# Patient Record
Sex: MALE | URBAN - METROPOLITAN AREA
[De-identification: names, ages, dates, MRNs, and addresses within clinical notes are randomized per-mention and may not be internally consistent; named-entity substitution may affect disease eponyms.]

---

## 2018-01-09 ENCOUNTER — APPOINTMENT (RX ONLY)
Dept: URBAN - METROPOLITAN AREA CLINIC 4 | Facility: CLINIC | Age: 68
Setting detail: DERMATOLOGY
End: 2018-01-09

## 2018-01-09 DIAGNOSIS — I87.2 VENOUS INSUFFICIENCY (CHRONIC) (PERIPHERAL): ICD-10-CM

## 2018-01-09 DIAGNOSIS — L81.4 OTHER MELANIN HYPERPIGMENTATION: ICD-10-CM

## 2018-01-09 DIAGNOSIS — L82.1 OTHER SEBORRHEIC KERATOSIS: ICD-10-CM

## 2018-01-09 DIAGNOSIS — L85.3 XEROSIS CUTIS: ICD-10-CM

## 2018-01-09 DIAGNOSIS — D22 MELANOCYTIC NEVI: ICD-10-CM

## 2018-01-09 PROBLEM — D22.5 MELANOCYTIC NEVI OF TRUNK: Status: ACTIVE | Noted: 2018-01-09

## 2018-01-09 PROBLEM — I83.10 VARICOSE VEINS OF UNSPECIFIED LOWER EXTREMITY WITH INFLAMMATION: Status: ACTIVE | Noted: 2018-01-09

## 2018-01-09 PROCEDURE — ? COUNSELING

## 2018-01-09 PROCEDURE — 99202 OFFICE O/P NEW SF 15 MIN: CPT

## 2018-01-09 ASSESSMENT — LOCATION ZONE DERM
LOCATION ZONE: TRUNK
LOCATION ZONE: FACE

## 2018-01-09 ASSESSMENT — LOCATION SIMPLE DESCRIPTION DERM
LOCATION SIMPLE: UPPER BACK
LOCATION SIMPLE: RIGHT UPPER BACK
LOCATION SIMPLE: LEFT CHEEK
LOCATION SIMPLE: LEFT UPPER BACK

## 2018-01-09 ASSESSMENT — LOCATION DETAILED DESCRIPTION DERM
LOCATION DETAILED: LEFT INFERIOR CENTRAL MALAR CHEEK
LOCATION DETAILED: LEFT INFERIOR MEDIAL UPPER BACK
LOCATION DETAILED: LEFT CENTRAL MALAR CHEEK
LOCATION DETAILED: INFERIOR THORACIC SPINE
LOCATION DETAILED: RIGHT INFERIOR MEDIAL UPPER BACK

## 2018-02-07 ENCOUNTER — OFFICE VISIT (OUTPATIENT)
Dept: MEDICAL GROUP | Facility: MEDICAL CENTER | Age: 68
End: 2018-02-07
Payer: COMMERCIAL

## 2018-02-07 VITALS
TEMPERATURE: 97.4 F | WEIGHT: 202 LBS | OXYGEN SATURATION: 95 % | HEART RATE: 68 BPM | DIASTOLIC BLOOD PRESSURE: 78 MMHG | RESPIRATION RATE: 16 BRPM | SYSTOLIC BLOOD PRESSURE: 110 MMHG | HEIGHT: 70 IN | BODY MASS INDEX: 28.92 KG/M2

## 2018-02-07 DIAGNOSIS — Z13.1 SCREENING FOR DIABETES MELLITUS: ICD-10-CM

## 2018-02-07 DIAGNOSIS — Z00.00 HEALTHCARE MAINTENANCE: ICD-10-CM

## 2018-02-07 DIAGNOSIS — E66.3 OVERWEIGHT (BMI 25.0-29.9): ICD-10-CM

## 2018-02-07 DIAGNOSIS — K22.719 BARRETT'S ESOPHAGUS WITH DYSPLASIA: ICD-10-CM

## 2018-02-07 DIAGNOSIS — Z23 NEED FOR VACCINATION: ICD-10-CM

## 2018-02-07 DIAGNOSIS — E78.5 DYSLIPIDEMIA: ICD-10-CM

## 2018-02-07 DIAGNOSIS — R10.13 DYSPEPSIA: ICD-10-CM

## 2018-02-07 DIAGNOSIS — K64.8 OTHER HEMORRHOIDS: ICD-10-CM

## 2018-02-07 DIAGNOSIS — Z13.6 SCREENING FOR ISCHEMIC HEART DISEASE: ICD-10-CM

## 2018-02-07 DIAGNOSIS — R68.82 DECREASED LIBIDO: ICD-10-CM

## 2018-02-07 DIAGNOSIS — R35.1 NOCTURIA: ICD-10-CM

## 2018-02-07 PROBLEM — K22.70 BARRETT ESOPHAGUS: Status: ACTIVE | Noted: 2018-02-07

## 2018-02-07 PROCEDURE — 90471 IMMUNIZATION ADMIN: CPT | Performed by: FAMILY MEDICINE

## 2018-02-07 PROCEDURE — 90670 PCV13 VACCINE IM: CPT | Performed by: FAMILY MEDICINE

## 2018-02-07 PROCEDURE — 99397 PER PM REEVAL EST PAT 65+ YR: CPT | Mod: 25 | Performed by: FAMILY MEDICINE

## 2018-02-07 RX ORDER — OMEPRAZOLE 20 MG/1
20 CAPSULE, DELAYED RELEASE ORAL DAILY
Qty: 90 CAP | Refills: 3 | Status: SHIPPED | OUTPATIENT
Start: 2018-02-07 | End: 2019-03-19 | Stop reason: SDUPTHER

## 2018-02-07 RX ORDER — UBIDECARENONE 200 MG
CAPSULE ORAL
COMMUNITY
End: 2019-04-29

## 2018-02-07 ASSESSMENT — PATIENT HEALTH QUESTIONNAIRE - PHQ9: CLINICAL INTERPRETATION OF PHQ2 SCORE: 0

## 2018-02-07 NOTE — ASSESSMENT & PLAN NOTE
In 2015 the patient had an EGD showing short segment Downey's esophagus. He is not currently on a proton pump inhibitor.  
Lipids: Ordered.  Fasting Glucose: Ordered.  Hepatitis C Screen: Ordered.  Colonoscopy: Records requested from the [facility].    Flu vaccine: given 2017 per patient.   Pneumonia vaccine: Prevnar given 02/07/18.   Tdap: done 5 years ago per patient.   Zoster vaccine: given at 63 y/o per patient.    
Patient has dyspepsia. He did use a proton pump inhibitor in the past with good effect.  
Patient states that he has to get up about 3 times per night to urinate. He denies dysuria.  
The patient describes decreased libido with softer erections than usual. He is interested in testosterone testing.  
The patient has hemorrhoids. He would like these examined today.  
The patient is only taking red yeast rice and omega-3 fatty acids for his cholesterol. He used to take a statin but stopped. He tolerated the statin just fine.  
The patient is overweight with dyslipidemia. He is interested in medical weight loss options.  
Attending Only

## 2018-02-07 NOTE — PROGRESS NOTES
[facility]  Progress Note  Established Patient    Subjective:   Heber Jacobs is a 68 y.o. male here today for a wellness exam.     Decreased libido  The patient describes decreased libido with softer erections than usual. He is interested in testosterone testing.    Healthcare maintenance  Lipids: Ordered.  Fasting Glucose: Ordered.  Hepatitis C Screen: Ordered.  Colonoscopy: Records requested from the [facility].    Flu vaccine: given 2017 per patient.   Pneumonia vaccine: Prevnar given 02/07/18.   Tdap: done 5 years ago per patient.   Zoster vaccine: given at 61 y/o per patient.      Overweight (BMI 25.0-29.9)  The patient is overweight with dyslipidemia. He is interested in medical weight loss options.    Other hemorrhoids  The patient has hemorrhoids. He would like these examined today.    Nocturia  Patient states that he has to get up about 3 times per night to urinate. He denies dysuria.    Dyspepsia  Patient has dyspepsia. He did use a proton pump inhibitor in the past with good effect.    Dyslipidemia  The patient is only taking red yeast rice and omega-3 fatty acids for his cholesterol. He used to take a statin but stopped. He tolerated the statin just fine.    Downey esophagus  In 2015 the patient had an EGD showing short segment Downey's esophagus. He is not currently on a proton pump inhibitor.      Current Outpatient Prescriptions on File Prior to Visit   Medication Sig Dispense Refill   • Ascorbic Acid Buffered (BUFFERED VITAMIN C) 1000 MG Cap Take 3,000 mg by mouth 2 Times a Day. 180 Cap 6   • therapeutic multivitamin-minerals (THERAGRAN-M) Tab Take 1 Tab by mouth every day. 30 Tab 11   • Omega-3 Fatty Acids (FISH OIL) 1200 MG Cap Take  by mouth.     • hyoscyamine (ANASPAZ, LEVSIN) 0.125 MG tablet Take 1 Tab by mouth every four hours as needed. 20 Tab 1     No current facility-administered medications on file prior to visit.        Past Medical History:   Diagnosis Date   •  "Downey esophagus    • GERD (gastroesophageal reflux disease)    • Hemorrhoid    • Hyperlipidemia    • Nocturia        Allergies: Patient has no known allergies.    Surgical History:  has a past surgical history that includes knee arthroscopy (9/ 2013) and exploratory laparotomy.    Family History: family history includes Cancer in his brother and mother; Heart Disease in his father.    Social History:  reports that he has never smoked. He has never used smokeless tobacco. He reports that he drinks alcohol. He reports that he does not use drugs.    ROS: see HPI.        Objective:     Vitals:    02/07/18 0936   BP: 110/78   Pulse: 68   Resp: 16   Temp: 36.3 °C (97.4 °F)   SpO2: 95%   Weight: 91.6 kg (202 lb)   Height: 1.778 m (5' 10\")       Physical Exam:  General: alert in no apparent distress.   Cardio: regular rate and rhythm, no murmurs, rubs or gallops.   Resp: CTAB no w/r/r.   : residual hemorrhoid identified. Homogenously enlarged prostate without nodularity.         Assessment and Plan:     1. Healthcare maintenance  - see HPI.   - discussed diet and exercise.     2. Decreased libido  - TESTOSTERONE SERUM; Future    3. Screening for ischemic heart disease  - Lipid panel off Red Yeast Rice.    4. Screening for diabetes mellitus  - COMP METABOLIC PANEL; Future    5. Other hemorrhoids  - supportive care.     6. Dyspepsia  - PPI.     7. Overweight (BMI 25.0-29.9)  - REFERRAL TO [facility] [employer] IMPROVEMENT PROGRAMS (HIP) Services Requested: Physician Medical Weight Management Program; Reason for Referral? BMI>25 and 1 or more co-morbidities, including DM2, HTN, steatosis/steatohepatitis/fatty liver, obstructive ...    8. Nocturia  With enlarged prostate on exam.   - URINALYSIS,CULTURE IF INDICATED; Future  - PROSTATE SPECIFIC AG SCREENING; Future  - consider Flomax in f/u.     9. Need for vaccination  - PNEUMOCOCCAL CONJUGATE VACCINE 13-VALENT    10. Dyslipidemia  - LIPID PROFILE; Future    11. Downey's esophagus " with dysplasia  - omeprazole (PRILOSEC) 20 MG delayed-release capsule; Take 1 Cap by mouth every day.  Dispense: 90 Cap; Refill: 3  - REFERRAL TO GASTROENTEROLOGY        Followup: Return in about 4 weeks (around 3/7/2018), or if symptoms worsen or fail to improve.

## 2018-02-21 ENCOUNTER — HOSPITAL ENCOUNTER (OUTPATIENT)
Dept: LAB | Facility: MEDICAL CENTER | Age: 68
End: 2018-02-21
Attending: FAMILY MEDICINE
Payer: COMMERCIAL

## 2018-02-21 DIAGNOSIS — R35.1 NOCTURIA: ICD-10-CM

## 2018-02-21 DIAGNOSIS — R68.82 DECREASED LIBIDO: ICD-10-CM

## 2018-02-21 DIAGNOSIS — Z13.1 SCREENING FOR DIABETES MELLITUS: ICD-10-CM

## 2018-02-21 DIAGNOSIS — E78.5 DYSLIPIDEMIA: ICD-10-CM

## 2018-02-21 LAB
ALBUMIN SERPL BCP-MCNC: 4.6 G/DL (ref 3.2–4.9)
ALBUMIN/GLOB SERPL: 1.8 G/DL
ALP SERPL-CCNC: 73 U/L (ref 30–99)
ALT SERPL-CCNC: 17 U/L (ref 2–50)
ANION GAP SERPL CALC-SCNC: 7 MMOL/L (ref 0–11.9)
APPEARANCE UR: CLEAR
AST SERPL-CCNC: 19 U/L (ref 12–45)
BILIRUB SERPL-MCNC: 0.6 MG/DL (ref 0.1–1.5)
BILIRUB UR QL STRIP.AUTO: NEGATIVE
BUN SERPL-MCNC: 15 MG/DL (ref 8–22)
CALCIUM SERPL-MCNC: 9.4 MG/DL (ref 8.5–10.5)
CHLORIDE SERPL-SCNC: 106 MMOL/L (ref 96–112)
CHOLEST SERPL-MCNC: 241 MG/DL (ref 100–199)
CO2 SERPL-SCNC: 29 MMOL/L (ref 20–33)
COLOR UR: YELLOW
CREAT SERPL-MCNC: 0.78 MG/DL (ref 0.5–1.4)
CULTURE IF INDICATED INDCX: NO UA CULTURE
GLOBULIN SER CALC-MCNC: 2.5 G/DL (ref 1.9–3.5)
GLUCOSE SERPL-MCNC: 88 MG/DL (ref 65–99)
GLUCOSE UR STRIP.AUTO-MCNC: NEGATIVE MG/DL
HDLC SERPL-MCNC: 47 MG/DL
KETONES UR STRIP.AUTO-MCNC: NEGATIVE MG/DL
LDLC SERPL CALC-MCNC: 169 MG/DL
LEUKOCYTE ESTERASE UR QL STRIP.AUTO: NEGATIVE
MICRO URNS: NORMAL
NITRITE UR QL STRIP.AUTO: NEGATIVE
PH UR STRIP.AUTO: 6.5 [PH]
POTASSIUM SERPL-SCNC: 3.8 MMOL/L (ref 3.6–5.5)
PROT SERPL-MCNC: 7.1 G/DL (ref 6–8.2)
PROT UR QL STRIP: NEGATIVE MG/DL
PSA SERPL-MCNC: 2.53 NG/ML (ref 0–4)
RBC UR QL AUTO: NEGATIVE
SODIUM SERPL-SCNC: 142 MMOL/L (ref 135–145)
SP GR UR STRIP.AUTO: 1.02
TESTOST SERPL-MCNC: 472 NG/DL (ref 175–781)
TRIGL SERPL-MCNC: 124 MG/DL (ref 0–149)
UROBILINOGEN UR STRIP.AUTO-MCNC: 0.2 MG/DL

## 2018-02-21 PROCEDURE — 80061 LIPID PANEL: CPT

## 2018-02-21 PROCEDURE — 36415 COLL VENOUS BLD VENIPUNCTURE: CPT

## 2018-02-21 PROCEDURE — 80053 COMPREHEN METABOLIC PANEL: CPT

## 2018-02-21 PROCEDURE — 81003 URINALYSIS AUTO W/O SCOPE: CPT

## 2018-02-21 PROCEDURE — 84153 ASSAY OF PSA TOTAL: CPT

## 2018-02-21 PROCEDURE — 84403 ASSAY OF TOTAL TESTOSTERONE: CPT

## 2018-03-07 ENCOUNTER — OFFICE VISIT (OUTPATIENT)
Dept: MEDICAL GROUP | Facility: MEDICAL CENTER | Age: 68
End: 2018-03-07
Payer: COMMERCIAL

## 2018-03-07 VITALS
RESPIRATION RATE: 16 BRPM | WEIGHT: 204 LBS | SYSTOLIC BLOOD PRESSURE: 118 MMHG | HEIGHT: 70 IN | TEMPERATURE: 97.1 F | DIASTOLIC BLOOD PRESSURE: 64 MMHG | OXYGEN SATURATION: 94 % | BODY MASS INDEX: 29.2 KG/M2 | HEART RATE: 72 BPM

## 2018-03-07 DIAGNOSIS — R35.1 BENIGN PROSTATIC HYPERPLASIA WITH NOCTURIA: ICD-10-CM

## 2018-03-07 DIAGNOSIS — Z00.00 HEALTHCARE MAINTENANCE: ICD-10-CM

## 2018-03-07 DIAGNOSIS — N40.1 BENIGN PROSTATIC HYPERPLASIA WITH NOCTURIA: ICD-10-CM

## 2018-03-07 DIAGNOSIS — N52.9 ERECTILE DYSFUNCTION, UNSPECIFIED ERECTILE DYSFUNCTION TYPE: ICD-10-CM

## 2018-03-07 DIAGNOSIS — E78.5 DYSLIPIDEMIA: ICD-10-CM

## 2018-03-07 DIAGNOSIS — M25.552 PAIN OF LEFT HIP JOINT: ICD-10-CM

## 2018-03-07 PROCEDURE — 99214 OFFICE O/P EST MOD 30 MIN: CPT | Performed by: FAMILY MEDICINE

## 2018-03-07 RX ORDER — FINASTERIDE 5 MG/1
5 TABLET, FILM COATED ORAL DAILY
Qty: 90 TAB | Refills: 3 | Status: SHIPPED | OUTPATIENT
Start: 2018-03-07 | End: 2018-12-31

## 2018-03-07 RX ORDER — SILDENAFIL 50 MG/1
50 TABLET, FILM COATED ORAL PRN
Qty: 10 TAB | Refills: 1 | Status: SHIPPED | OUTPATIENT
Start: 2018-03-07 | End: 2018-07-16 | Stop reason: SDUPTHER

## 2018-03-07 RX ORDER — ASPIRIN 81 MG/1
81 TABLET, CHEWABLE ORAL DAILY
Qty: 100 TAB | Refills: 0
Start: 2018-03-07 | End: 2021-07-15

## 2018-03-07 RX ORDER — SIMVASTATIN 20 MG
20 TABLET ORAL EVERY EVENING
Qty: 90 TAB | Refills: 3 | Status: SHIPPED | OUTPATIENT
Start: 2018-03-07 | End: 2019-03-19 | Stop reason: SDUPTHER

## 2018-03-07 NOTE — ASSESSMENT & PLAN NOTE
At the last visit the patient described decreased libido with softer erections. Testosterone normal. He is interested in Viagra, however, does not want to take it daily.

## 2018-03-07 NOTE — ASSESSMENT & PLAN NOTE
The patient describes a many year history of intermittent left hip pain rated 2-3 out of 10 when it does occur. It has not worsened over these many years. There are no known alleviating or exacerbating factors.

## 2018-03-07 NOTE — ASSESSMENT & PLAN NOTE
She has dyslipidemia. His ten year ACC risk is 14%. He has used simvastatin effectively in the past.

## 2018-03-07 NOTE — ASSESSMENT & PLAN NOTE
Lipids: Done in 2018, 10 year ACC risk 14%. Starting statin and aspirin.  Fasting Glucose: 2018 was normal.  Hepatitis C Screen: 2018 was normal.  Colonoscopy: Records requested from the [facility].    Flu vaccine: given 2017 per patient.   Pneumonia vaccine: Prevnar given 02/07/18.   Tdap: done 5 years ago per patient.   Zoster vaccine: given at 61 y/o per patient.

## 2018-03-07 NOTE — ASSESSMENT & PLAN NOTE
The patient has an enlarged prostate with nocturia. UA and PSA normal. He cannot tolerate alpha blockers.

## 2018-03-07 NOTE — PROGRESS NOTES
[facility]  Progress Note  Established Patient    Subjective:   Heber Jacobs is a 68 y.o. male here today with a chief complaint of hip pain. The patient is alone.     Pain of left hip joint  The patient describes a many year history of intermittent left hip pain rated 2-3 out of 10 when it does occur. It has not worsened over these many years. There are no known alleviating or exacerbating factors.    Benign prostatic hyperplasia with nocturia  The patient has an enlarged prostate with nocturia. UA and PSA normal. He cannot tolerate alpha blockers.     Dyslipidemia  She has dyslipidemia. His ten year ACC risk is 14%. He has used simvastatin effectively in the past.     Erectile dysfunction  At the last visit the patient described decreased libido with softer erections. Testosterone normal. He is interested in Viagra, however, does not want to take it daily.    Healthcare maintenance  Lipids: Done in 2018, 10 year ACC risk 14%. Starting statin and aspirin.  Fasting Glucose: 2018 was normal.  Hepatitis C Screen: 2018 was normal.  Colonoscopy: Records requested from the [facility].    Flu vaccine: given 2017 per patient.   Pneumonia vaccine: Prevnar given 02/07/18.   Tdap: done 5 years ago per patient.   Zoster vaccine: given at 63 y/o per patient.      Current Outpatient Prescriptions on File Prior to Visit   Medication Sig Dispense Refill   • Coenzyme Q10 (COQ-10) 200 MG Cap Take  by mouth.     • omeprazole (PRILOSEC) 20 MG delayed-release capsule Take 1 Cap by mouth every day. 90 Cap 3   • hyoscyamine (ANASPAZ, LEVSIN) 0.125 MG tablet Take 1 Tab by mouth every four hours as needed. 20 Tab 1   • Ascorbic Acid Buffered (BUFFERED VITAMIN C) 1000 MG Cap Take 3,000 mg by mouth 2 Times a Day. 180 Cap 6   • therapeutic multivitamin-minerals (THERAGRAN-M) Tab Take 1 Tab by mouth every day. 30 Tab 11   • Omega-3 Fatty Acids (FISH OIL) 1200 MG Cap Take  by mouth.       No current facility-administered  "medications on file prior to visit.        Past Medical History:   Diagnosis Date   • Downey esophagus    • GERD (gastroesophageal reflux disease)    • Hemorrhoid    • Hyperlipidemia    • Nocturia        Allergies: Patient has no known allergies.    Surgical History:  has a past surgical history that includes knee arthroscopy (9/ 2013) and exploratory laparotomy.    Family History: family history includes Cancer in his brother and mother; Heart Disease in his father.    Social History:  reports that he has never smoked. He has never used smokeless tobacco. He reports that he drinks alcohol. He reports that he does not use drugs.    ROS: no fever, no bowel or bladder incontinence or retention.        Objective:     Vitals:    03/07/18 0753   BP: 118/64   Pulse: 72   Resp: 16   Temp: 36.2 °C (97.1 °F)   SpO2: 94%   Weight: 92.5 kg (204 lb)   Height: 1.778 m (5' 10\")       Physical Exam:  General: alert in no apparent distress.   MSK: No tenderness to palpation over spine or hip. No trochanteric bursal tenderness.RODERICK negative bilaterally. Negative logroll bilaterally. Patient points to the left SI joint when asked where his pain is.        Assessment and Plan:     1. Erectile dysfunction, unspecified erectile dysfunction type  - sildenafil citrate (VIAGRA) 50 MG tablet; Take 1 Tab by mouth as needed for Erectile Dysfunction.  Dispense: 10 Tab; Refill: 1    2. Dyslipidemia  - simvastatin (ZOCOR) 20 MG Tab; Take 1 Tab by mouth every evening.  Dispense: 90 Tab; Refill: 3    3. Benign prostatic hyperplasia with nocturia  Cannot tolerate alpha blockers, doesn't want to take daily viagra.   - finasteride (PROSCAR) 5 MG Tab; Take 1 Tab by mouth every day.  Dispense: 90 Tab; Refill: 3    4. Healthcare maintenance  - see HPI.   - aspirin (ASA) 81 MG Chew Tab chewable tablet; Take 1 Tab by mouth every day.  Dispense: 100 Tab; Refill: 0  - simvastatin (ZOCOR) 20 MG Tab; Take 1 Tab by mouth every evening.  Dispense: 90 Tab; " Refill: 3    5. Pain of left hip joint  Exam normal. Suspect L SI joint pain.   - DX-LUMBAR SPINE-2 OR 3 VIEWS; Future  - DX-HIP-BILATERAL-WITH PELVIS-2 VIEWS; Future  - discussed exercises and stretches, booklet given.   - advised judicious use of NSAID's.  - return if no resolution by 6 weeks.         Followup: Return in about 9 months (around 12/7/2018), or if symptoms worsen or fail to improve.

## 2018-04-03 ENCOUNTER — NON-PROVIDER VISIT (OUTPATIENT)
Dept: HEALTH INFORMATION MANAGEMENT | Facility: MEDICAL CENTER | Age: 68
End: 2018-04-03
Payer: COMMERCIAL

## 2018-04-03 ENCOUNTER — HOSPITAL ENCOUNTER (OUTPATIENT)
Dept: RADIOLOGY | Facility: MEDICAL CENTER | Age: 68
End: 2018-04-03
Attending: FAMILY MEDICINE
Payer: COMMERCIAL

## 2018-04-03 VITALS
HEART RATE: 75 BPM | SYSTOLIC BLOOD PRESSURE: 122 MMHG | WEIGHT: 203.6 LBS | BODY MASS INDEX: 29.15 KG/M2 | DIASTOLIC BLOOD PRESSURE: 76 MMHG | OXYGEN SATURATION: 96 % | HEIGHT: 70 IN

## 2018-04-03 DIAGNOSIS — M25.552 PAIN OF LEFT HIP JOINT: ICD-10-CM

## 2018-04-03 DIAGNOSIS — E66.3 OVERWEIGHT (BMI 25.0-29.9): ICD-10-CM

## 2018-04-03 DIAGNOSIS — E78.5 DYSLIPIDEMIA: ICD-10-CM

## 2018-04-03 PROCEDURE — 73521 X-RAY EXAM HIPS BI 2 VIEWS: CPT

## 2018-04-03 PROCEDURE — 97802 MEDICAL NUTRITION INDIV IN: CPT | Performed by: DIETITIAN, REGISTERED

## 2018-04-03 PROCEDURE — 72100 X-RAY EXAM L-S SPINE 2/3 VWS: CPT

## 2018-04-03 PROCEDURE — 93000 ELECTROCARDIOGRAM COMPLETE: CPT | Performed by: INTERNAL MEDICINE

## 2018-04-03 PROCEDURE — 99204 OFFICE O/P NEW MOD 45 MIN: CPT | Mod: 25 | Performed by: INTERNAL MEDICINE

## 2018-04-03 NOTE — PATIENT INSTRUCTIONS
Keep total daily kcal <1600 to start, total daily carbs < 100 g per day and >100 g protein per day  64+ oz water per day  One glass milk per day  Track intake with My Fitness Pal  One Robard shake mid-morning daily while working  Consider instead of nuts at night using Robard snack bar for some nights  If having nuts, measure portion sizes first

## 2018-04-03 NOTE — PROGRESS NOTES
"4/3/2018   Referring Provider: Levi Akers M.D.       Time in/out:  11:20-    Anthropometrics/Objective  Weight: 203.6lb   Height: 5'10\"  BMI:  29.26  Stated Goal Weight: 175-180lb  See comprehensive patient history form for further information     Subjective:  Pt is here today for the initial screening visit for the medical weight management program.   He is a  so often eats a light breakfast, then goes out to breakfast during his break for a large meal. Then skips lunch and doesn't eat until dinner around 5pm.   He finds that he grazes at night while on the computer - 1/2 cup nuts for examples   Has done [employer] in past but was too difficult to maintain   See Medical Questionnaire for more detailed diet history     Nutrition Diagnosis (PES Statement)  · Obesity related to excessive energy intake and inadequate energy expenditure as evidenced by BMI >30     Client history:  Condition(s) associated with a diagnosis or treatment (specify) : Dyslipidemia, Downey esophagus     Biochemical data, medical test and procedures  No results found for: HBA1C@  No results found for: POCGLUCOSE  Lab Results   Component Value Date/Time    CHOLSTRLTOT 241 (H) 02/21/2018 08:14 AM     (H) 02/21/2018 08:14 AM    HDL 47 02/21/2018 08:14 AM    TRIGLYCERIDE 124 02/21/2018 08:14 AM         Nutrition Intervention  Nutrition Prescription  Recommended Daily Kcals: ~1650 Kcal based on REE of 1671kcal    Recommended Daily Protein: 80-120g (20-30% kcal)     Meal Plan Recommendation   Low Calorie Diet with 1 snack replacement bar per day     Comprehensive Nutrition Education Instruction or training leading to in-depth nutrition related knowledge about:  Benefits to following meal plan, Combine carb, protein and fat at each meal, Meal timing and spacing, Portion control, Sweets and alcohol in moderation   Handouts provided regarding topics discussed     Monitoring & Evaluation " Plan    Behavioral-Environmental:  Behavior: Keep a food journal and bring to next appointment _ My Fitness pal aiming for ~1600kcal per day    Physical activity:  Exercise 2 days a week and increase as able     Food / Nutrient Intake:  Food intake: Follow meal plan as discussed (see above). Avoid concentrated sweets and processed carbs.  Use plate method for macronutrient balance; limit starch portion to 1/2 cup per meal or less. Briefly discussed a goal of <100g carbs a day but nothing concrete.     Do not skip lunch. Have a Robard snack bar for 10am snack instead of going out to breakfast.  Avoid grazing at night. Instead have a high protein (1oz) snack with veggies or 1 fruit. As long as you are eating a meal within 1 hr of working out , there is not need for a protein drink or chocolate milk.     Fluid intake: Consume at least 64 oz water per day. Avoid all unsweetened beverages (chocolate milk). Limit coffee to 1 cup a day (ideally no cream or sugar). Avoid alcohol.     Physical Signs / Symptoms:  Weight change : -1-2lb per week or 5% in 3 months ; Pt would like to lose 20lb and does not need to lose it rapidly.       Assessment Notes:  Pt will start by tracking in My fitness pal. He will work on a better schedule for meal timing. His biggest area for improvement is to avoid going out for a large breakfast and then avoiding excess calories from grazing at night, mindless eating. Keeping the food journal should help with mindful eating. Recommended pt also work on macronutrient balance, aiming for less processed carbs and higher protein meals. Did not go over specific number or carbs to avoid list. Wanted first for pt to see his current portions and discuss adjustments at next appt as needed.     Follow up 2 weeks

## 2018-04-04 DIAGNOSIS — T78.40XA ALLERGIC DISORDER, INITIAL ENCOUNTER: ICD-10-CM

## 2018-04-17 ENCOUNTER — OFFICE VISIT (OUTPATIENT)
Dept: MEDICAL GROUP | Facility: MEDICAL CENTER | Age: 68
End: 2018-04-17
Payer: COMMERCIAL

## 2018-04-17 VITALS
SYSTOLIC BLOOD PRESSURE: 128 MMHG | OXYGEN SATURATION: 94 % | TEMPERATURE: 97.4 F | WEIGHT: 198.2 LBS | HEART RATE: 68 BPM | DIASTOLIC BLOOD PRESSURE: 70 MMHG | BODY MASS INDEX: 28.37 KG/M2 | HEIGHT: 70 IN

## 2018-04-17 DIAGNOSIS — K64.5 THROMBOSED HEMORRHOIDS: ICD-10-CM

## 2018-04-17 PROCEDURE — 99214 OFFICE O/P EST MOD 30 MIN: CPT | Performed by: PHYSICIAN ASSISTANT

## 2018-04-17 RX ORDER — HYDROCORTISONE ACETATE 25 MG/1
25 SUPPOSITORY RECTAL EVERY 12 HOURS
Qty: 20 SUPPOSITORY | Refills: 0 | Status: SHIPPED | OUTPATIENT
Start: 2018-04-17 | End: 2019-04-29

## 2018-04-17 NOTE — PROGRESS NOTES
"Chief Complaint   Patient presents with   • Hemorrhoids     outside of rectum,very pain x 3 days       HPI  Heber Jacobs is a 68 y.o. male here today for new onset rectal pain X 3-4 days. Pt has history of internal and external hemorrhoids. He noticed new onset pain and swelling 3 days ago and pain has been increasing every day. Pain in is constant 3/10. No constipation or D,N,V, no blood in stool, no rectal bleeding.     Past medical, surgical, family, and social history is reviewed in Epic chart by me today.   Medications and allergies reviewed and updated in Epic chart by me today.     ROS:   As documented in history of present illness above    Exam:  Blood pressure 128/70, pulse 68, temperature 36.3 °C (97.4 °F), height 1.778 m (5' 10\"), weight 89.9 kg (198 lb 3.2 oz), SpO2 94 %.  Constitutional: Alert, no distress, plus 3 vital signs  Skin:  Warm, dry, no rashes invisible areas  Eye: Equal, round and reactive, conjunctiva clear  Abdomen: Pos thrombosed external hemorrhoid at 7:00 position. Mild tenderness to touch   Psych: Alert, pleasant, well-groomed, normal affect    A/P:  1. Thrombosed hemorrhoids  Advised increased fiber, fluid intake, stool softener, locking, sitz bath, topical hydrocortisone cream  - REFERRAL TO GENERAL SURGERY if symptoms doesn't improve in 10 days  - hydrocortisone (ANUSOL-HC) 25 MG Suppos; Insert 1 Suppository in rectum every 12 hours.  Dispense: 20 Suppository; Refill: 0    F/u prn  "

## 2018-04-18 ENCOUNTER — OFFICE VISIT (OUTPATIENT)
Dept: HEALTH INFORMATION MANAGEMENT | Facility: MEDICAL CENTER | Age: 68
End: 2018-04-18
Payer: COMMERCIAL

## 2018-04-18 ENCOUNTER — TELEPHONE (OUTPATIENT)
Dept: MEDICAL GROUP | Facility: MEDICAL CENTER | Age: 68
End: 2018-04-18

## 2018-04-18 VITALS
BODY MASS INDEX: 28.1 KG/M2 | WEIGHT: 196.3 LBS | SYSTOLIC BLOOD PRESSURE: 120 MMHG | HEIGHT: 70 IN | HEART RATE: 67 BPM | OXYGEN SATURATION: 98 % | DIASTOLIC BLOOD PRESSURE: 68 MMHG

## 2018-04-18 VITALS — BODY MASS INDEX: 28.1 KG/M2 | WEIGHT: 196.3 LBS | HEIGHT: 70 IN

## 2018-04-18 DIAGNOSIS — E78.5 DYSLIPIDEMIA: ICD-10-CM

## 2018-04-18 DIAGNOSIS — E66.3 OVERWEIGHT (BMI 25.0-29.9): ICD-10-CM

## 2018-04-18 DIAGNOSIS — E88.810 METABOLIC SYNDROME: ICD-10-CM

## 2018-04-18 DIAGNOSIS — K64.5 THROMBOSED HEMORRHOIDS: ICD-10-CM

## 2018-04-18 PROCEDURE — 97803 MED NUTRITION INDIV SUBSEQ: CPT | Performed by: DIETITIAN, REGISTERED

## 2018-04-18 PROCEDURE — 99213 OFFICE O/P EST LOW 20 MIN: CPT | Performed by: INTERNAL MEDICINE

## 2018-04-18 NOTE — TELEPHONE ENCOUNTER
Pharmacist left a mess indcating that the following rx could be prescribed.  Hydrocotizone 2.5% crm, Sig: Apply to skin around rectal area externally 2-4 times daily as needed.  Elizabeth, please rx if ok.

## 2018-04-18 NOTE — TELEPHONE ENCOUNTER
I spoke with Марина @ [facility] pharmacy and she is going to call our office back.  She needs to figure out what they have in stock.

## 2018-04-18 NOTE — TELEPHONE ENCOUNTER
Received a pharmacy fax stating Anucort is not covered and will cost over $400.  Pharmacy states hydrocortisone 2.5mg rectal cream is covered if you would like to prescribe alternative they would need new rx.

## 2018-04-18 NOTE — PROGRESS NOTES
"Nutrition Reassess: Medical Weight Management   Today's date: 4/18/2018  Referring Provider: Levi Akers M.D.      Time: in/out 10:20-10:38    Subjective:  - walking for a half hour during the time he usually goes out to eat in the morning  - not using the bars much, but making better food choices  - feels he gets full faster than he did in the past   - has been trying to slow down when he eats    Diet history:   Breakfast - sauteed vegetable mix, plain yogurt, 1/4 C cheese, 2 slices [place] escobedo  Snack - MR bar or other off brand bar  Lunch - lemon pepper fish (usually a small meal) or mini puentes salad with olive oil/vinegar  Dinner - protein + vegetables    Anthropometrics/Objective  Today's weight:    Vitals:    04/18/18 1007   Weight: 89 kg (196 lb 4.8 oz)   Height: 1.778 m (5' 10\")     BMI:  Body mass index is 28.17 kg/m².  Starting weight/date 203.6lb      Total weight change : - 7.3lbs            Meal Plan:   Low Calorie Diet with 1 snack replacement bar per day    ReAssesment/Notes:  Heber has been tracking his intake daily with My Fitness Pal and feels this has been a great learning tool for him. He is averaging 1300 calories a day and is going to work on increasing to 1600. He is learning mindfulness, setting his fork down between bites and realizing that it takes a lot less than he has thought in the past to fill him up. I gave him the [employer] mindfulness handout to read. He is eating more regularly and making better choices when eating out. He is filling his down time with walking and when he does eat at the casino has a small side salad with healthier dressing on top. He is interested in tracking less in the future, but will continue for now. On his next visit he would like to discuss adding steel cut oats back into his breakfast and what an appropriate portion would look like otherwise he is going to continue with his current plan for now.      Follow-up: 1 month     "

## 2018-04-18 NOTE — PROGRESS NOTES
Bariatric Medicine Follow Up  Chief Complaint   Patient presents with   • Weight Gain       History of Present Illness:   Heber Jacobs is a 68 y.o. male who presents for weight management follow-up and to help address co-morbidities related to overweight, including dyslipidemia.    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  Behavior change: Mindful eating, better planning, stimulus narrowing with meal replacement  Follow-up: 2 weeks    Heber is doing well. He has begun to track all of his intake. He is trying to eat a lot less sugar, watch his carbohydrate intake. He uses a Robard snack bar midmorning, has tried other bars but cannot find one lower in carbohydrates. Tried Fit Crunch with 27 g of carbs. Has not been using meal replacement shakes. Had several drinks at a social outing in the last 2 weeks, is not usually drinking alcohol. 1500 kcal per day intake.    Has a thrombosed hemorrhoid, needs to get surgery in the next week. Denies constipation. Has trouble sitting, thinks his hemorrhoid developed from sitting so much as a .    Continues on statin for hypercholesterolemia.    Exercise:   Half-hour walk between bus shifts  Goes to the gym 2 days per week     Review of Systems   Denies constipation, lightheadedness, worsening fatigue. Energy level good.  All other ROS were reviewed and are otherwise unchanged from my previous visit  "with patient.    Physical Exam:    /68   Pulse 67   Ht 1.778 m (5' 10\")   Wt 89 kg (196 lb 4.8 oz)   SpO2 98%   BMI 28.17 kg/m²   Waist: 40.5 in  Body fat % 28.8  REE 1630 kcal/day    Weight change since last visit: -7 lb  Waist Circum change since last visit: -2 in    Constitutional: Oriented to person, place, and time and well-developed, well-nourished, and in no distress.    Head: Normocephalic.   Musculoskeletal: Normal range of motion. No edema.   Neurological: Alert and oriented to person, place, and time. No muscle weakness.  Gait normal.   Skin: Warm and dry. Not diaphoretic.   Psychiatric: Mood, memory, affect and judgment normal.     Laboratory:   None new.      Dietitian Assessment: I have reviewed the Dietitian's assessment related to this encounter.       ASSESSMENT/PLAN:  Body mass index is 28.17 kg/m².    Obesity Stage (Grace):  1; Class overweight    1. Overweight (BMI 25.0-29.9)     2. Dyslipidemia     3. Metabolic syndrome       The patient has begun to make very positive changes. He is tracking his intake, watching carbohydrates and sugars much more. He has increased his exercise, eating less restaurant food. Will continue to monitor lipids as he progresses through the program, remains on statin.    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  Behavior change: Mindful eating, tracking  Follow-up: One month

## 2018-05-17 ENCOUNTER — OFFICE VISIT (OUTPATIENT)
Dept: HEALTH INFORMATION MANAGEMENT | Facility: MEDICAL CENTER | Age: 68
End: 2018-05-17
Payer: COMMERCIAL

## 2018-05-17 VITALS
BODY MASS INDEX: 27.5 KG/M2 | WEIGHT: 192.1 LBS | SYSTOLIC BLOOD PRESSURE: 112 MMHG | DIASTOLIC BLOOD PRESSURE: 70 MMHG | HEART RATE: 66 BPM | HEIGHT: 70 IN | OXYGEN SATURATION: 98 %

## 2018-05-17 DIAGNOSIS — E66.3 OVERWEIGHT (BMI 25.0-29.9): ICD-10-CM

## 2018-05-17 DIAGNOSIS — E78.5 DYSLIPIDEMIA: ICD-10-CM

## 2018-05-17 DIAGNOSIS — E88.810 METABOLIC SYNDROME: ICD-10-CM

## 2018-05-17 PROCEDURE — 99213 OFFICE O/P EST LOW 20 MIN: CPT | Performed by: INTERNAL MEDICINE

## 2018-05-17 PROCEDURE — 97803 MED NUTRITION INDIV SUBSEQ: CPT | Performed by: DIETITIAN, REGISTERED

## 2018-05-17 NOTE — PROGRESS NOTES
Nutrition Reassess: Medical Weight Management   Today's date: 5/17/2018  Referring Provider: Levi Akers M.D.      Time: in/out 9:41-9:58am    Subjective:  - going into a 3 week stressful work stretch with no days off  - has been having more carbohydrate foods for breakfast, less vegetables/protein  - will be less able to go to the gym coming up  - still watching his portion size     Diet history:   Breakfast - 1/2 C steel cut oats w/ butter (50kcal) + sprinkle brwn sugar, or grape nuts or apple yogurt    Lunch - 2nd breakfast, eats out but avoiding the additional carbohydrates  Snack - MR bar  Dinner - protein + veggies     Anthropometrics/Objective  Today's weight: 192.1 #    BMI: 27.56  Starting weight/date 203.6# (4/3/18)   Total weight change: - 11.5#            Meal Plan:   Low Calorie Diet with 1 snack replacement bar per day     ReAssesment/Notes:    With a busy couple of week coming up Heber will not be able to get to the gym for his normal routine. He has agreed to download an nico similar to Step Z on his Android phone to start using to help increase his mindfulness of his daily activity. I think this will give him something to stay accountable with and move more despite being busy.     He has recently stopped using My Fitness Pal to track as he feels he gained enough knowledge to be able to know what he is consuming, but is going to restart tracking 3 days a week as he did feel it was helpful. Recommend he decrease the frequency of his high carbohydrate breakfast, he has agreed and will start back with the vegetables and higher protein options. He also plans to pre portion out his nuts as this tends to be a food he cannot control and if this does not work, he will not buy anymore. Review My Fitness Pal on his next visit and see how he has been able to cope and adjust his plan with an increase in his work hours.     Follow-up: 5 weeks

## 2018-05-17 NOTE — PROGRESS NOTES
Bariatric Medicine Follow Up  Chief Complaint   Patient presents with   • Weight Gain       History of Present Illness:   Heber Jacobs is a 68 y.o. male who presents for weight management follow-up and to help address co-morbidities related to overweight, including metabolic syndrome.    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  Behavior change: Mindful eating, tracking  Follow-up: One month    The patient has struggled in the last 2 weeks with not wanting to eat the same breakfast, eating too many snacks after dinner at night.  He had been eating vegetables most mornings, got tired of it so started eating oatmeal or pancakes for breakfast.  He also notes later in the day after dinner, he eats nuts but has trouble stopping with one handful.  Gets hungry at night still after 5 PM after work.    Tracking intake with MyFitnessPal but less often.  He feels he has learned about portion sizes, how many calories foods have and how to watch his carbohydrate intake.  He is choosing far fewer carbohydrates when he eats in a restaurant.    Continues on simvastatin for hypercholesterolemia.  GERD controlled on omeprazole    Exercise:   Walking half hour between morning shift, gym 2 days per week     Review of Systems   Denies lightheadedness,  "fatigue.  All other ROS were reviewed and are otherwise unchanged from my previous visit with patient.    Physical Exam:    /70   Pulse 66   Ht 1.778 m (5' 10\")   Wt 87.1 kg (192 lb 1.6 oz)   SpO2 98%   BMI 27.56 kg/m²   Waist: 40.5 in  Body fat % 27.4  REE 1607 kcal/day    Weight change since last visit: -4 lb (-11 lb total)  Waist Circum change since last visit:  0 thank you in (-2 in total)    Constitutional: Oriented to person, place, and time and well-developed, well-nourished, and in no distress.    Head: Normocephalic.    Musculoskeletal: Normal range of motion. No edema.   Neurological: Alert and oriented to person, place, and time. No muscle weakness.  Gait normal.   Skin: Warm and dry. Not diaphoretic.   Psychiatric: Mood, memory, affect and judgment normal.     Laboratory:   None new.      Dietitian Assessment: I have reviewed the Dietitian's assessment related to this encounter.       ASSESSMENT/PLAN:  Body mass index is 27.56 kg/m².    Obesity Stage ([place]): 2; Class overweight    1. Overweight (BMI 25.0-29.9)     2. Dyslipidemia     3. Metabolic syndrome       Weight loss slowing with reintroduction of more carbohydrates and increased total daily calorie intake.  Suggestions for portion control, reducing carbohydrates again, protein throughout the day monitor lipids, metabolic syndrome as he progresses through the program.    The patient and I have discussed at length and agree to the following recommendations, which are all addressing the above diagnoses:    Weight Goal: 3-5% wt loss each month (pt goal weight is 170 lb)  Diet: Continue low-carb/high-protein as previously discussed  Measure out snack sizes to limit portion size  1 Robard peanut butter meal replacement each morning, instead of a restaurant  Physical Activity: Walking half hour between morning shift, resume gym 2 days per week  Risk level for moderate/vigorous exercise program: Low  New Rx: None  Side Effects: Will review " consent if applicable.  Behavior change: Continue tracking, planning  Follow-up: One month    Patient's body mass index is 27.56 kg/m². Exercise and nutrition counseling were performed at this visit.

## 2018-05-23 ENCOUNTER — OFFICE VISIT (OUTPATIENT)
Dept: MEDICAL GROUP | Facility: MEDICAL CENTER | Age: 68
End: 2018-05-23
Payer: COMMERCIAL

## 2018-05-23 VITALS
RESPIRATION RATE: 16 BRPM | DIASTOLIC BLOOD PRESSURE: 74 MMHG | HEART RATE: 65 BPM | HEIGHT: 70 IN | BODY MASS INDEX: 27.8 KG/M2 | TEMPERATURE: 98.3 F | WEIGHT: 194.2 LBS | OXYGEN SATURATION: 95 % | SYSTOLIC BLOOD PRESSURE: 116 MMHG

## 2018-05-23 DIAGNOSIS — J06.9 UPPER RESPIRATORY TRACT INFECTION, UNSPECIFIED TYPE: ICD-10-CM

## 2018-05-23 PROCEDURE — 99213 OFFICE O/P EST LOW 20 MIN: CPT | Performed by: PHYSICIAN ASSISTANT

## 2018-05-23 NOTE — LETTER
May 23, 2018        Re: Heber Jacobs          Patient seen in office today with acute upper respiratory tract infection. Please excuse Tuesday, Wednesday and Thursday this week as he recovers. He is allowed to return on Friday.        Thank you for your time,            Jolie Diggs PA-C

## 2018-05-23 NOTE — PROGRESS NOTES
Subjective:      Heber Jacobs is a 68 y.o. male who presents with URI (x since saturday, worse since tuesday, sleep issues, congestion, taking OTC medication) and Other (notes for work tuesday, wednesday)          Chief Complaint   Patient presents with   • URI     x since saturday, worse since tuesday, sleep issues, congestion, taking OTC medication   • Other     notes for work tuesday, wednesday       HPISymptoms started Saturday - sore throat. Monday night didn't sleep well. Tuesday night had a lot of congestion and some cough. Took nyquil and nose drops (decongestant) which didn't really seem to help. No fever/chills. Works as . Had to miss work Tuesday and Wednesday. Did take Dayquil on Monday and Tuesday which helped some. No recent travel. Was exposed to a sick little girl on his bus on Friday.    ROSno fever/chills. No headache/dizziness. No sinus pain or pressure. No neck pain or back pain. No chest pain, SOB or difficulty breathing. No n/v/d/c or abdominal pain. No rash or skin lesion. No joint swelling or redness.    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 100 Tab 0   • simvastatin (ZOCOR) 20 MG Tab Take 1 Tab by mouth every evening. 90 Tab 3   • finasteride (PROSCAR) 5 MG Tab Take 1 Tab by mouth every day. 90 Tab 3   • Coenzyme Q10 (COQ-10) 200 MG Cap Take  by mouth.     • omeprazole (PRILOSEC) 20 MG delayed-release capsule Take 1 Cap by mouth every day. 90 Cap 3   • Ascorbic Acid Buffered (BUFFERED VITAMIN C) 1000 MG Cap Take 3,000 mg by mouth 2 Times a Day. 180 Cap 6   • therapeutic multivitamin-minerals (THERAGRAN-M) Tab Take 1 Tab by mouth every day. 30 Tab 11   • Omega-3 Fatty Acids (FISH OIL) 1200 MG Cap Take  by mouth.     • hydrocortisone rectal (PROCTOZONE HC) 2.5 % Cream Apply to skin around rectal area externally 2-4 times daily as needed. 1 Tube 2   • hydrocortisone (ANUSOL-HC) 25 MG Suppos Insert 1 Suppository in rectum every 12 hours. 20 Suppository 0   • sildenafil citrate (VIAGRA) 50 MG tablet Take 1 Tab by mouth as needed for Erectile Dysfunction. 10 Tab 1   • hyoscyamine (ANASPAZ, LEVSIN) 0.125 MG tablet Take 1 Tab by mouth every four hours as needed. 20 Tab 1     No current facility-administered medications for this visit.    nkda  Non-smoker   Objective:     /74   Pulse 65   Temp 36.8 °C (98.3 °F)   Resp 16   Ht 1.778 m (5' 10\")   Wt 88.1 kg (194 lb 3.2 oz)   SpO2 95%   BMI 27.86 kg/m²      Physical Exam   Constitutional: He is oriented to person, place, and time. He appears well-developed and well-nourished. No distress.   HENT:   Head: Normocephalic and atraumatic.   Right Ear: Hearing, tympanic membrane, external ear and ear canal normal.   Left Ear: Hearing, tympanic membrane, external ear and ear canal normal.   Nose: Rhinorrhea present. Right sinus exhibits no maxillary sinus tenderness and no frontal sinus tenderness. Left sinus exhibits no maxillary sinus tenderness and no frontal sinus tenderness.   Mouth/Throat: Uvula is midline, oropharynx is clear " and moist and mucous membranes are normal.   Eyes: Conjunctivae are normal.   Neck: Normal range of motion. Neck supple.   Cardiovascular: Normal rate, regular rhythm and normal heart sounds.    Pulmonary/Chest: Effort normal and breath sounds normal.   Lymphadenopathy:     He has no cervical adenopathy.   Neurological: He is alert and oriented to person, place, and time.   Skin: Skin is warm and dry. No rash noted. He is not diaphoretic. No pallor.   Psychiatric: He has a normal mood and affect. His behavior is normal. Judgment and thought content normal.   Vitals reviewed.              Assessment/Plan:     1. Upper respiratory tract infection, unspecified type  Discussed likely viral, supportive and otc meds discussed. Work note given

## 2018-06-21 ENCOUNTER — APPOINTMENT (OUTPATIENT)
Dept: HEALTH INFORMATION MANAGEMENT | Facility: MEDICAL CENTER | Age: 68
End: 2018-06-21
Payer: COMMERCIAL

## 2018-06-22 ENCOUNTER — HOSPITAL ENCOUNTER (OUTPATIENT)
Dept: RADIOLOGY | Facility: MEDICAL CENTER | Age: 68
End: 2018-06-22
Attending: NURSE PRACTITIONER
Payer: COMMERCIAL

## 2018-06-22 DIAGNOSIS — R06.00 DYSPNEA, PAROXYSMAL NOCTURNAL: ICD-10-CM

## 2018-06-22 DIAGNOSIS — R06.2 WHEEZING: ICD-10-CM

## 2018-06-22 PROCEDURE — 71046 X-RAY EXAM CHEST 2 VIEWS: CPT

## 2018-07-12 DIAGNOSIS — K59.9 SPASTIC INTESTINE: ICD-10-CM

## 2018-07-12 RX ORDER — HYOSCYAMINE SULFATE 0.125 MG
125 TABLET ORAL EVERY 4 HOURS PRN
Qty: 30 TAB | Refills: 1 | Status: SHIPPED | OUTPATIENT
Start: 2018-07-12 | End: 2019-01-24 | Stop reason: SDUPTHER

## 2018-07-16 DIAGNOSIS — N52.9 ERECTILE DYSFUNCTION, UNSPECIFIED ERECTILE DYSFUNCTION TYPE: ICD-10-CM

## 2018-07-16 RX ORDER — SILDENAFIL 50 MG/1
50 TABLET, FILM COATED ORAL
Qty: 9 TAB | Refills: 4 | Status: SHIPPED | OUTPATIENT
Start: 2018-07-16 | End: 2018-09-24 | Stop reason: SDUPTHER

## 2018-08-06 NOTE — PROGRESS NOTES
Bariatric Medicine H&P  Chief Complaint   Patient presents with   • Weight Gain       Referred by:  Levi Akers M.D.    History of Present Illness:   Heber Jacobs is a 68 y.o.  male who presents for weight management and to help address co-morbidities related to overweight, including dyslipidemia.    The patient would like to lose 20 pounds. He realizes he has slowly gained weight and wants to improve his health. In the 1990s, he lost a lot of weight using the "[employer] diet. He repeated this twice again but did not lose as much weight. He acknowledges portion sizes and eating late or his downfall, wants to improve those.    Has 2 breakfast due to working as a . Has eggs with escobedo toast and fruit for breakfast, which he often has in a restaurant. Before work he has a small similar breakfast. Lunch soup or sandwich. Sometimes he doesn't have time to eat lunch. For dinner he eats a lot, large salad, pasta, meats, and sits at his computer eating a lot of nuts at night. He drinks chocolate milk and 3 cups of coffee per day. He does not drink much water.    He is willing to track his intake. He is willing to use meal replacements during the day at work when he does not have time otherwise to eat. He is not interested in weight loss medication at this time.    The patient is on a statin for hypercholesterolemia.    Behavior-Related History:  Binge eating screen: Negative       Exercise:   Goes to the gym 2-3 times per week    Review of Systems   Snoring, polyuria  Sleep apnea screen: Negative  All other ROS were reviewed with patient today and are negative.      PMH/PSH:  I have reviewed the patient's medical, social and family history, allergies, and medications today.  Prior records reviewed.  FHx Obesity: No  Personal Hx of Bariatric Surgery: No  Used to work in the [employer] for many years, now just on weekends, also works as a  for the [employer] during the week.  "Lives alone.      Physical Exam:   /76   Pulse 75   Ht 1.778 m (5' 10\")   Wt 92.4 kg (203 lb 9.6 oz)   SpO2 96%   BMI 29.21 kg/m²   Waist: 42 in  Body fat % 29  REE 1671 kcal/day    Constitutional: Oriented to person, place, and time and well-developed, well-nourished, and in no distress.    HENT: No facial plethora.  No Cushingoid features.  No scalloped tongue.  No dental erosions.  No swollen parotids.  Head: Normocephalic.   Eyes: EOM are normal. Pupils are equal, round, and reactive to light. No periorbital edema.  No lateral thinning of eyebrows.  No vertical nystagmus.  Neck: Normal range of motion. Neck supple. No thyromegaly present. No buffalo hump.  Cardiovascular: Normal rate and regular rhythm.  No murmur heard.  Pulmonary/Chest: Effort normal and breath sounds normal. No wheezes.   Abdominal: Soft. Bowel sounds are normal. No pannus but distended abdomen.  No ascites.  No hepatosplenomegaly.  No red striae.  Musculoskeletal: Normal range of motion. No edema.   Neurological: Alert and oriented to person, place, and time. Normal reflexes. No cranial nerve deficit. No muscle weakness.  Gait normal.   Skin: Warm and dry. Not diaphoretic. No hirsuitism.  No acanthosis nigricans.  Not excessively dry, scaly.  No acne.  No bruising/ecchymosis.  No hyperpigmentation.  No xanthomas or acrochordon.  No violaceous striae.  No keratosis pilaris.  Psychiatric: Mood, memory, affect and judgment normal.     Laboratory:   Prior labs reviewed.  EKG: Rate 65, sinus rhythm, no significant ST-T abnormalities, corrected QT 0.408  Ordered and reviewed by me today.    Dietitian Assessment: I have reviewed the Dietitian's assessment related to this encounter.       ASSESSMENT/PLAN:  Body mass index is 29.21 kg/m².   Obesity Stage ([place]) 1; Class overweight    1. Overweight (BMI 25.0-29.9)     2. Dyslipidemia       The patient appears ready for change. Likely his calorie intake is much higher than his 1600 " resting energy expenditure, needs to reduce portion sizes and watch his carbohydrate intake. Meal replacement instead of restaurant in the morning should help, portion control for evening snack with Robard snack bar for limiting snack size will help significantly. Will monitor lipids as he progresses through the program.    The patient and I have discussed at length and agree to the following recommendations, which are all addressing the above diagnoses:     Weight loss: 20-25 pound goal  Diet: Keep total daily kcal <1600 to start, total daily carbs < 100 g per day and >100 g protein per day  64+ oz water per day  One glass milk per day  Track intake with My Fitness Pal  One Robard shake mid-morning daily while working, instead of restaurant  Consider instead of nuts at night using Robard snack bar for some nights  If having nuts, measure portion sizes first  Exercise: Walking daily instead of going to a restaurant on work days, going to the gym 3 days per week  Medications: None.  Behavior change: Mindful eating, better planning, stimulus narrowing with meal replacement  Follow-up: 2 weeks        Thank you for your referral!   No oral lesions; no gross abnormalities

## 2018-09-24 DIAGNOSIS — N52.9 ERECTILE DYSFUNCTION, UNSPECIFIED ERECTILE DYSFUNCTION TYPE: ICD-10-CM

## 2018-09-24 RX ORDER — SILDENAFIL 50 MG/1
50 TABLET, FILM COATED ORAL
Qty: 9 TAB | Refills: 6 | Status: SHIPPED | OUTPATIENT
Start: 2018-09-24 | End: 2019-01-15 | Stop reason: SDUPTHER

## 2018-10-17 ENCOUNTER — OFFICE VISIT (OUTPATIENT)
Dept: MEDICAL GROUP | Facility: MEDICAL CENTER | Age: 68
End: 2018-10-17
Payer: COMMERCIAL

## 2018-10-17 VITALS
TEMPERATURE: 99.1 F | SYSTOLIC BLOOD PRESSURE: 140 MMHG | RESPIRATION RATE: 16 BRPM | DIASTOLIC BLOOD PRESSURE: 76 MMHG | OXYGEN SATURATION: 97 % | BODY MASS INDEX: 28.63 KG/M2 | HEIGHT: 70 IN | HEART RATE: 76 BPM | WEIGHT: 200 LBS

## 2018-10-17 DIAGNOSIS — J06.9 URI WITH COUGH AND CONGESTION: ICD-10-CM

## 2018-10-17 DIAGNOSIS — R06.2 EXPIRATORY WHEEZING: ICD-10-CM

## 2018-10-17 PROCEDURE — 99214 OFFICE O/P EST MOD 30 MIN: CPT | Performed by: FAMILY MEDICINE

## 2018-10-17 RX ORDER — FLUTICASONE PROPIONATE 50 MCG
2 SPRAY, SUSPENSION (ML) NASAL DAILY
Qty: 16 G | Refills: 1 | Status: SHIPPED | OUTPATIENT
Start: 2018-10-17 | End: 2019-04-29

## 2018-10-17 NOTE — LETTER
October 17, 2018       Patient: Heber Jacobs   YOB: 1950   Date of Visit: 10/17/2018         To Whom It May Concern:    It is my medical opinion that Heber Jacobs remain out of work until 10/22/18.    If you have any questions or concerns, please don't hesitate to call [phone]          Sincerely,          Fouzia Peres M.D.  Electronically Signed

## 2018-10-17 NOTE — PROGRESS NOTES
CC: Cough and congestion    HPI:    The patient is a 68-year-old white male who complains of head congestion and cough for 1 day.  His throat is sore on the left side.      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   • Left-sided low back pain without sciatica 06/21/2016   • Spastic intestine 07/23/2015   • Dyspepsia 07/23/2015   • Other male erectile dysfunction 07/23/2015   • Dyslipidemia 07/21/2015         Current Outpatient Prescriptions:   •  fluticasone (FLONASE) 50 MCG/ACT nasal spray, Spray 2 Sprays in nose every day., Disp: 16 g, Rfl: 1  •  sildenafil citrate (VIAGRA) 50 MG tablet, Take 1 Tab by mouth 1 time daily as needed for Erectile Dysfunction., Disp: 9 Tab, Rfl: 6  •  hyoscyamine (ANASPAZ, LEVSIN) 0.125 MG tablet, Take 1 Tab by mouth every four hours as needed for Cramping or Diarrhea., Disp: 30 Tab, Rfl: 1  •  hydrocortisone rectal (PROCTOZONE HC) 2.5 % Cream, Apply to skin around rectal area externally 2-4 times daily as needed., Disp: 1 Tube, Rfl: 2  •  hydrocortisone (ANUSOL-HC) 25 MG Suppos, Insert 1 Suppository in rectum every 12 hours., Disp: 20 Suppository, Rfl: 0  •  aspirin (ASA) 81 MG Chew Tab chewable tablet, Take 1 Tab by mouth every day., Disp: 100 Tab, Rfl: 0  •  simvastatin (ZOCOR) 20 MG Tab, Take 1 Tab by mouth every evening., Disp: 90 Tab, Rfl: 3  •  finasteride (PROSCAR) 5 MG Tab, Take 1 Tab by mouth every day., Disp: 90 Tab, Rfl: 3  •  Coenzyme Q10 (COQ-10) 200 MG Cap, Take  by mouth., Disp: , Rfl:   •  omeprazole (PRILOSEC) 20 MG delayed-release capsule, Take 1 Cap by mouth every day., Disp: 90 Cap, Rfl: 3  •  Ascorbic Acid Buffered (BUFFERED VITAMIN C) 1000 MG  "Cap, Take 3,000 mg by mouth 2 Times a Day., Disp: 180 Cap, Rfl: 6  •  therapeutic multivitamin-minerals (THERAGRAN-M) Tab, Take 1 Tab by mouth every day., Disp: 30 Tab, Rfl: 11  •  Omega-3 Fatty Acids (FISH OIL) 1200 MG Cap, Take  by mouth., Disp: , Rfl:     Allergies as of 10/17/2018   • (No Known Allergies)       Social History     Social History   • Marital status: Single     Spouse name: N/A   • Number of children: N/A   • Years of education: N/A     Occupational History   • Not on file.     Social History Main Topics   • Smoking status: Never Smoker   • Smokeless tobacco: Never Used   • Alcohol use 0.0 oz/week      Comment: Occasional   • Drug use: No   • Sexual activity: No     Other Topics Concern   • Not on file     Social History Narrative   • No narrative on file       Family History   Problem Relation Age of Onset   • Cancer Mother         breast cancer   • Heart Disease Father    • Cancer Brother         leukemia       Past Medical History:   Diagnosis Date   • Downey esophagus    • GERD (gastroesophageal reflux disease)    • Hemorrhoid    • Hyperlipidemia    • Nocturia        Past Surgical History:   Procedure Laterality Date   • KNEE ARTHROSCOPY  9/ 2013    right knee meniscus tear   • EXPLORATORY LAPAROTOMY         ROS:  As documented above in the history of present illness.    /76 (BP Location: Right arm, Patient Position: Sitting, BP Cuff Size: Adult)   Pulse 76   Temp 37.3 °C (99.1 °F) (Temporal)   Resp 16   Ht 1.778 m (5' 10\")   Wt 90.7 kg (200 lb)   SpO2 97%   BMI 28.70 kg/m²     Physical Exam:      GEN:  Alert, oriented, in no distress  HEENT;  PERRLA, EOMI, TM's clear bilaterally, oropharynx clear without erythema or exudates.  Nasal passages show clear mucus and erythematous mucosa bilaterally.  NECK;  Supple without adenopathy   LUNGS: Faint expiratory wheezes with forced expiration in the Right Lower Lung Fld. and Left Upper Lung Fld.  CV:  Heart RRR without murmurs or S3 or " S4; peripheral pulses intact.  EXT:  Without cyanosis, clubbing, or edema.  NEURO:  Cranial nerves II through XII intact.  Motor function and sensation intact.      Assessment and Plan:     1. URI with cough and congestion  -Supportive measures including extra sleep, hot fluids, rest  - fluticasone (FLONASE) 50 MCG/ACT nasal spray; Spray 2 Sprays in nose every day.  Dispense: 16 g; Refill: 1  -Recommended Mucinex for congestion  -He is a .  He is given a letter to stay off of work the rest of the week.    2. Expiratory wheezing  -He says he has albuterol inhaler at home that he got from his allergist about 3 months ago but has never used.  He will start using it.  I gave him instructions to use this every 4-6 hours for the next few days.      Follow-up with PCP if symptoms do not resolve with treatment.          Please note that this dictation was created using voice recognition software. I have worked with consultants from the vendor as well as technical experts from [employer] to optimize the interface. I have made every reasonable attempt to correct obvious errors, but I expect there are errors of kendra and possibly content that I did not discover before finalizing the note.

## 2018-10-23 ENCOUNTER — HOSPITAL ENCOUNTER (OUTPATIENT)
Facility: MEDICAL CENTER | Age: 68
End: 2018-10-23
Payer: COMMERCIAL

## 2018-10-23 LAB
BDY FAT % MEASURED: 29.2 %
BP DIAS: 82 MMHG
BP SYS: 128 MMHG
DIABETES HTDIA: NO
EVENT NAME HTEVT: NORMAL
HYPERTENSION HTHYP: NO
SCREENING LOC CITY HTCIT: NORMAL
SCREENING LOC STATE HTSTA: NORMAL
SCREENING LOCATION HTLOC: NORMAL
SUBSCRIBER ID HTSID: NORMAL

## 2018-10-24 LAB
CHOLEST SERPL-MCNC: 187 MG/DL (ref 100–199)
FASTING STATUS PATIENT QL REPORTED: NORMAL
GLUCOSE SERPL-MCNC: 89 MG/DL (ref 65–99)
HDLC SERPL-MCNC: 51 MG/DL
LDLC SERPL CALC-MCNC: 106 MG/DL
TRIGL SERPL-MCNC: 151 MG/DL (ref 0–149)

## 2018-12-18 ENCOUNTER — OFFICE VISIT (OUTPATIENT)
Dept: MEDICAL GROUP | Facility: MEDICAL CENTER | Age: 68
End: 2018-12-18
Payer: COMMERCIAL

## 2018-12-18 VITALS
HEIGHT: 70 IN | OXYGEN SATURATION: 95 % | HEART RATE: 76 BPM | BODY MASS INDEX: 28.5 KG/M2 | WEIGHT: 199.08 LBS | DIASTOLIC BLOOD PRESSURE: 74 MMHG | TEMPERATURE: 97.4 F | RESPIRATION RATE: 16 BRPM | SYSTOLIC BLOOD PRESSURE: 118 MMHG

## 2018-12-18 DIAGNOSIS — G89.29 CHRONIC LEFT-SIDED LOW BACK PAIN WITHOUT SCIATICA: ICD-10-CM

## 2018-12-18 DIAGNOSIS — M54.50 CHRONIC LEFT-SIDED LOW BACK PAIN WITHOUT SCIATICA: ICD-10-CM

## 2018-12-18 PROCEDURE — 99214 OFFICE O/P EST MOD 30 MIN: CPT | Performed by: FAMILY MEDICINE

## 2018-12-18 RX ORDER — CYCLOBENZAPRINE HCL 5 MG
5-10 TABLET ORAL 2 TIMES DAILY PRN
Qty: 10 TAB | Refills: 0 | Status: SHIPPED | OUTPATIENT
Start: 2018-12-18 | End: 2018-12-31

## 2018-12-18 NOTE — LETTER
December 18, 2018    To Whom It May Concern:         This is confirmation that Heber Jacobs attended his scheduled appointment with Josefina Barton M.D. on 12/18/18.         If you have any questions please do not hesitate to call me at the [phone]  Sincerely,          Josefina Barton M.D

## 2018-12-18 NOTE — PROGRESS NOTES
CC: Lower back pain    HPI:   Heber presents today for back pain.  Patient has history of of chronic back pain that has always been adequately controlled on over-the-counter pain medication as needed.  However lately his lower back pain has worsened after she carried a heavy object, few days ago.  The pain is localized on the lower left back, nonradiating, increased with side-to-side movement of the back, has been taking Aleve as needed and has been helping.  Denies any weakness or numbness of the leg.  Has been having normal urinary and bowel control.  Patient had a lumbar x-ray in April 2018 showed mild degenerative changes, and some facet arthropathy.      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 10 Tab 0   • fluticasone (FLONASE) 50 MCG/ACT nasal spray Spray 2 Sprays in nose every day. 16 g 1   • sildenafil citrate (VIAGRA) 50 MG tablet Take 1 Tab by mouth 1 time daily as needed for Erectile Dysfunction. 9 Tab 6   • hyoscyamine (ANASPAZ, LEVSIN) 0.125 MG tablet Take 1 Tab by mouth every four hours as needed for Cramping or Diarrhea. 30 Tab 1   • hydrocortisone rectal (PROCTOZONE HC) 2.5 % Cream Apply to skin around rectal area externally 2-4 times daily as needed. 1 Tube 2   • hydrocortisone (ANUSOL-HC) 25 MG Suppos Insert 1 Suppository in rectum  "every 12 hours. 20 Suppository 0   • aspirin (ASA) 81 MG Chew Tab chewable tablet Take 1 Tab by mouth every day. 100 Tab 0   • simvastatin (ZOCOR) 20 MG Tab Take 1 Tab by mouth every evening. 90 Tab 3   • finasteride (PROSCAR) 5 MG Tab Take 1 Tab by mouth every day. 90 Tab 3   • Coenzyme Q10 (COQ-10) 200 MG Cap Take  by mouth.     • omeprazole (PRILOSEC) 20 MG delayed-release capsule Take 1 Cap by mouth every day. 90 Cap 3   • Ascorbic Acid Buffered (BUFFERED VITAMIN C) 1000 MG Cap Take 3,000 mg by mouth 2 Times a Day. 180 Cap 6   • therapeutic multivitamin-minerals (THERAGRAN-M) Tab Take 1 Tab by mouth every day. 30 Tab 11   • Omega-3 Fatty Acids (FISH OIL) 1200 MG Cap Take  by mouth.       No current facility-administered medications for this visit.          Allergies as of 12/18/2018   • (No Known Allergies)        ROS: Denies any chest pain, Shortness of breath, Changes bowel or bladder, Lower extremity edema.    Physical Exam:  /74 (BP Location: Right arm, Patient Position: Sitting, BP Cuff Size: Adult)   Pulse 76   Temp 36.3 °C (97.4 °F) (Temporal)   Resp 16   Ht 1.778 m (5' 10\")   Wt 90.3 kg (199 lb 1.2 oz)   SpO2 95%   BMI 28.56 kg/m²   Gen.: Well-developed, well-nourished, no apparent distress,pleasant and cooperative with the examination  Cor: Regular rate and rhythm without murmur, gallop or rub.  Back: Decreased side to side movement, mild tenderness on the paraspinal muscles on the left side.  No bony tenderness.      Assessment and Plan.   68 y.o. male     1. Chronic left-sided low back pain without sciatica  Probably muscular pain.  Possibly facet arthropathy.  I recommend continue taking naproxen twice a day for 2 days then as needed,  Muscle relaxant prescribed.  Recommend back exercise.  Patient left before I provide him with a booklet showing recommended back exercises.  Return to the clinic if the condition gets worse.    - cyclobenzaprine (FLEXERIL) 5 MG tablet; Take 1-2 Tabs by " mouth 2 times a day as needed.  Dispense: 10 Tab; Refill: 0

## 2018-12-31 ENCOUNTER — OFFICE VISIT (OUTPATIENT)
Dept: MEDICAL GROUP | Facility: MEDICAL CENTER | Age: 68
End: 2018-12-31
Payer: COMMERCIAL

## 2018-12-31 VITALS
TEMPERATURE: 97.3 F | HEIGHT: 70 IN | BODY MASS INDEX: 27.92 KG/M2 | DIASTOLIC BLOOD PRESSURE: 68 MMHG | OXYGEN SATURATION: 99 % | WEIGHT: 195 LBS | SYSTOLIC BLOOD PRESSURE: 128 MMHG | HEART RATE: 71 BPM | RESPIRATION RATE: 18 BRPM

## 2018-12-31 DIAGNOSIS — K64.8 OTHER HEMORRHOIDS: ICD-10-CM

## 2018-12-31 DIAGNOSIS — M53.3 SI (SACROILIAC) PAIN: ICD-10-CM

## 2018-12-31 DIAGNOSIS — Z12.11 COLON CANCER SCREENING: ICD-10-CM

## 2018-12-31 DIAGNOSIS — N52.9 ERECTILE DYSFUNCTION, UNSPECIFIED ERECTILE DYSFUNCTION TYPE: ICD-10-CM

## 2018-12-31 DIAGNOSIS — Z00.00 HEALTHCARE MAINTENANCE: ICD-10-CM

## 2018-12-31 DIAGNOSIS — E66.3 OVERWEIGHT: ICD-10-CM

## 2018-12-31 PROCEDURE — 99214 OFFICE O/P EST MOD 30 MIN: CPT | Performed by: FAMILY MEDICINE

## 2018-12-31 NOTE — PROGRESS NOTES
[facility]  Progress Note  Established Patient    Subjective:   Heber Jacobs is a 68 y.o. male here today with a chief complaint of ED. The patient is alone.     Erectile dysfunction  Patient has erectile dysfunction.  He tried sildenafil 50 mg with only about a 10% improvement in symptoms.  Testosterone testing is normal.    Other hemorrhoids  Patient has hemorrhoids.  He recently had an evacuated thrombosed hemorrhoid.  He is interested in seeing gastroenterology for screening colonoscopy and hemorrhoid evaluation.    SI (sacroiliac) pain  Patient describes left lower back pain without sciatica.  We did obtain x-rays several months back which demonstrated spinal degenerative changes. Flexeril does help this pain.    Overweight  Patient is continuing to work on diet and exercise.      Current Outpatient Prescriptions on File Prior to Visit   Medication Sig Dispense Refill   • aspirin (ASA) 81 MG Chew Tab chewable tablet Take 1 Tab by mouth every day. 100 Tab 0   • simvastatin (ZOCOR) 20 MG Tab Take 1 Tab by mouth every evening. 90 Tab 3   • omeprazole (PRILOSEC) 20 MG delayed-release capsule Take 1 Cap by mouth every day. 90 Cap 3   • therapeutic multivitamin-minerals (THERAGRAN-M) Tab Take 1 Tab by mouth every day. 30 Tab 11   • Omega-3 Fatty Acids (FISH OIL) 1200 MG Cap Take  by mouth.     • fluticasone (FLONASE) 50 MCG/ACT nasal spray Spray 2 Sprays in nose every day. 16 g 1   • sildenafil citrate (VIAGRA) 50 MG tablet Take 1 Tab by mouth 1 time daily as needed for Erectile Dysfunction. 9 Tab 6   • hyoscyamine (ANASPAZ, LEVSIN) 0.125 MG tablet Take 1 Tab by mouth every four hours as needed for Cramping or Diarrhea. 30 Tab 1   • hydrocortisone rectal (PROCTOZONE HC) 2.5 % Cream Apply to skin around rectal area externally 2-4 times daily as needed. 1 Tube 2   • hydrocortisone (ANUSOL-HC) 25 MG Suppos Insert 1 Suppository in rectum every 12 hours. 20 Suppository 0   • Coenzyme Q10 (COQ-10) 200 MG  "Cap Take  by mouth.       No current facility-administered medications on file prior to visit.        Past Medical History:   Diagnosis Date   • Downey esophagus    • GERD (gastroesophageal reflux disease)    • Hemorrhoid    • Hyperlipidemia    • Nocturia        Allergies: Patient has no known allergies.    Surgical History:  has a past surgical history that includes knee arthroscopy (9/ 2013) and exploratory laparotomy.    Family History: family history includes Cancer in his brother and mother; Heart Disease in his father.    Social History:  reports that he has never smoked. He has never used smokeless tobacco. He reports that he drinks alcohol. He reports that he does not use drugs.    ROS: no fever or nausea.       Objective:     Vitals:    12/31/18 0937   BP: 128/68   BP Location: Left arm   Patient Position: Sitting   BP Cuff Size: Large adult   Pulse: 71   Resp: 18   Temp: 36.3 °C (97.3 °F)   TempSrc: Temporal   SpO2: 99%   Weight: 88.5 kg (195 lb)   Height: 1.778 m (5' 10\")       Physical Exam:  General: alert in no apparent distress.   Back: no TTP over L SI region.         Assessment and Plan:     1. Colon cancer screening  - REFERRAL TO GASTROENTEROLOGY    2. Erectile dysfunction, unspecified erectile dysfunction type  - try using viagra 100 mg daily PRN. If fails, will try cialis.     3. SI (sacroiliac) bethany  - REFERRAL TO MASSAGE THERAPY    4. Other hemorrhoids  - GI.     5. Overweight  - encouraged diet/exercise.         Followup: Return in about 6 months (around 6/30/2019), or if symptoms worsen or fail to improve.         "

## 2018-12-31 NOTE — ASSESSMENT & PLAN NOTE
Patient has hemorrhoids.  He recently had an evacuated thrombosed hemorrhoid.  He is interested in seeing gastroenterology for screening colonoscopy and hemorrhoid evaluation.

## 2018-12-31 NOTE — ASSESSMENT & PLAN NOTE
Lipids: Done in 2018, 10 year ACC risk 14%. Starting statin and aspirin.  Fasting Glucose: 2018 was normal.  Hepatitis C Screen: 2018 was normal.  Colonoscopy: Records requested from the [facility].    Flu vaccine: given 2017 per patient.   Pneumonia vaccine: Prevnar given 02/07/18.   Tdap: done 5 years ago per patient.   Zoster vaccine: given at 63 y/o per patient.

## 2018-12-31 NOTE — ASSESSMENT & PLAN NOTE
Patient describes left lower back pain without sciatica.  We did obtain x-rays several months back which demonstrated spinal degenerative changes. Flexeril does help this pain.

## 2018-12-31 NOTE — LETTER
[facility]  Levi Akers M.D.  [address]  [place] [address]  Fax: [phone]   Authorization for Release/Disclosure of   Protected Health Information   Name: JOHNNA JACOBS : 1950 SSN: [id number]   Address: [facility] [address] [address] Phone:    [phone] (home)    I authorize the entity listed below to release/disclose the PHI below to:   [facility]/Levi Akers M.D. and Levi Akers M.D.   Provider or Entity Name:  [facility]   City, State, [facility]   Phone:[phone]    Fax:[phone]   Reason for request: continuity of care   Information to be released:    [  ] LAST COLONOSCOPY,  including any PATH REPORT and follow-up  [  ] LAST FIT/COLOGUARD RESULT [  ] LAST DEXA  [  ] LAST MAMMOGRAM  [  ] LAST PAP  [  ] LAST LABS [  ] RETINA EXAM REPORT  [  ] IMMUNIZATION RECORDS  [  ] Release all info      [  ] Check here and initial the line next to each item to release ALL health information INCLUDING  _____ Care and treatment for drug and / or alcohol abuse  _____ HIV testing, infection status, or AIDS  _____ Genetic Testing    DATES OF SERVICE OR TIME PERIOD TO BE DISCLOSED: _____________  I understand and acknowledge that:  * This Authorization may be revoked at any time by you in writing, except if your health information has already been used or disclosed.  * Your health information that will be used or disclosed as a result of you signing this authorization could be re-disclosed by the recipient. If this occurs, your re-disclosed health information may no longer be protected by State or Federal laws.  * You may refuse to sign this Authorization. Your refusal will not affect your ability to obtain treatment.  * This Authorization becomes effective upon signing and will  on (date) __________.      If no date is indicated, this Authorization will  one (1) year from the signature date.    Name: Johnna Jacobs    Signature:   Date:          12/31/2018       PLEASE FAX REQUESTED RECORDS BACK TO: [phone]

## 2018-12-31 NOTE — ASSESSMENT & PLAN NOTE
Patient has erectile dysfunction.  He tried sildenafil 50 mg with only about a 10% improvement in symptoms.  Testosterone testing is normal.

## 2019-01-15 DIAGNOSIS — N52.9 ERECTILE DYSFUNCTION, UNSPECIFIED ERECTILE DYSFUNCTION TYPE: ICD-10-CM

## 2019-01-15 RX ORDER — SILDENAFIL 50 MG/1
50-100 TABLET, FILM COATED ORAL
Qty: 30 TAB | Refills: 6 | Status: SHIPPED | OUTPATIENT
Start: 2019-01-15 | End: 2019-04-26 | Stop reason: SDUPTHER

## 2019-01-15 NOTE — TELEPHONE ENCOUNTER
Was the patient seen in the last year in this department? Yes    Does patient have an active prescription for medications requested? No     Received Request Via: Pharmacy     Patients pharmacy never received the order for  9/24/2018

## 2019-01-23 ENCOUNTER — PATIENT MESSAGE (OUTPATIENT)
Dept: MEDICAL GROUP | Facility: MEDICAL CENTER | Age: 69
End: 2019-01-23

## 2019-01-23 DIAGNOSIS — K59.9 SPASTIC INTESTINE: ICD-10-CM

## 2019-01-24 RX ORDER — HYOSCYAMINE SULFATE 0.125 MG
125 TABLET ORAL EVERY 4 HOURS PRN
Qty: 30 TAB | Refills: 1 | Status: SHIPPED | OUTPATIENT
Start: 2019-01-24 | End: 2019-04-29

## 2019-01-25 DIAGNOSIS — K59.9 SPASTIC INTESTINE: ICD-10-CM

## 2019-01-25 NOTE — TELEPHONE ENCOUNTER
Was the patient seen in the last year in this department? Yes    Does patient have an active prescription for medications requested? Yes    Received Request Via: Pharmacy     Patient is requesting Sublingual tablets

## 2019-01-27 RX ORDER — HYOSCYAMINE SULFATE 0.125 MG
125 TABLET ORAL EVERY 4 HOURS PRN
Qty: 30 TAB | Refills: 1 | Status: CANCELLED | OUTPATIENT
Start: 2019-01-27

## 2019-03-19 DIAGNOSIS — E78.5 DYSLIPIDEMIA: ICD-10-CM

## 2019-03-19 DIAGNOSIS — Z00.00 HEALTHCARE MAINTENANCE: ICD-10-CM

## 2019-03-19 DIAGNOSIS — K22.719 BARRETT'S ESOPHAGUS WITH DYSPLASIA: ICD-10-CM

## 2019-03-19 RX ORDER — OMEPRAZOLE 20 MG/1
20 CAPSULE, DELAYED RELEASE ORAL DAILY
Qty: 90 CAP | Refills: 3 | Status: SHIPPED | OUTPATIENT
Start: 2019-03-19 | End: 2020-06-18 | Stop reason: SDUPTHER

## 2019-03-19 RX ORDER — SIMVASTATIN 20 MG
20 TABLET ORAL EVERY EVENING
Qty: 90 TAB | Refills: 3 | Status: SHIPPED | OUTPATIENT
Start: 2019-03-19 | End: 2020-03-02 | Stop reason: SDUPTHER

## 2019-04-26 DIAGNOSIS — N52.9 ERECTILE DYSFUNCTION, UNSPECIFIED ERECTILE DYSFUNCTION TYPE: ICD-10-CM

## 2019-04-26 RX ORDER — SILDENAFIL 50 MG/1
50-100 TABLET, FILM COATED ORAL
Qty: 30 TAB | Refills: 6 | Status: SHIPPED | OUTPATIENT
Start: 2019-04-26 | End: 2020-09-21 | Stop reason: SDUPTHER

## 2019-04-29 ENCOUNTER — OFFICE VISIT (OUTPATIENT)
Dept: MEDICAL GROUP | Facility: MEDICAL CENTER | Age: 69
End: 2019-04-29
Payer: COMMERCIAL

## 2019-04-29 VITALS
RESPIRATION RATE: 18 BRPM | SYSTOLIC BLOOD PRESSURE: 124 MMHG | TEMPERATURE: 98 F | DIASTOLIC BLOOD PRESSURE: 70 MMHG | BODY MASS INDEX: 28.55 KG/M2 | WEIGHT: 199.4 LBS | OXYGEN SATURATION: 95 % | HEIGHT: 70 IN | HEART RATE: 65 BPM

## 2019-04-29 DIAGNOSIS — L84 CORN: ICD-10-CM

## 2019-04-29 DIAGNOSIS — G57.61 MORTON'S NEUROMA OF RIGHT FOOT: ICD-10-CM

## 2019-04-29 PROBLEM — M79.672 BILATERAL FOOT PAIN: Status: RESOLVED | Noted: 2019-04-29 | Resolved: 2019-04-29

## 2019-04-29 PROBLEM — M79.671 BILATERAL FOOT PAIN: Status: RESOLVED | Noted: 2019-04-29 | Resolved: 2019-04-29

## 2019-04-29 PROBLEM — M79.672 BILATERAL FOOT PAIN: Status: ACTIVE | Noted: 2019-04-29

## 2019-04-29 PROBLEM — M79.671 BILATERAL FOOT PAIN: Status: ACTIVE | Noted: 2019-04-29

## 2019-04-29 PROCEDURE — 99213 OFFICE O/P EST LOW 20 MIN: CPT | Performed by: FAMILY MEDICINE

## 2019-04-29 ASSESSMENT — PATIENT HEALTH QUESTIONNAIRE - PHQ9: CLINICAL INTERPRETATION OF PHQ2 SCORE: 0

## 2019-04-29 NOTE — ASSESSMENT & PLAN NOTE
Patient describes 10 days of progressively worsening right third digit plantar pain at the base of the third metatarsal.  There is no known trauma or injury.  Pressure exacerbates the pain.  It is rated 3 out of 10 in severity.  Patient thinks that some old shoes may have contributed to development of this pain.

## 2019-04-29 NOTE — PATIENT INSTRUCTIONS
Corn Treatment:   Leave Salicylic acid in place for 24 hours. Remove every 24 hours and gently use a pumice stone to remove the top layer of skin.   Replace the the patch until gone.   If the condition worsens, please let me know.     ------------------------------    Parmar Neuralgia  Introduction  Parmar neuralgia is a type of foot pain in the area closest to your toes. This area is sometimes called the ball of your foot. Parmar neuralgia occurs when a branch of a nerve in your foot (digital nerve) becomes compressed.  When this happens over a long period of time, the nerve can thicken (neuroma) and cause pain. This usually occurs between the third and fourth toe. Parmar neuralgia can come and go but may get worse over time.  What are the causes?  Your digital nerve can become compressed and stretched at a point where it passes under a thick band of tissue that connects your toes (intermetatarsal ligament). Parmar neuralgia can be caused by mild repetitive damage in this area. This type of damage can result from:  · Activities such as running or jumping.  · Wearing shoes that are too tight.  What increases the risk?  You may be at risk for Parmar neuralgia if you:  · Are female.  · Wear high heels.  · Wear shoes that are narrow or tight.  · Participate in activities that stretch your toes. These include:  ¨ Running.  ¨ Ballet.  ¨ Long-distance walking.  What are the signs or symptoms?  The first symptom of Parmar neuralgia is pain that spreads from the ball of your foot to your toes. It may feel like you are walking on a marble. Pain usually gets worse with walking and goes away at night. Other symptoms may include numbness and cramping of your toes.  How is this diagnosed?  Your health care provider will do a physical exam. When doing the exam, your health care provider may:  · Squeeze your foot just behind your toe.  · Ask you to move your toes to check for pain.  You may also have tests on your foot to confirm  the diagnosis. These may include:  · An X-ray.  · An MRI.  How is this treated?  Treatment for Parmar neuralgia may be as simple as changing the kind of shoes you wear. Other treatments may include:  · Wearing a supportive pad (orthosis) under the front of your foot. This lifts your toe bones and takes pressure off the nerve.  · Getting injections of numbing medicine and anti-inflammatory medicine (steroid) in the nerve.  · Having surgery to remove part of the thickened nerve.  Follow these instructions at home:  · Take medicine only as directed by your health care provider.  · Wear soft-soled shoes with a wide toe area.  · Stop activities that may be causing pain.  · Elevate your foot when resting.  · Massage your foot.  · Apply ice to the injured area:  ¨ Put ice in a plastic bag.  ¨ Place a towel between your skin and the bag.  ¨ Leave the ice on for 20 minutes, 2-3 times a day.  · Keep all follow-up visits as directed by your health care provider. This is important.  Contact a health care provider if:  · Home care instructions are not helping you get better.  · Your symptoms change or get worse.  This information is not intended to replace advice given to you by your health care provider. Make sure you discuss any questions you have with your health care provider.  Document Released: 03/26/2002 Document Revised: 05/25/2017 Document Reviewed: 02/18/2015  © 2017 [employer]vier

## 2019-04-29 NOTE — ASSESSMENT & PLAN NOTE
10 days ago old shoes, not miproved after stopping shoes. Worsened. L most concerend, something in center of it.

## 2019-04-29 NOTE — PROGRESS NOTES
[facility]  Progress Note  Established Patient    Subjective:   Heber Jacobs is a 69 y.o. male here today with a chief complaint of foot pain. The patient is alone.     Edwards  Patient describes 10 days of progressively worsening left lateral foot discomfort.  He believes that some old shoes may have contributed to this problem and he tossed them, however, the symptoms persist.  There is no known trauma or injury.  There pain is rated 3 out of 10 in severity.  There are no known alleviating factors.  Pressure tends to aggravate it.    Parmar's neuroma of right foot  Patient describes 10 days of progressively worsening right third digit plantar pain at the base of the third metatarsal.  There is no known trauma or injury.  Pressure exacerbates the pain.  It is rated 3 out of 10 in severity.  Patient thinks that some old shoes may have contributed to development of this pain.      Current Outpatient Prescriptions on File Prior to Visit   Medication Sig Dispense Refill   • simvastatin (ZOCOR) 20 MG Tab Take 1 Tab by mouth every evening. 90 Tab 3   • Fiber Powder Take 1 Each by mouth 2 Times a Day.     • aspirin (ASA) 81 MG Chew Tab chewable tablet Take 1 Tab by mouth every day. 100 Tab 0   • therapeutic multivitamin-minerals (THERAGRAN-M) Tab Take 1 Tab by mouth every day. 30 Tab 11   • Omega-3 Fatty Acids (FISH OIL) 1200 MG Cap Take  by mouth.     • sildenafil citrate (VIAGRA) 50 MG tablet Take 1-2 Tabs by mouth 1 time daily as needed for Erectile Dysfunction. 30 Tab 6   • omeprazole (PRILOSEC) 20 MG delayed-release capsule Take 1 Cap by mouth every day. 90 Cap 3     No current facility-administered medications on file prior to visit.        Past Medical History:   Diagnosis Date   • Downey esophagus    • GERD (gastroesophageal reflux disease)    • Hemorrhoid    • Hyperlipidemia    • Nocturia        Allergies: Patient has no known allergies.    Surgical History:  has a past surgical history that  "includes knee arthroscopy (9/ 2013) and exploratory laparotomy.    Family History: family history includes Cancer in his brother and mother; Heart Disease in his father.    Social History:  reports that he has never smoked. He has never used smokeless tobacco. He reports that he drinks alcohol. He reports that he does not use drugs.    ROS: no fever.        Objective:     Vitals:    04/29/19 1110   BP: 124/70   BP Location: Left arm   Patient Position: Sitting   BP Cuff Size: Adult   Pulse: 65   Resp: 18   Temp: 36.7 °C (98 °F)   TempSrc: Temporal   SpO2: 95%   Weight: 90.4 kg (199 lb 6.4 oz)   Height: 1.778 m (5' 10\")       Physical Exam:  General: alert in no apparent distress.   Feet: On the plantar portion of the right foot at the base of the third metatarsal is an area of tenderness on palpation.  There is no redness, warmth, induration or crepitus.  On the left lateral foot there is a punctate skin lesion with some subcutaneous hypertrophy.  There is no redness, warmth, induration or crepitus.        Assessment and Plan:     1. Parmar's neuroma of right foot  R foot.   - wide soled shoes.   - return if worsens or doesn't resolve.  - handout given.     2. Edward  L foot. DDx includes plantar wart.   - recommended Salicylic acid and gentle pumice stone.   - return if worsens or doesn't resolve.        Followup: Return if symptoms worsen or fail to improve.         "

## 2019-04-29 NOTE — ASSESSMENT & PLAN NOTE
Patient describes 10 days of progressively worsening left lateral foot discomfort.  He believes that some old shoes may have contributed to this problem and he tossed them, however, the symptoms persist.  There is no known trauma or injury.  There pain is rated 3 out of 10 in severity.  There are no known alleviating factors.  Pressure tends to aggravate it.

## 2019-07-01 ENCOUNTER — OFFICE VISIT (OUTPATIENT)
Dept: MEDICAL GROUP | Facility: MEDICAL CENTER | Age: 69
End: 2019-07-01
Payer: COMMERCIAL

## 2019-07-01 VITALS
DIASTOLIC BLOOD PRESSURE: 74 MMHG | WEIGHT: 203 LBS | BODY MASS INDEX: 29.06 KG/M2 | OXYGEN SATURATION: 95 % | RESPIRATION RATE: 18 BRPM | TEMPERATURE: 97.6 F | HEART RATE: 65 BPM | SYSTOLIC BLOOD PRESSURE: 122 MMHG | HEIGHT: 70 IN

## 2019-07-01 DIAGNOSIS — E78.5 DYSLIPIDEMIA: ICD-10-CM

## 2019-07-01 DIAGNOSIS — Z00.00 HEALTHCARE MAINTENANCE: ICD-10-CM

## 2019-07-01 DIAGNOSIS — M25.561 ACUTE PAIN OF RIGHT KNEE: ICD-10-CM

## 2019-07-01 DIAGNOSIS — L84 CORN: ICD-10-CM

## 2019-07-01 DIAGNOSIS — K64.8 OTHER HEMORRHOIDS: ICD-10-CM

## 2019-07-01 PROCEDURE — 99214 OFFICE O/P EST MOD 30 MIN: CPT | Mod: 25 | Performed by: FAMILY MEDICINE

## 2019-07-01 PROCEDURE — 99397 PER PM REEVAL EST PAT 65+ YR: CPT | Performed by: FAMILY MEDICINE

## 2019-07-01 NOTE — PROGRESS NOTES
[facility]  Progress Note  Established Patient    Subjective:   Heber Jacobs is a 69 y.o. male here today for a wellness exam and to discuss knee pain. The patient is alone.     Healthcare maintenance  Lipids: ordered.  Fasting Glucose: ordered.  Hepatitis C Screen: 2018 was normal.  Colonoscopy: done 1/2019 with 3 year recall.   PSA: ordered.    Pneumonia vaccine: Prevnar given 2017, Pneumovax given 2018.   Tdap: given 2018.    Shingrix: recommended, patient will get at pharmacy.     Right knee pain  Patient states that he was doing leg curls at the gym 2 weeks ago when he subsequently developed a 1 out of 10 severity right knee pain located anterior laterally.  He states the pain has progressively gotten better.  He has not really tried anything for it beyond reducing some of the weights he uses at the gym in the short-term.    Other hemorrhoids  Patient has hemorrhoids that he is controlling conservatively.  If it gets bad, he will go back to GI.     Dyslipidemia  Patient's dyslipidemia is controlled with simvastatin.    Corn  Patient has a corn on his foot which is doing well with salicylic acid treatments.      Current Outpatient Prescriptions on File Prior to Visit   Medication Sig Dispense Refill   • simvastatin (ZOCOR) 20 MG Tab Take 1 Tab by mouth every evening. 90 Tab 3   • omeprazole (PRILOSEC) 20 MG delayed-release capsule Take 1 Cap by mouth every day. 90 Cap 3   • Fiber Powder Take 1 Each by mouth 2 Times a Day.     • aspirin (ASA) 81 MG Chew Tab chewable tablet Take 1 Tab by mouth every day. 100 Tab 0   • therapeutic multivitamin-minerals (THERAGRAN-M) Tab Take 1 Tab by mouth every day. 30 Tab 11   • Omega-3 Fatty Acids (FISH OIL) 1200 MG Cap Take  by mouth.     • sildenafil citrate (VIAGRA) 50 MG tablet Take 1-2 Tabs by mouth 1 time daily as needed for Erectile Dysfunction. 30 Tab 6     No current facility-administered medications on file prior to visit.        Past Medical History:  "  Diagnosis Date   • Downey esophagus    • GERD (gastroesophageal reflux disease)    • Hemorrhoid    • Hyperlipidemia    • Nocturia        Allergies: Patient has no known allergies.    Surgical History:  has a past surgical history that includes knee arthroscopy (9/ 2013) and exploratory laparotomy.    Family History: family history includes Cancer in his brother and mother; Heart Disease in his father.    Social History:  reports that he has never smoked. He has never used smokeless tobacco. He reports that he drinks alcohol. He reports that he does not use drugs.    ROS: no fever or nausea.        Objective:     Vitals:    07/01/19 0952   BP: 122/74   BP Location: Left arm   Patient Position: Sitting   BP Cuff Size: Adult   Pulse: 65   Resp: 18   Temp: 36.4 °C (97.6 °F)   TempSrc: Temporal   SpO2: 95%   Weight: 92.1 kg (203 lb)   Height: 1.778 m (5' 10\")       Physical Exam:  General: alert in no apparent distress.   Cardio: regular rate and rhythm, no murmurs, rubs or gallops.   Resp: CTAB no w/r/r.   ENMT: No pharyngeal erythema.  R knee: No tenderness to palpation over the knee.  No swelling or redness.  Negative anterior drawer, negative posterior drawer.  Negative Grisel testing.  No joint line tenderness.  Negative varus and valgus stress testing        Assessment and Plan:     1. Healthcare maintenance  - see HPI.   - discussed diet and exercise.  - Lipid Profile; Future  - Comp Metabolic Panel; Future  - PROSTATE SPECIFIC AG SCREENING; Future    2. Acute pain of right knee  Suspect patellofemoral pain.   - if no resolution within 4 weeks, patient will get XR.   - DX-KNEE COMPLETE 4+ RIGHT; Future    3. Other hemorrhoids  - f/u GI if needed.   - discussed diet.     4. Dyslipidemia  - continue statin.     5. Corn  - stop use of Salicyclic acid in 1 week then use corn pads.        Followup: Return in about 1 year (around 7/1/2020), or if symptoms worsen or fail to improve, for Wellness Visit, Long.     "

## 2019-07-01 NOTE — ASSESSMENT & PLAN NOTE
Patient states that he was doing leg curls at the gym 2 weeks ago when he subsequently developed a 1 out of 10 severity right knee pain located anterior laterally.  He states the pain has progressively gotten better.  He has not really tried anything for it beyond reducing some of the weights he uses at the gym in the short-term.

## 2019-07-01 NOTE — ASSESSMENT & PLAN NOTE
Patient has hemorrhoids that he is controlling conservatively.  If it gets bad, he will go back to GI.

## 2019-07-01 NOTE — ASSESSMENT & PLAN NOTE
Lipids: ordered.  Fasting Glucose: ordered.  Hepatitis C Screen: 2018 was normal.  Colonoscopy: done 1/2019 with 3 year recall.   PSA: ordered.    Pneumonia vaccine: Prevnar given 2017, Pneumovax given 2018.   Tdap: given 2018.    Shingrix: recommended, patient will get at pharmacy.

## 2019-07-03 ENCOUNTER — HOSPITAL ENCOUNTER (OUTPATIENT)
Dept: LAB | Facility: MEDICAL CENTER | Age: 69
End: 2019-07-03
Attending: FAMILY MEDICINE
Payer: COMMERCIAL

## 2019-07-03 DIAGNOSIS — Z00.00 HEALTHCARE MAINTENANCE: ICD-10-CM

## 2019-07-03 LAB
ALBUMIN SERPL BCP-MCNC: 4.5 G/DL (ref 3.2–4.9)
ALBUMIN/GLOB SERPL: 1.7 G/DL
ALP SERPL-CCNC: 53 U/L (ref 30–99)
ALT SERPL-CCNC: 19 U/L (ref 2–50)
ANION GAP SERPL CALC-SCNC: 10 MMOL/L (ref 0–11.9)
AST SERPL-CCNC: 22 U/L (ref 12–45)
BILIRUB SERPL-MCNC: 1 MG/DL (ref 0.1–1.5)
BUN SERPL-MCNC: 15 MG/DL (ref 8–22)
CALCIUM SERPL-MCNC: 9.4 MG/DL (ref 8.5–10.5)
CHLORIDE SERPL-SCNC: 106 MMOL/L (ref 96–112)
CHOLEST SERPL-MCNC: 167 MG/DL (ref 100–199)
CO2 SERPL-SCNC: 25 MMOL/L (ref 20–33)
CREAT SERPL-MCNC: 0.81 MG/DL (ref 0.5–1.4)
FASTING STATUS PATIENT QL REPORTED: NORMAL
GLOBULIN SER CALC-MCNC: 2.6 G/DL (ref 1.9–3.5)
GLUCOSE SERPL-MCNC: 88 MG/DL (ref 65–99)
HDLC SERPL-MCNC: 56 MG/DL
LDLC SERPL CALC-MCNC: 97 MG/DL
POTASSIUM SERPL-SCNC: 3.8 MMOL/L (ref 3.6–5.5)
PROT SERPL-MCNC: 7.1 G/DL (ref 6–8.2)
PSA SERPL-MCNC: 4.63 NG/ML (ref 0–4)
SODIUM SERPL-SCNC: 141 MMOL/L (ref 135–145)
TRIGL SERPL-MCNC: 69 MG/DL (ref 0–149)

## 2019-07-03 PROCEDURE — 36415 COLL VENOUS BLD VENIPUNCTURE: CPT

## 2019-07-03 PROCEDURE — 84153 ASSAY OF PSA TOTAL: CPT

## 2019-07-03 PROCEDURE — 80061 LIPID PANEL: CPT

## 2019-07-03 PROCEDURE — 80053 COMPREHEN METABOLIC PANEL: CPT

## 2019-07-04 ENCOUNTER — PATIENT MESSAGE (OUTPATIENT)
Dept: MEDICAL GROUP | Facility: MEDICAL CENTER | Age: 69
End: 2019-07-04

## 2019-07-05 DIAGNOSIS — R97.20 ELEVATED PSA: ICD-10-CM

## 2019-07-23 ENCOUNTER — HOSPITAL ENCOUNTER (OUTPATIENT)
Dept: LAB | Facility: MEDICAL CENTER | Age: 69
End: 2019-07-23
Attending: FAMILY MEDICINE
Payer: COMMERCIAL

## 2019-07-23 DIAGNOSIS — R97.20 ELEVATED PSA: ICD-10-CM

## 2019-07-23 LAB — PSA SERPL-MCNC: 3.55 NG/ML (ref 0–4)

## 2019-07-23 PROCEDURE — 84153 ASSAY OF PSA TOTAL: CPT

## 2019-07-23 PROCEDURE — 36415 COLL VENOUS BLD VENIPUNCTURE: CPT

## 2019-07-24 DIAGNOSIS — Z12.5 SCREENING FOR PROSTATE CANCER: ICD-10-CM

## 2020-01-28 ENCOUNTER — OFFICE VISIT (OUTPATIENT)
Dept: MEDICAL GROUP | Facility: MEDICAL CENTER | Age: 70
End: 2020-01-28
Payer: COMMERCIAL

## 2020-01-28 VITALS
RESPIRATION RATE: 20 BRPM | SYSTOLIC BLOOD PRESSURE: 138 MMHG | HEART RATE: 78 BPM | HEIGHT: 70 IN | TEMPERATURE: 97.4 F | BODY MASS INDEX: 29.35 KG/M2 | WEIGHT: 205.03 LBS | DIASTOLIC BLOOD PRESSURE: 80 MMHG | OXYGEN SATURATION: 97 %

## 2020-01-28 DIAGNOSIS — R52 BODY ACHES: ICD-10-CM

## 2020-01-28 PROCEDURE — 99213 OFFICE O/P EST LOW 20 MIN: CPT | Performed by: PHYSICIAN ASSISTANT

## 2020-01-28 SDOH — HEALTH STABILITY: MENTAL HEALTH: HOW MANY STANDARD DRINKS CONTAINING ALCOHOL DO YOU HAVE ON A TYPICAL DAY?: 1 OR 2

## 2020-01-28 SDOH — HEALTH STABILITY: MENTAL HEALTH: HOW OFTEN DO YOU HAVE 6 OR MORE DRINKS ON ONE OCCASION?: NEVER

## 2020-01-28 SDOH — HEALTH STABILITY: MENTAL HEALTH: HOW OFTEN DO YOU HAVE A DRINK CONTAINING ALCOHOL?: MONTHLY OR LESS

## 2020-01-28 ASSESSMENT — PATIENT HEALTH QUESTIONNAIRE - PHQ9: CLINICAL INTERPRETATION OF PHQ2 SCORE: 0

## 2020-01-28 NOTE — LETTER
January 28, 2020         Patient: Heber Jacobs   YOB: 1950   Date of Visit: 1/28/2020           To Whom it May Concern:    Heber Jacobs was seen in my clinic on 1/28/2020. He may return to work on 1/30/20.    If you have any questions or concerns, please don't hesitate to call.        Sincerely,           Elizabeth Archuleta P.A.-C.  Electronically Signed

## 2020-01-28 NOTE — PROGRESS NOTES
"Chief Complaint   Patient presents with   • Allergic Reaction     to shingrix patient recieved at the pharmacy       HPI  Heber Jacobs is a 70 y.o. male here today for new onset body aches and fatigue that started yesterday following getting shingles shot.  Patient had a shingles shot yesterday and 11 hrs later at night started to have bodyaches and pain, hands and feet and nose feeling cold. No no fevers, no nausea vomiting or abdominal pain.  No cough or difficulty breathing.  States he has had a similar reaction to swine flu years ago.  Has tried 1 ibuprofen this morning which has helped improve his symptoms.      Past medical, surgical, family, and social history is reviewed in Epic chart by me today.   Medications and allergies reviewed and updated in Epic chart by me today.     ROS:   As documented in history of present illness above    Exam:  /80 (BP Location: Right arm, Patient Position: Sitting, BP Cuff Size: Adult)   Pulse 78   Temp 36.3 °C (97.4 °F) (Temporal)   Resp 20   Ht 1.778 m (5' 10\")   Wt 93 kg (205 lb 0.4 oz)   SpO2 97%   Constitutional: Alert, no distress, plus 3 vital signs  Skin:  Warm, dry, no rashes invisible areas  Eye: Equal, round and reactive, conjunctiva clear  ENMT: Lips without lesions, good dentition, oropharynx clear    Neck: Trachea midline, no masses, no thyromegaly  Respiratory: Unlabored respiratory effort, lungs clear to auscultation, no wheezes, no rhonchi  Cardiovascular: RRR, no murmur,  Psych: Alert, pleasant, well-groomed, normal affect    A/P:    1.  Body aches  Patient is afebrile,  stable blood pressure,  Advised patient symptoms are most likely self-limiting,  Follow-up if symptoms persists after 2 to 3 days.    Patient is a  for school and wants a letter to be off today and tomorrow    F/u prn   "

## 2020-03-02 DIAGNOSIS — E78.5 DYSLIPIDEMIA: ICD-10-CM

## 2020-03-02 DIAGNOSIS — Z00.00 HEALTHCARE MAINTENANCE: ICD-10-CM

## 2020-03-02 RX ORDER — SIMVASTATIN 20 MG
20 TABLET ORAL EVERY EVENING
Qty: 90 TAB | Refills: 3 | Status: SHIPPED | OUTPATIENT
Start: 2020-03-02 | End: 2021-04-05 | Stop reason: SDUPTHER

## 2020-03-20 PROBLEM — F41.9 ANXIETY: Status: ACTIVE | Noted: 2020-03-20

## 2020-04-16 DIAGNOSIS — F41.9 ANXIETY: ICD-10-CM

## 2020-04-16 RX ORDER — SERTRALINE HYDROCHLORIDE 25 MG/1
25 TABLET, FILM COATED ORAL DAILY
Qty: 90 TAB | Refills: 0 | Status: SHIPPED | OUTPATIENT
Start: 2020-04-16 | End: 2021-07-07

## 2020-06-17 DIAGNOSIS — K22.719 BARRETT'S ESOPHAGUS WITH DYSPLASIA: ICD-10-CM

## 2020-06-18 RX ORDER — OMEPRAZOLE 20 MG/1
20 CAPSULE, DELAYED RELEASE ORAL DAILY
Qty: 90 CAP | Refills: 3 | Status: SHIPPED | OUTPATIENT
Start: 2020-06-18 | End: 2021-07-06 | Stop reason: SDUPTHER

## 2020-07-06 ENCOUNTER — OFFICE VISIT (OUTPATIENT)
Dept: MEDICAL GROUP | Facility: MEDICAL CENTER | Age: 70
End: 2020-07-06
Payer: COMMERCIAL

## 2020-07-06 VITALS
BODY MASS INDEX: 29.12 KG/M2 | WEIGHT: 203.4 LBS | HEIGHT: 70 IN | DIASTOLIC BLOOD PRESSURE: 74 MMHG | HEART RATE: 69 BPM | OXYGEN SATURATION: 96 % | TEMPERATURE: 96.9 F | RESPIRATION RATE: 20 BRPM | SYSTOLIC BLOOD PRESSURE: 132 MMHG

## 2020-07-06 DIAGNOSIS — M54.2 NECK PAIN: ICD-10-CM

## 2020-07-06 DIAGNOSIS — F41.9 ANXIETY: ICD-10-CM

## 2020-07-06 DIAGNOSIS — Z00.00 HEALTHCARE MAINTENANCE: ICD-10-CM

## 2020-07-06 DIAGNOSIS — Z87.898 HISTORY OF FEVER: ICD-10-CM

## 2020-07-06 PROCEDURE — 99397 PER PM REEVAL EST PAT 65+ YR: CPT | Performed by: FAMILY MEDICINE

## 2020-07-06 ASSESSMENT — PATIENT HEALTH QUESTIONNAIRE - PHQ9
SUM OF ALL RESPONSES TO PHQ QUESTIONS 1-9: 0
CLINICAL INTERPRETATION OF PHQ2 SCORE: 0

## 2020-07-06 ASSESSMENT — ACTIVITIES OF DAILY LIVING (ADL): BATHING_REQUIRES_ASSISTANCE: 0

## 2020-07-06 ASSESSMENT — ENCOUNTER SYMPTOMS: GENERAL WELL-BEING: EXCELLENT

## 2020-07-06 NOTE — PROGRESS NOTES
Chief Complaint   Patient presents with   • Annual Exam         HPI:  Bon is a 70 y.o. here for Medicare Annual Wellness Visit    Neck pain  Four weeks ago the patient developed some left sided neck pain that was atraumatic and tended to respond to massage. He is currently 70% better and denies sharp/shooting pain down either arm. He is considering going to the chiropractor.     Healthcare maintenance  Lipids: ordered.  Fasting Glucose: ordered.  Hepatitis C Screen: 2018 was normal.  Colonoscopy: done 1/2019 with 3 year recall.   PSA: ordered.    Pneumonia vaccine: Prevnar given 2017, Pneumovax given 2018.   Tdap: given 2018.      Anxiety  This has resolved.      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 90 Cap 3   • sertraline (ZOLOFT) 25 MG tablet Take 1 Tab by mouth every day. 90 Tab 0   • simvastatin (ZOCOR) 20 MG Tab Take 1 Tab by mouth every evening. 90 Tab 3   • POTASSIUM BICARBONATE PO Take  by mouth.     • sildenafil citrate (VIAGRA) 50 MG tablet Take 1-2 Tabs by mouth 1 time daily as needed for Erectile Dysfunction. 30 Tab 6   • Fiber Powder Take 1 Each by mouth 2 Times a Day.     • aspirin (ASA) 81 MG Chew Tab chewable tablet Take 1 Tab by mouth every day.  100 Tab 0   • therapeutic multivitamin-minerals (THERAGRAN-M) Tab Take 1 Tab by mouth every day. 30 Tab 11   • Omega-3 Fatty Acids (FISH OIL) 1200 MG Cap Take  by mouth.       No current facility-administered medications for this visit.         Patient is taking medications as noted in medication list.  Current supplements as per medication list.     Allergies: Patient has no known allergies.    Current social contact/activities: Pt. Enjoys gathering with family and friends. Participates in a group where they go hiking.     Is patient current with immunizations? No, due for SHINGRIX (Shingles). Patient is interested in receiving NONE today.    He  reports that he has never smoked. He has never used smokeless tobacco. He reports current alcohol use. He reports that he does not use drugs.  Counseling given: Not Answered        DPA/Advanced directive: Patient has Living Will, but it is not on file. Instructed to bring in a copy to scan into their chart.    ROS:    Gait: Uses no assistive device   Ostomy: No   Other tubes: No  Amputations: No   Chronic oxygen use No   Last eye exam Jan 2020   Wears hearing aids: No   : Denies any urinary leakage during the last 6 months        Depression Screening    Little interest or pleasure in doing things?  0 - not at all  Feeling down, depressed, or hopeless? 0 - not at all  Patient Health Questionnaire Score: 0    If depressive symptoms identified deferred to follow up visit unless specifically addressed in assessment and plan.    Interpretation of PHQ-9 Total Score   Score Severity   1-4 No Depression   5-9 Mild Depression   10-14 Moderate Depression   15-19 Moderately Severe Depression   20-27 Severe Depression    Screening for Cognitive Impairment    Three Minute Recall (river, nation, finger)  3/3    Khanh clock face with all 12 numbers and set the hands to show 10 past 11.  Yes 5/5  If cognitive concerns identified, deferred for follow up unless specifically addressed in  assessment and plan.    Fall Risk Assessment    Has the patient had two or more falls in the last year or any fall with injury in the last year?  No  If fall risk identified, deferred for follow up unless specifically addressed in assessment and plan.    Safety Assessment    Throw rugs on floor.  Yes  Handrails on all stairs.  Yes  Good lighting in all hallways.  Yes  Difficulty hearing.  No  Patient counseled about all safety risks that were identified.    Functional Assessment ADLs    Are there any barriers preventing you from cooking for yourself or meeting nutritional needs?  No.    Are there any barriers preventing you from driving safely or obtaining transportation?  No.    Are there any barriers preventing you from using a telephone or calling for help?  No.    Are there any barriers preventing you from shopping?  No.    Are there any barriers preventing you from taking care of your own finances?  No.    Are there any barriers preventing you from managing your medications?  No.    Are there any barriers preventing you from showering, bathing or dressing yourself?  No.    Are you currently engaging in any exercise or physical activity?  Yes.  Pt. Goes to the gym 3 times a week.  Plays tennis  walks  What is your perception of your health?  Excellent.    Health Maintenance Summary                IMM ZOSTER VACCINES Overdue 3/23/2020      Done 1/27/2020 Imm Admin: Recombinant Zoster Vaccine (RZV) (Shingrix)    IMM INFLUENZA Next Due 9/1/2020      Done 10/29/2019 Imm Admin: Influenza Vaccine Adult HD     Patient has more history with this topic...    COLONOSCOPY Next Due 1/25/2022      Done 1/25/2019 REFERRAL TO GI FOR COLONOSCOPY     Patient has more history with this topic...    IMM DTaP/Tdap/Td Vaccine Next Due 10/23/2028      Done 10/23/2018 Imm Admin: Tdap Vaccine          Patient Care Team:  Levi Akers M.D. as PCP - General (Family Medicine)    Social History     Tobacco Use   • Smoking status:  "Never Smoker   • Smokeless tobacco: Never Used   Substance Use Topics   • Alcohol use: Yes     Alcohol/week: 0.0 oz     Frequency: Monthly or less     Drinks per session: 1 or 2     Binge frequency: Never     Comment: Occasional   • Drug use: No     Family History   Problem Relation Age of Onset   • Cancer Mother         breast cancer   • Heart Disease Father    • Cancer Brother         leukemia     He  has a past medical history of Downey esophagus, GERD (gastroesophageal reflux disease), Hemorrhoid, Hyperlipidemia, and Nocturia.   Past Surgical History:   Procedure Laterality Date   • KNEE ARTHROSCOPY  9/ 2013    right knee meniscus tear   • EXPLORATORY LAPAROTOMY             Exam:     /74 (BP Location: Left arm, Patient Position: Sitting, BP Cuff Size: Large adult)   Pulse 69   Temp 36.1 °C (96.9 °F) (Temporal)   Resp 20   Ht 1.778 m (5' 10\")   Wt 92.3 kg (203 lb 6.4 oz)   SpO2 96%  Body mass index is 29.18 kg/m².    Hearing good.    Dentition good  Alert, oriented in no acute distress.  Eye contact is good, speech goal directed, affect calm  Cardio: RRR no m/r/g.   Resp: CTAB no w/r/r.   MSK: no TTP over cervical spine, negative Spurlings bilaterally.     Assessment and Plan. The following treatment and monitoring plan is recommended:      1. Healthcare maintenance  - see HPI.   -Age-appropriate anticipatory guidance discussed including diet and exercise.  - Comp Metabolic Panel; Future  - PROSTATE SPECIFIC AG SCREENING; Future    2. History of fever  I informed the patient that the COVID antibody test is not FDA approved and we are uncertain as to its accuracy. Additionally, we do not know how much it will cost and if it will be covered by insurance.   - SARS CoV-2 Ab, Total; Future    3. Neck pain  Recommended some yoga at home.  If this does not resolve, the patient can try chiropractic but I advised him against high velocity manipulation.    4. Anxiety  -Discussed tapering the Zoloft over 2 " weeks to discontinuation.  Patient will let me know if he has any issues.      Services suggested: No services needed at this time  Health Care Screening recommendations as per orders if indicated.  Referrals offered: PT/OT/Nutrition counseling/Behavioral Health/Smoking cessation as per orders if indicated.    Discussion today about general wellness and lifestyle habits:    · Prevent falls and reduce trip hazards; Cautioned about securing or removing rugs.  · Have a working fire alarm and carbon monoxide detector;   · Engage in regular physical activity and social activities       Follow-up: Return in about 1 year (around 7/6/2021), or if symptoms worsen or fail to improve, for Wellness Visit, Long.

## 2020-07-06 NOTE — ASSESSMENT & PLAN NOTE
Four weeks ago the patient developed some left sided neck pain that was atraumatic and tended to respond to massage. He is currently 70% better and denies sharp/shooting pain down either arm. He is considering going to the chiropractor.

## 2020-07-06 NOTE — ASSESSMENT & PLAN NOTE
Lipids: ordered.  Fasting Glucose: ordered.  Hepatitis C Screen: 2018 was normal.  Colonoscopy: done 1/2019 with 3 year recall.   PSA: ordered.    Pneumonia vaccine: Prevnar given 2017, Pneumovax given 2018.   Tdap: given 2018.

## 2020-07-07 ENCOUNTER — PATIENT MESSAGE (OUTPATIENT)
Dept: MEDICAL GROUP | Facility: MEDICAL CENTER | Age: 70
End: 2020-07-07

## 2020-07-07 DIAGNOSIS — Z87.898 HISTORY OF FEVER: ICD-10-CM

## 2020-07-14 ENCOUNTER — HOSPITAL ENCOUNTER (OUTPATIENT)
Dept: LAB | Facility: MEDICAL CENTER | Age: 70
End: 2020-07-14
Attending: FAMILY MEDICINE
Payer: COMMERCIAL

## 2020-07-14 DIAGNOSIS — Z87.898 HISTORY OF FEVER: ICD-10-CM

## 2020-07-14 DIAGNOSIS — Z00.00 HEALTHCARE MAINTENANCE: ICD-10-CM

## 2020-07-14 LAB
ALBUMIN SERPL BCP-MCNC: 4.5 G/DL (ref 3.2–4.9)
ALBUMIN/GLOB SERPL: 2.1 G/DL
ALP SERPL-CCNC: 62 U/L (ref 30–99)
ALT SERPL-CCNC: 24 U/L (ref 2–50)
ANION GAP SERPL CALC-SCNC: 13 MMOL/L (ref 7–16)
AST SERPL-CCNC: 21 U/L (ref 12–45)
BILIRUB SERPL-MCNC: 0.8 MG/DL (ref 0.1–1.5)
BUN SERPL-MCNC: 15 MG/DL (ref 8–22)
CALCIUM SERPL-MCNC: 9 MG/DL (ref 8.5–10.5)
CHLORIDE SERPL-SCNC: 106 MMOL/L (ref 96–112)
CO2 SERPL-SCNC: 20 MMOL/L (ref 20–33)
CREAT SERPL-MCNC: 0.78 MG/DL (ref 0.5–1.4)
FASTING STATUS PATIENT QL REPORTED: NORMAL
GLOBULIN SER CALC-MCNC: 2.1 G/DL (ref 1.9–3.5)
GLUCOSE SERPL-MCNC: 79 MG/DL (ref 65–99)
POTASSIUM SERPL-SCNC: 3.8 MMOL/L (ref 3.6–5.5)
PROT SERPL-MCNC: 6.6 G/DL (ref 6–8.2)
PSA SERPL-MCNC: 3.44 NG/ML (ref 0–4)
SARS-COV-2 AB SERPL QL IA: NORMAL
SODIUM SERPL-SCNC: 139 MMOL/L (ref 135–145)

## 2020-07-14 PROCEDURE — 84153 ASSAY OF PSA TOTAL: CPT

## 2020-07-14 PROCEDURE — 80053 COMPREHEN METABOLIC PANEL: CPT

## 2020-07-14 PROCEDURE — 86769 SARS-COV-2 COVID-19 ANTIBODY: CPT

## 2020-07-14 PROCEDURE — 36415 COLL VENOUS BLD VENIPUNCTURE: CPT

## 2020-09-21 DIAGNOSIS — N52.9 ERECTILE DYSFUNCTION, UNSPECIFIED ERECTILE DYSFUNCTION TYPE: ICD-10-CM

## 2020-09-21 RX ORDER — SILDENAFIL 50 MG/1
50-100 TABLET, FILM COATED ORAL
Qty: 30 TAB | Refills: 6 | Status: SHIPPED
Start: 2020-09-21 | End: 2021-07-07

## 2020-09-21 NOTE — TELEPHONE ENCOUNTER
Received request via: Pharmacy    Was the patient seen in the last year in this department? Yes    Does the patient have an active prescription (recently filled or refills available) for medication(s) requested? No     Future Appointments       Provider Department Center    7/7/2021 10:00 AM Levi Akers M.D. [facility]

## 2020-09-28 DIAGNOSIS — F41.9 ANXIETY: ICD-10-CM

## 2020-09-28 RX ORDER — HYDROXYZINE HYDROCHLORIDE 25 MG/1
25 TABLET, FILM COATED ORAL NIGHTLY PRN
Qty: 20 TAB | Refills: 0 | Status: SHIPPED | OUTPATIENT
Start: 2020-09-28 | End: 2021-07-07

## 2021-03-03 DIAGNOSIS — Z23 NEED FOR VACCINATION: ICD-10-CM

## 2021-04-05 DIAGNOSIS — Z00.00 HEALTHCARE MAINTENANCE: ICD-10-CM

## 2021-04-05 DIAGNOSIS — E78.5 DYSLIPIDEMIA: ICD-10-CM

## 2021-04-05 RX ORDER — SIMVASTATIN 20 MG
20 TABLET ORAL EVERY EVENING
Qty: 100 TABLET | Refills: 4 | Status: SHIPPED | OUTPATIENT
Start: 2021-04-05 | End: 2022-04-25

## 2021-07-06 DIAGNOSIS — K22.719 BARRETT'S ESOPHAGUS WITH DYSPLASIA: ICD-10-CM

## 2021-07-06 RX ORDER — OMEPRAZOLE 20 MG/1
20 CAPSULE, DELAYED RELEASE ORAL DAILY
Qty: 90 CAPSULE | Refills: 3 | Status: SHIPPED | OUTPATIENT
Start: 2021-07-06 | End: 2022-07-20

## 2021-07-06 SDOH — ECONOMIC STABILITY: TRANSPORTATION INSECURITY
IN THE PAST 12 MONTHS, HAS LACK OF RELIABLE TRANSPORTATION KEPT YOU FROM MEDICAL APPOINTMENTS, MEETINGS, WORK OR FROM GETTING THINGS NEEDED FOR DAILY LIVING?: NO

## 2021-07-06 SDOH — ECONOMIC STABILITY: HOUSING INSECURITY: IN THE LAST 12 MONTHS, HOW MANY PLACES HAVE YOU LIVED?: 1

## 2021-07-06 SDOH — ECONOMIC STABILITY: HOUSING INSECURITY
IN THE LAST 12 MONTHS, WAS THERE A TIME WHEN YOU DID NOT HAVE A STEADY PLACE TO SLEEP OR SLEPT IN A SHELTER (INCLUDING NOW)?: NO

## 2021-07-06 SDOH — ECONOMIC STABILITY: INCOME INSECURITY: IN THE LAST 12 MONTHS, WAS THERE A TIME WHEN YOU WERE NOT ABLE TO PAY THE MORTGAGE OR RENT ON TIME?: NO

## 2021-07-06 SDOH — ECONOMIC STABILITY: TRANSPORTATION INSECURITY
IN THE PAST 12 MONTHS, HAS THE LACK OF TRANSPORTATION KEPT YOU FROM MEDICAL APPOINTMENTS OR FROM GETTING MEDICATIONS?: NO

## 2021-07-06 SDOH — ECONOMIC STABILITY: TRANSPORTATION INSECURITY
IN THE PAST 12 MONTHS, HAS LACK OF TRANSPORTATION KEPT YOU FROM MEETINGS, WORK, OR FROM GETTING THINGS NEEDED FOR DAILY LIVING?: NO

## 2021-07-06 SDOH — ECONOMIC STABILITY: FOOD INSECURITY: WITHIN THE PAST 12 MONTHS, YOU WORRIED THAT YOUR FOOD WOULD RUN OUT BEFORE YOU GOT MONEY TO BUY MORE.: NEVER TRUE

## 2021-07-06 SDOH — HEALTH STABILITY: PHYSICAL HEALTH: ON AVERAGE, HOW MANY MINUTES DO YOU ENGAGE IN EXERCISE AT THIS LEVEL?: 90 MIN

## 2021-07-06 SDOH — HEALTH STABILITY: PHYSICAL HEALTH: ON AVERAGE, HOW MANY DAYS PER WEEK DO YOU ENGAGE IN MODERATE TO STRENUOUS EXERCISE (LIKE A BRISK WALK)?: 3 DAYS

## 2021-07-06 SDOH — ECONOMIC STABILITY: FOOD INSECURITY: WITHIN THE PAST 12 MONTHS, THE FOOD YOU BOUGHT JUST DIDN'T LAST AND YOU DIDN'T HAVE MONEY TO GET MORE.: NEVER TRUE

## 2021-07-06 SDOH — ECONOMIC STABILITY: INCOME INSECURITY: HOW HARD IS IT FOR YOU TO PAY FOR THE VERY BASICS LIKE FOOD, HOUSING, MEDICAL CARE, AND HEATING?: NOT HARD AT ALL

## 2021-07-06 SDOH — HEALTH STABILITY: MENTAL HEALTH
STRESS IS WHEN SOMEONE FEELS TENSE, NERVOUS, ANXIOUS, OR CAN'T SLEEP AT NIGHT BECAUSE THEIR MIND IS TROUBLED. HOW STRESSED ARE YOU?: NOT AT ALL

## 2021-07-06 ASSESSMENT — SOCIAL DETERMINANTS OF HEALTH (SDOH)
HOW MANY DRINKS CONTAINING ALCOHOL DO YOU HAVE ON A TYPICAL DAY WHEN YOU ARE DRINKING: 1 OR 2
HOW OFTEN DO YOU HAVE A DRINK CONTAINING ALCOHOL: MONTHLY OR LESS
HOW OFTEN DO YOU ATTEND CHURCH OR RELIGIOUS SERVICES?: NEVER
HOW OFTEN DO YOU GET TOGETHER WITH FRIENDS OR RELATIVES?: ONCE A WEEK
HOW OFTEN DO YOU ATTENT MEETINGS OF THE CLUB OR ORGANIZATION YOU BELONG TO?: 1 TO 4 TIMES PER YEAR
HOW OFTEN DO YOU HAVE SIX OR MORE DRINKS ON ONE OCCASION: NEVER
IN A TYPICAL WEEK, HOW MANY TIMES DO YOU TALK ON THE PHONE WITH FAMILY, FRIENDS, OR NEIGHBORS?: THREE TIMES A WEEK
ARE YOU MARRIED, WIDOWED, DIVORCED, SEPARATED, NEVER MARRIED, OR LIVING WITH A PARTNER?: NEVER MARRIED
IN A TYPICAL WEEK, HOW MANY TIMES DO YOU TALK ON THE PHONE WITH FAMILY, FRIENDS, OR NEIGHBORS?: THREE TIMES A WEEK
HOW OFTEN DO YOU GET TOGETHER WITH FRIENDS OR RELATIVES?: ONCE A WEEK
HOW HARD IS IT FOR YOU TO PAY FOR THE VERY BASICS LIKE FOOD, HOUSING, MEDICAL CARE, AND HEATING?: NOT HARD AT ALL
ARE YOU MARRIED, WIDOWED, DIVORCED, SEPARATED, NEVER MARRIED, OR LIVING WITH A PARTNER?: NEVER MARRIED
HOW OFTEN DO YOU ATTEND CHURCH OR RELIGIOUS SERVICES?: NEVER
WITHIN THE PAST 12 MONTHS, YOU WORRIED THAT YOUR FOOD WOULD RUN OUT BEFORE YOU GOT THE MONEY TO BUY MORE: NEVER TRUE
HOW OFTEN DO YOU ATTENT MEETINGS OF THE CLUB OR ORGANIZATION YOU BELONG TO?: 1 TO 4 TIMES PER YEAR

## 2021-07-06 ASSESSMENT — LIFESTYLE VARIABLES
HOW MANY STANDARD DRINKS CONTAINING ALCOHOL DO YOU HAVE ON A TYPICAL DAY: 1 OR 2
HOW OFTEN DO YOU HAVE A DRINK CONTAINING ALCOHOL: MONTHLY OR LESS
HOW OFTEN DO YOU HAVE SIX OR MORE DRINKS ON ONE OCCASION: NEVER

## 2021-07-07 ENCOUNTER — OFFICE VISIT (OUTPATIENT)
Dept: MEDICAL GROUP | Facility: MEDICAL CENTER | Age: 71
End: 2021-07-07
Payer: COMMERCIAL

## 2021-07-07 VITALS
OXYGEN SATURATION: 96 % | HEART RATE: 72 BPM | TEMPERATURE: 97.2 F | HEIGHT: 69 IN | DIASTOLIC BLOOD PRESSURE: 72 MMHG | WEIGHT: 203.2 LBS | BODY MASS INDEX: 30.1 KG/M2 | SYSTOLIC BLOOD PRESSURE: 130 MMHG | RESPIRATION RATE: 18 BRPM

## 2021-07-07 DIAGNOSIS — M54.2 NECK PAIN: ICD-10-CM

## 2021-07-07 DIAGNOSIS — Z00.00 HEALTHCARE MAINTENANCE: ICD-10-CM

## 2021-07-07 PROCEDURE — 99397 PER PM REEVAL EST PAT 65+ YR: CPT | Performed by: FAMILY MEDICINE

## 2021-07-07 RX ORDER — LORATADINE 10 MG/1
10 TABLET ORAL DAILY
COMMUNITY
Start: 2020-03-15 | End: 2021-11-04

## 2021-07-07 ASSESSMENT — ENCOUNTER SYMPTOMS: GENERAL WELL-BEING: EXCELLENT

## 2021-07-07 ASSESSMENT — PATIENT HEALTH QUESTIONNAIRE - PHQ9: CLINICAL INTERPRETATION OF PHQ2 SCORE: 0

## 2021-07-07 ASSESSMENT — ACTIVITIES OF DAILY LIVING (ADL): BATHING_REQUIRES_ASSISTANCE: 0

## 2021-07-07 NOTE — PROGRESS NOTES
Chief Complaint   Patient presents with   • Annual Exam   • Skin Lesion     Rt. hand index finger bump; noticed in March         HPI:  Bon is a 71 y.o. here for Medicare Annual Wellness Visit    Healthcare maintenance  Lipids: ordered.  Fasting Glucose: ordered.  Hepatitis C Screen: 2018 was normal.  Colonoscopy: done 1/2019 with 3 year recall.   PSA: ordered.    Pneumonia vaccine: Prevnar given 2017, Pneumovax given 2018.   Tdap: given 2018.  Lipids: ordered.  Fasting Glucose: ordered.  Hepatitis C Screen: 2018 was normal.  Colonoscopy: done 1/2019 with 3 year recall.   PSA: ordered.    Pneumonia vaccine: Prevnar given 2017, Pneumovax given 2018.   Tdap: given 2018.    Shingrix: given 2020.   COVID vaccine: given 2021.     Neck pain  Mild, interested in acupuncture.      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     • Olopatadine HCl (PATADAY OP) Administer 1 Drop into affected eye(s) every day.     • omeprazole (PRILOSEC) 20 MG delayed-release capsule Take 1 capsule by mouth every day. 90 capsule 3   • simvastatin (ZOCOR) 20 MG Tab Take 1 tablet by mouth every evening. 100 tablet 4   • hyoscyamine (LEVSIN) 0.125 MG SL Tab Take 1 Tab by mouth every 6 hours as needed for Cramping. 30 Tab  1   • POTASSIUM BICARBONATE PO Take  by mouth.     • Fiber Powder Take 1 Each by mouth every day.     • aspirin (ASA) 81 MG Chew Tab chewable tablet Take 1 Tab by mouth every day. 100 Tab 0   • therapeutic multivitamin-minerals (THERAGRAN-M) Tab Take 1 Tab by mouth every day. 30 Tab 11   • Omega-3 Fatty Acids (FISH OIL) 1200 MG Cap Take  by mouth.       No current facility-administered medications for this visit.        Patient is taking medications as noted in medication list.  Current supplements as per medication list.     Allergies: Patient has no known allergies.    Current social contact/activities: Pt stays in touch with family and friends. Pt. Enjoys the outdoors     Is patient current with immunizations? Yes.    He  reports that he has never smoked. He has never used smokeless tobacco. He reports current alcohol use. He reports that he does not use drugs.  Counseling given: Not Answered        DPA/Advanced directive: Patient does not have an Advanced Directive.  A packet and workshop information was given on Advanced Directives.    ROS:    Gait: Uses no assistive device   Ostomy: No   Other tubes: No   Amputations: No   Chronic oxygen use No   Last eye exam 1/2021   Wears hearing aids: No   : Denies any urinary leakage during the last 6 months        Depression Screening    Little interest or pleasure in doing things?  0 - not at all  Feeling down, depressed, or hopeless? 0 - not at all  Patient Health Questionnaire Score: 0    If depressive symptoms identified deferred to follow up visit unless specifically addressed in assessment and plan.    Interpretation of PHQ-9 Total Score   Score Severity   1-4 No Depression   5-9 Mild Depression   10-14 Moderate Depression   15-19 Moderately Severe Depression   20-27 Severe Depression    Screening for Cognitive Impairment    Three Minute Recall (captain, garden, picture)  3/3    Khanh clock face with all 12 numbers and set the hands to show 5 past 8.  Yes 5/5  If  cognitive concerns identified, deferred for follow up unless specifically addressed in assessment and plan.    Fall Risk Assessment    Has the patient had two or more falls in the last year or any fall with injury in the last year?  No  If fall risk identified, deferred for follow up unless specifically addressed in assessment and plan.    Safety Assessment    Throw rugs on floor.  Yes  Handrails on all stairs.  Yes  Good lighting in all hallways.  Yes  Difficulty hearing.  No  Patient counseled about all safety risks that were identified.    Functional Assessment ADLs    Are there any barriers preventing you from cooking for yourself or meeting nutritional needs?  No.    Are there any barriers preventing you from driving safely or obtaining transportation?  No.    Are there any barriers preventing you from using a telephone or calling for help?  No.    Are there any barriers preventing you from shopping?  No.    Are there any barriers preventing you from taking care of your own finances?  No.    Are there any barriers preventing you from managing your medications?  No.    Are there any barriers preventing you from showering, bathing or dressing yourself?  No.    Are you currently engaging in any exercise or physical activity?  Yes.  Pt. Goes to the gym at least twice a week.   Pt. golf's and plays tennis. Pt. Enjoys walking.  Pt. Takes care of his home.   What is your perception of your health?  Excellent.    Health Maintenance Summary                IMM INFLUENZA Next Due 9/1/2021      Done 10/20/2020 Imm Admin: Influenza Vaccine Quad Inj (Pf)     Patient has more history with this topic...    COLONOSCOPY Next Due 1/25/2022      Done 1/25/2019 REFERRAL TO GI FOR COLONOSCOPY     Patient has more history with this topic...    IMM DTaP/Tdap/Td Vaccine Next Due 10/23/2028      Done 10/23/2018 Imm Admin: Tdap Vaccine          Patient Care Team:  Levi Akers M.D. as PCP - General (Family Medicine)    Social  "History     Tobacco Use   • Smoking status: Never Smoker   • Smokeless tobacco: Never Used   Vaping Use   • Vaping Use: Never used   Substance Use Topics   • Alcohol use: Yes     Alcohol/week: 0.0 oz     Comment: Occasional   • Drug use: No     Family History   Problem Relation Age of Onset   • Cancer Mother         breast cancer   • Heart Disease Father    • Cancer Brother         leukemia     He  has a past medical history of Downey esophagus, GERD (gastroesophageal reflux disease), Hemorrhoid, Hyperlipidemia, and Nocturia.   Past Surgical History:   Procedure Laterality Date   • KNEE ARTHROSCOPY  9/ 2013    right knee meniscus tear   • EXPLORATORY LAPAROTOMY             Exam:     /72 (BP Location: Left arm, Patient Position: Sitting, BP Cuff Size: Adult long)   Pulse 72   Temp 36.2 °C (97.2 °F) (Temporal)   Resp 18   Ht 1.759 m (5' 9.25\")   Wt 92.2 kg (203 lb 3.2 oz)   SpO2 96%  Body mass index is 29.79 kg/m².    Hearing good.    Dentition good  Alert, oriented in no acute distress.  Eye contact is good, speech goal directed, affect calm  Skin: Describes a several month swelling over the DIP joints. On examination, this is a Heberden's node and I informed him it is consistent with osteoarthritis.      Assessment and Plan. The following treatment and monitoring plan is recommended:      1. Healthcare maintenance  - see HPI.   -Age-appropriate as per guidance discussed including diet and exercise.  - Comp Metabolic Panel; Future  - Lipid Profile; Future  - PROSTATE SPECIFIC AG SCREENING; Future  - CBC WITH DIFFERENTIAL; Future    2. Neck pain  - REFERRAL FOR ACUPUNCTURE'    Services suggested: No services needed at this time  Health Care Screening recommendations as per orders if indicated.  Referrals offered: PT/OT/Nutrition counseling/Behavioral Health/Smoking cessation as per orders if indicated.    Discussion today about general wellness and lifestyle habits:    · Prevent falls and reduce trip " hazards; Cautioned about securing or removing rugs.  · Have a working fire alarm and carbon monoxide detector;   · Engage in regular physical activity and social activities       Follow-up: Return in about 1 year (around 7/7/2022), or if symptoms worsen or fail to improve, for Wellness Visit, Long.

## 2021-07-07 NOTE — ASSESSMENT & PLAN NOTE
Lipids: ordered.  Fasting Glucose: ordered.  Hepatitis C Screen: 2018 was normal.  Colonoscopy: done 1/2019 with 3 year recall.   PSA: ordered.    Pneumonia vaccine: Prevnar given 2017, Pneumovax given 2018.   Tdap: given 2018.  Lipids: ordered.  Fasting Glucose: ordered.  Hepatitis C Screen: 2018 was normal.  Colonoscopy: done 1/2019 with 3 year recall.   PSA: ordered.    Pneumonia vaccine: Prevnar given 2017, Pneumovax given 2018.   Tdap: given 2018.    Shingrix: given 2020.   COVID vaccine: given 2021.

## 2021-07-14 ENCOUNTER — HOSPITAL ENCOUNTER (OUTPATIENT)
Dept: LAB | Facility: MEDICAL CENTER | Age: 71
End: 2021-07-14
Attending: FAMILY MEDICINE
Payer: COMMERCIAL

## 2021-07-14 DIAGNOSIS — Z00.00 HEALTHCARE MAINTENANCE: ICD-10-CM

## 2021-07-14 LAB
ALBUMIN SERPL BCP-MCNC: 4.4 G/DL (ref 3.2–4.9)
ALBUMIN/GLOB SERPL: 1.9 G/DL
ALP SERPL-CCNC: 61 U/L (ref 30–99)
ALT SERPL-CCNC: 16 U/L (ref 2–50)
ANION GAP SERPL CALC-SCNC: 9 MMOL/L (ref 7–16)
AST SERPL-CCNC: 17 U/L (ref 12–45)
BASOPHILS # BLD AUTO: 0.9 % (ref 0–1.8)
BASOPHILS # BLD: 0.04 K/UL (ref 0–0.12)
BILIRUB SERPL-MCNC: 0.4 MG/DL (ref 0.1–1.5)
BUN SERPL-MCNC: 16 MG/DL (ref 8–22)
CALCIUM SERPL-MCNC: 9.2 MG/DL (ref 8.5–10.5)
CHLORIDE SERPL-SCNC: 110 MMOL/L (ref 96–112)
CHOLEST SERPL-MCNC: 161 MG/DL (ref 100–199)
CO2 SERPL-SCNC: 22 MMOL/L (ref 20–33)
CREAT SERPL-MCNC: 0.88 MG/DL (ref 0.5–1.4)
EOSINOPHIL # BLD AUTO: 0.22 K/UL (ref 0–0.51)
EOSINOPHIL NFR BLD: 5.2 % (ref 0–6.9)
ERYTHROCYTE [DISTWIDTH] IN BLOOD BY AUTOMATED COUNT: 56.4 FL (ref 35.9–50)
FASTING STATUS PATIENT QL REPORTED: NORMAL
GLOBULIN SER CALC-MCNC: 2.3 G/DL (ref 1.9–3.5)
GLUCOSE SERPL-MCNC: 97 MG/DL (ref 65–99)
HCT VFR BLD AUTO: 38.4 % (ref 42–52)
HDLC SERPL-MCNC: 53 MG/DL
HGB BLD-MCNC: 12.1 G/DL (ref 14–18)
IMM GRANULOCYTES # BLD AUTO: 0.01 K/UL (ref 0–0.11)
IMM GRANULOCYTES NFR BLD AUTO: 0.2 % (ref 0–0.9)
LDLC SERPL CALC-MCNC: 93 MG/DL
LYMPHOCYTES # BLD AUTO: 1.37 K/UL (ref 1–4.8)
LYMPHOCYTES NFR BLD: 32.5 % (ref 22–41)
MCH RBC QN AUTO: 28 PG (ref 27–33)
MCHC RBC AUTO-ENTMCNC: 31.5 G/DL (ref 33.7–35.3)
MCV RBC AUTO: 88.9 FL (ref 81.4–97.8)
MONOCYTES # BLD AUTO: 0.46 K/UL (ref 0–0.85)
MONOCYTES NFR BLD AUTO: 10.9 % (ref 0–13.4)
NEUTROPHILS # BLD AUTO: 2.12 K/UL (ref 1.82–7.42)
NEUTROPHILS NFR BLD: 50.3 % (ref 44–72)
NRBC # BLD AUTO: 0 K/UL
NRBC BLD-RTO: 0 /100 WBC
PLATELET # BLD AUTO: 225 K/UL (ref 164–446)
PMV BLD AUTO: 10.2 FL (ref 9–12.9)
POTASSIUM SERPL-SCNC: 4.3 MMOL/L (ref 3.6–5.5)
PROT SERPL-MCNC: 6.7 G/DL (ref 6–8.2)
PSA SERPL-MCNC: 3.12 NG/ML (ref 0–4)
RBC # BLD AUTO: 4.32 M/UL (ref 4.7–6.1)
SODIUM SERPL-SCNC: 141 MMOL/L (ref 135–145)
TRIGL SERPL-MCNC: 74 MG/DL (ref 0–149)
WBC # BLD AUTO: 4.2 K/UL (ref 4.8–10.8)

## 2021-07-14 PROCEDURE — 36415 COLL VENOUS BLD VENIPUNCTURE: CPT

## 2021-07-14 PROCEDURE — 85025 COMPLETE CBC W/AUTO DIFF WBC: CPT

## 2021-07-14 PROCEDURE — 80061 LIPID PANEL: CPT

## 2021-07-14 PROCEDURE — 80053 COMPREHEN METABOLIC PANEL: CPT

## 2021-07-14 PROCEDURE — 84153 ASSAY OF PSA TOTAL: CPT

## 2021-07-15 ENCOUNTER — PATIENT MESSAGE (OUTPATIENT)
Dept: MEDICAL GROUP | Facility: MEDICAL CENTER | Age: 71
End: 2021-07-15

## 2021-07-15 DIAGNOSIS — D64.9 ANEMIA, UNSPECIFIED TYPE: ICD-10-CM

## 2021-07-18 ENCOUNTER — HOSPITAL ENCOUNTER (OUTPATIENT)
Facility: MEDICAL CENTER | Age: 71
End: 2021-07-18
Attending: FAMILY MEDICINE
Payer: COMMERCIAL

## 2021-07-18 PROCEDURE — 82274 ASSAY TEST FOR BLOOD FECAL: CPT

## 2021-07-21 DIAGNOSIS — D64.9 ANEMIA, UNSPECIFIED TYPE: ICD-10-CM

## 2021-07-23 LAB — IMM ASSAY OCC BLD FITOB: NEGATIVE

## 2021-08-26 ENCOUNTER — OFFICE VISIT (OUTPATIENT)
Dept: URGENT CARE | Facility: CLINIC | Age: 71
End: 2021-08-26
Payer: COMMERCIAL

## 2021-08-26 ENCOUNTER — TELEPHONE (OUTPATIENT)
Dept: MEDICAL GROUP | Facility: MEDICAL CENTER | Age: 71
End: 2021-08-26

## 2021-08-26 ENCOUNTER — HOSPITAL ENCOUNTER (OUTPATIENT)
Facility: MEDICAL CENTER | Age: 71
End: 2021-08-26
Attending: PHYSICIAN ASSISTANT
Payer: COMMERCIAL

## 2021-08-26 ENCOUNTER — APPOINTMENT (OUTPATIENT)
Dept: RADIOLOGY | Facility: IMAGING CENTER | Age: 71
End: 2021-08-26
Attending: PHYSICIAN ASSISTANT
Payer: COMMERCIAL

## 2021-08-26 VITALS
TEMPERATURE: 96.8 F | DIASTOLIC BLOOD PRESSURE: 82 MMHG | BODY MASS INDEX: 28.43 KG/M2 | HEIGHT: 70 IN | WEIGHT: 198.6 LBS | SYSTOLIC BLOOD PRESSURE: 134 MMHG | HEART RATE: 74 BPM | OXYGEN SATURATION: 97 % | RESPIRATION RATE: 14 BRPM

## 2021-08-26 DIAGNOSIS — R07.89 CHEST TIGHTNESS: ICD-10-CM

## 2021-08-26 DIAGNOSIS — J39.2 THROAT IRRITATION: ICD-10-CM

## 2021-08-26 PROCEDURE — U0003 INFECTIOUS AGENT DETECTION BY NUCLEIC ACID (DNA OR RNA); SEVERE ACUTE RESPIRATORY SYNDROME CORONAVIRUS 2 (SARS-COV-2) (CORONAVIRUS DISEASE [COVID-19]), AMPLIFIED PROBE TECHNIQUE, MAKING USE OF HIGH THROUGHPUT TECHNOLOGIES AS DESCRIBED BY CMS-2020-01-R: HCPCS

## 2021-08-26 PROCEDURE — 99214 OFFICE O/P EST MOD 30 MIN: CPT | Performed by: PHYSICIAN ASSISTANT

## 2021-08-26 PROCEDURE — U0005 INFEC AGEN DETEC AMPLI PROBE: HCPCS

## 2021-08-26 PROCEDURE — 71046 X-RAY EXAM CHEST 2 VIEWS: CPT | Mod: TC | Performed by: PHYSICIAN ASSISTANT

## 2021-08-26 PROCEDURE — 93000 ELECTROCARDIOGRAM COMPLETE: CPT | Performed by: PHYSICIAN ASSISTANT

## 2021-08-26 RX ORDER — ALBUTEROL SULFATE 90 UG/1
2 AEROSOL, METERED RESPIRATORY (INHALATION) EVERY 6 HOURS PRN
Qty: 8.5 G | Refills: 0 | Status: SHIPPED
Start: 2021-08-26 | End: 2021-11-04

## 2021-08-26 ASSESSMENT — FIBROSIS 4 INDEX: FIB4 SCORE: 1.3

## 2021-08-26 ASSESSMENT — ENCOUNTER SYMPTOMS
VOMITING: 0
CHILLS: 0
PALPITATIONS: 0
COUGH: 0
MYALGIAS: 0
BLURRED VISION: 0
SINUS PAIN: 0
FEVER: 0
HEADACHES: 0
ABDOMINAL PAIN: 0
NAUSEA: 0
DIZZINESS: 0
DIARRHEA: 0
SHORTNESS OF BREATH: 0
SORE THROAT: 1
EYE PAIN: 0

## 2021-08-26 NOTE — LETTER
August 26, 2021         Patient: Heber Jacobs   YOB: 1950   Date of Visit: 8/26/2021           To Whom it May Concern:    Heber Jacobs was seen in my clinic on 8/26/2021. A concern for COVID-19 has been identified and testing is in progress. They will be able to access their results through our electronic delivery system called [employer].     We are asking you to excuse absences while they follow self-isolation protocol per CDC guidelines.   • 10 days since symptoms first appeared and   • 24 hours with no fever without the use of fever-reducing medications and   • Other symptoms of COVID-19 are improving*  *Loss of taste and smell may persist for weeks or months after recovery and need not delay the end of isolation    Your employee may return to work if results are negative.    This is the only note that will be provided from [employer] for this visit. Please schedule a visit with primary care if documents for FMLA, disability, or unemployment are required.     If you have any questions or concerns, please don't hesitate to call.        Sincerely,           Hoa Constantino P.A.-C.  Electronically Signed

## 2021-08-26 NOTE — TELEPHONE ENCOUNTER
1. Caller Name: Heber Jacobs                        Call Back Number: [phone] (home)       How would the patient prefer to be contacted with a response: Phone call do NOT leave a detailed message    Pt called and left a vm stating he was just seen in urgent care, wondering if he should be seen in-person, vv, or cancel his appointment ?  Please advise, thanks.
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
## 2021-08-27 DIAGNOSIS — J39.2 THROAT IRRITATION: ICD-10-CM

## 2021-08-27 DIAGNOSIS — R07.89 CHEST TIGHTNESS: ICD-10-CM

## 2021-08-27 LAB
COVID ORDER STATUS COVID19: NORMAL
SARS-COV-2 RNA RESP QL NAA+PROBE: NOTDETECTED
SPECIMEN SOURCE: NORMAL

## 2021-09-13 ENCOUNTER — APPOINTMENT (OUTPATIENT)
Dept: MEDICAL GROUP | Facility: IMAGING CENTER | Age: 71
End: 2021-09-13

## 2021-09-16 ENCOUNTER — APPOINTMENT (OUTPATIENT)
Dept: MEDICAL GROUP | Facility: IMAGING CENTER | Age: 71
End: 2021-09-16

## 2021-09-18 ENCOUNTER — HOSPITAL ENCOUNTER (OUTPATIENT)
Dept: LAB | Facility: MEDICAL CENTER | Age: 71
End: 2021-09-18
Attending: FAMILY MEDICINE
Payer: COMMERCIAL

## 2021-09-18 DIAGNOSIS — D64.9 ANEMIA, UNSPECIFIED TYPE: ICD-10-CM

## 2021-09-18 LAB
FERRITIN SERPL-MCNC: 8.5 NG/ML (ref 22–322)
HCT VFR BLD AUTO: 39.9 % (ref 42–52)
HGB BLD-MCNC: 12.9 G/DL (ref 14–18)
IRON SATN MFR SERPL: 11 % (ref 15–55)
IRON SERPL-MCNC: 47 UG/DL (ref 50–180)
TIBC SERPL-MCNC: 431 UG/DL (ref 250–450)
UIBC SERPL-MCNC: 384 UG/DL (ref 110–370)

## 2021-09-18 PROCEDURE — 85014 HEMATOCRIT: CPT

## 2021-09-18 PROCEDURE — 83540 ASSAY OF IRON: CPT

## 2021-09-18 PROCEDURE — 36415 COLL VENOUS BLD VENIPUNCTURE: CPT

## 2021-09-18 PROCEDURE — 85018 HEMOGLOBIN: CPT

## 2021-09-18 PROCEDURE — 83550 IRON BINDING TEST: CPT

## 2021-09-18 PROCEDURE — 82728 ASSAY OF FERRITIN: CPT

## 2021-09-20 ENCOUNTER — APPOINTMENT (OUTPATIENT)
Dept: MEDICAL GROUP | Facility: IMAGING CENTER | Age: 71
End: 2021-09-20

## 2021-09-21 DIAGNOSIS — D50.9 IRON DEFICIENCY ANEMIA, UNSPECIFIED IRON DEFICIENCY ANEMIA TYPE: ICD-10-CM

## 2021-09-21 RX ORDER — FERROUS SULFATE 325(65) MG
325 TABLET ORAL DAILY
Qty: 90 TABLET | Refills: 0 | Status: SHIPPED | OUTPATIENT
Start: 2021-09-21 | End: 2021-12-20

## 2021-09-23 ENCOUNTER — APPOINTMENT (OUTPATIENT)
Dept: MEDICAL GROUP | Facility: IMAGING CENTER | Age: 71
End: 2021-09-23

## 2021-10-15 ENCOUNTER — APPOINTMENT (OUTPATIENT)
Dept: MEDICAL GROUP | Facility: IMAGING CENTER | Age: 71
End: 2021-10-15

## 2021-10-18 ENCOUNTER — OFFICE VISIT (OUTPATIENT)
Dept: MEDICAL GROUP | Facility: IMAGING CENTER | Age: 71
End: 2021-10-18

## 2021-10-21 ENCOUNTER — APPOINTMENT (OUTPATIENT)
Dept: RADIOLOGY | Facility: MEDICAL CENTER | Age: 71
End: 2021-10-21
Attending: PHYSICIAN ASSISTANT
Payer: COMMERCIAL

## 2021-10-27 ENCOUNTER — APPOINTMENT (OUTPATIENT)
Dept: RADIOLOGY | Facility: MEDICAL CENTER | Age: 71
End: 2021-10-27
Attending: PHYSICIAN ASSISTANT
Payer: COMMERCIAL

## 2021-10-27 DIAGNOSIS — S66.401A: ICD-10-CM

## 2021-10-27 PROCEDURE — 73218 MRI UPPER EXTREMITY W/O DYE: CPT | Mod: RT

## 2021-11-04 ENCOUNTER — TELEMEDICINE (OUTPATIENT)
Dept: MEDICAL GROUP | Facility: MEDICAL CENTER | Age: 71
End: 2021-11-04
Payer: COMMERCIAL

## 2021-11-04 VITALS — HEART RATE: 68 BPM | HEIGHT: 70 IN | BODY MASS INDEX: 27.49 KG/M2 | WEIGHT: 192 LBS

## 2021-11-04 DIAGNOSIS — D50.9 IRON DEFICIENCY ANEMIA, UNSPECIFIED IRON DEFICIENCY ANEMIA TYPE: ICD-10-CM

## 2021-11-04 DIAGNOSIS — S63.601S: ICD-10-CM

## 2021-11-04 PROBLEM — D64.9 ANEMIA: Status: ACTIVE | Noted: 2021-11-04

## 2021-11-04 PROBLEM — S63.601A SPRAIN OF RIGHT THUMB: Status: ACTIVE | Noted: 2021-11-04

## 2021-11-04 PROCEDURE — 99214 OFFICE O/P EST MOD 30 MIN: CPT | Mod: 95 | Performed by: FAMILY MEDICINE

## 2021-11-04 ASSESSMENT — FIBROSIS 4 INDEX: FIB4 SCORE: 1.3
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
## 2021-11-05 NOTE — ASSESSMENT & PLAN NOTE
Patient has an iron deficiency anemia and is scheduled to see GI.  He has been maintained on iron supplementation.

## 2021-11-17 ENCOUNTER — HOSPITAL ENCOUNTER (OUTPATIENT)
Dept: RADIOLOGY | Facility: MEDICAL CENTER | Age: 71
End: 2021-11-17
Payer: COMMERCIAL

## 2021-11-30 ENCOUNTER — HOSPITAL ENCOUNTER (OUTPATIENT)
Dept: LAB | Facility: MEDICAL CENTER | Age: 71
End: 2021-11-30
Attending: FAMILY MEDICINE
Payer: COMMERCIAL

## 2021-11-30 DIAGNOSIS — D50.9 IRON DEFICIENCY ANEMIA, UNSPECIFIED IRON DEFICIENCY ANEMIA TYPE: ICD-10-CM

## 2021-11-30 LAB
HCT VFR BLD AUTO: 42.9 % (ref 42–52)
HGB BLD-MCNC: 15 G/DL (ref 14–18)

## 2021-11-30 PROCEDURE — 36415 COLL VENOUS BLD VENIPUNCTURE: CPT

## 2021-11-30 PROCEDURE — 85014 HEMATOCRIT: CPT

## 2021-11-30 PROCEDURE — 85018 HEMOGLOBIN: CPT

## 2022-04-25 DIAGNOSIS — Z00.00 HEALTHCARE MAINTENANCE: ICD-10-CM

## 2022-04-25 DIAGNOSIS — E78.5 DYSLIPIDEMIA: ICD-10-CM

## 2022-04-25 RX ORDER — SIMVASTATIN 20 MG
TABLET ORAL
Qty: 90 TABLET | Refills: 4 | Status: SHIPPED | OUTPATIENT
Start: 2022-04-25 | End: 2022-10-21 | Stop reason: SDUPTHER

## 2022-05-02 ENCOUNTER — OFFICE VISIT (OUTPATIENT)
Dept: MEDICAL GROUP | Facility: MEDICAL CENTER | Age: 72
End: 2022-05-02
Payer: COMMERCIAL

## 2022-05-02 VITALS
BODY MASS INDEX: 27.74 KG/M2 | DIASTOLIC BLOOD PRESSURE: 74 MMHG | RESPIRATION RATE: 18 BRPM | HEIGHT: 70 IN | TEMPERATURE: 97.5 F | HEART RATE: 66 BPM | WEIGHT: 193.78 LBS | SYSTOLIC BLOOD PRESSURE: 134 MMHG | OXYGEN SATURATION: 97 %

## 2022-05-02 DIAGNOSIS — M25.561 ACUTE PAIN OF RIGHT KNEE: ICD-10-CM

## 2022-05-02 PROCEDURE — 99214 OFFICE O/P EST MOD 30 MIN: CPT | Performed by: FAMILY MEDICINE

## 2022-05-02 RX ORDER — MELOXICAM 7.5 MG/1
7.5 TABLET ORAL DAILY
Qty: 7 TABLET | Refills: 0 | Status: SHIPPED | OUTPATIENT
Start: 2022-05-02 | End: 2022-05-09

## 2022-05-02 ASSESSMENT — FIBROSIS 4 INDEX: FIB4 SCORE: 1.32

## 2022-05-02 ASSESSMENT — PATIENT HEALTH QUESTIONNAIRE - PHQ9: CLINICAL INTERPRETATION OF PHQ2 SCORE: 0

## 2022-05-02 NOTE — ASSESSMENT & PLAN NOTE
Patient states that the past 6 months he has had increasing stiffness in the right knee.  About 3 weeks ago, he had a massage.  During the massage, his knee was hyperflexed and shortly thereafter he had pronounced stiffness which has improved somewhat.  He denies significant pain.  Of note, in 2013 he had a right knee arthroscopy.

## 2022-05-02 NOTE — PROGRESS NOTES
"[facility]  Progress Note  Established Patient    Subjective:   Heber Jacobs is a 72 y.o. male here today with a chief complaint of knee pain. The patient is alone.     Right knee pain  Patient states that the past 6 months he has had increasing stiffness in the right knee.  About 3 weeks ago, he had a massage.  During the massage, his knee was hyperflexed and shortly thereafter he had pronounced stiffness which has improved somewhat.  He denies significant pain.  Of note, in 2013 he had a right knee arthroscopy.      Current Outpatient Medications on File Prior to Visit   Medication Sig Dispense Refill   • simvastatin (ZOCOR) 20 MG Tab TAKE ONE TABLET BY MOUTH ONE TIME DAILY IN THE P.M. 90 Tablet 4   • omeprazole (PRILOSEC) 20 MG delayed-release capsule Take 1 capsule by mouth every day. 90 capsule 3   • hyoscyamine (LEVSIN) 0.125 MG SL Tab Take 1 Tab by mouth every 6 hours as needed for Cramping. 30 Tab 1   • Fiber Powder Take 1 Each by mouth every day.     • therapeutic multivitamin-minerals (THERAGRAN-M) Tab Take 1 Tab by mouth every day. 30 Tab 11   • Omega-3 Fatty Acids (FISH OIL) 1200 MG Cap Take  by mouth.     • POTASSIUM BICARBONATE PO Take  by mouth.       No current facility-administered medications on file prior to visit.          Objective:     Vitals:    05/02/22 1118   BP: 134/74   BP Location: Left arm   Patient Position: Sitting   BP Cuff Size: Adult long   Pulse: 66   Resp: 18   Temp: 36.4 °C (97.5 °F)   TempSrc: Temporal   SpO2: 97%   Weight: 87.9 kg (193 lb 12.6 oz)   Height: 1.778 m (5' 10\")       Physical Exam:  General: alert in no apparent distress.   MSK: Right knee with negative anterior drawer, negative posterior drawer, negative Grisel testing, negative varus and valgus stress testing.  No effusion.  No redness or warmth.  No popliteal pulsation.        Assessment and Plan:     1. Acute pain of right knee  Exam is reassuring.  May be osteoarthritis.  Will obtain an x-ray " and if this is normal, the patient will begin the [facility] rehab program.  Handout was provided to him.  I will also start him on a few days of meloxicam.  - DX-KNEE COMPLETE 4+ RIGHT; Future  - meloxicam (MOBIC) 7.5 MG Tab; Take 1 Tablet by mouth every day for 7 days. Take with food.  Dispense: 7 Tablet; Refill: 0        Followup: Return in about 4 weeks (around 5/30/2022), or if symptoms worsen or fail to improve.

## 2022-05-09 ENCOUNTER — HOSPITAL ENCOUNTER (OUTPATIENT)
Dept: RADIOLOGY | Facility: MEDICAL CENTER | Age: 72
End: 2022-05-09
Attending: FAMILY MEDICINE
Payer: COMMERCIAL

## 2022-05-09 DIAGNOSIS — M25.561 ACUTE PAIN OF RIGHT KNEE: ICD-10-CM

## 2022-05-09 PROCEDURE — 73564 X-RAY EXAM KNEE 4 OR MORE: CPT | Mod: RT

## 2022-05-10 ENCOUNTER — APPOINTMENT (OUTPATIENT)
Dept: RADIOLOGY | Facility: MEDICAL CENTER | Age: 72
End: 2022-05-10
Attending: FAMILY MEDICINE
Payer: COMMERCIAL

## 2022-05-10 RX ORDER — MELOXICAM 7.5 MG/1
TABLET ORAL
Qty: 7 TABLET | OUTPATIENT
Start: 2022-05-10

## 2022-05-31 ENCOUNTER — HOSPITAL ENCOUNTER (OUTPATIENT)
Facility: MEDICAL CENTER | Age: 72
End: 2022-05-31
Attending: FAMILY MEDICINE
Payer: COMMERCIAL

## 2022-05-31 ENCOUNTER — OFFICE VISIT (OUTPATIENT)
Dept: MEDICAL GROUP | Facility: MEDICAL CENTER | Age: 72
End: 2022-05-31
Payer: COMMERCIAL

## 2022-05-31 VITALS
DIASTOLIC BLOOD PRESSURE: 60 MMHG | TEMPERATURE: 97.5 F | WEIGHT: 191 LBS | OXYGEN SATURATION: 95 % | HEART RATE: 74 BPM | HEIGHT: 70 IN | BODY MASS INDEX: 27.35 KG/M2 | SYSTOLIC BLOOD PRESSURE: 116 MMHG

## 2022-05-31 DIAGNOSIS — R21 RASH: ICD-10-CM

## 2022-05-31 DIAGNOSIS — R05.9 COUGH: ICD-10-CM

## 2022-05-31 DIAGNOSIS — M25.561 ACUTE PAIN OF RIGHT KNEE: ICD-10-CM

## 2022-05-31 PROCEDURE — U0003 INFECTIOUS AGENT DETECTION BY NUCLEIC ACID (DNA OR RNA); SEVERE ACUTE RESPIRATORY SYNDROME CORONAVIRUS 2 (SARS-COV-2) (CORONAVIRUS DISEASE [COVID-19]), AMPLIFIED PROBE TECHNIQUE, MAKING USE OF HIGH THROUGHPUT TECHNOLOGIES AS DESCRIBED BY CMS-2020-01-R: HCPCS

## 2022-05-31 PROCEDURE — 99214 OFFICE O/P EST MOD 30 MIN: CPT | Performed by: FAMILY MEDICINE

## 2022-05-31 PROCEDURE — U0005 INFEC AGEN DETEC AMPLI PROBE: HCPCS

## 2022-05-31 RX ORDER — CLOTRIMAZOLE AND BETAMETHASONE DIPROPIONATE 10; .64 MG/G; MG/G
1 CREAM TOPICAL 2 TIMES DAILY
Qty: 45 G | Refills: 0 | Status: SHIPPED | OUTPATIENT
Start: 2022-05-31 | End: 2022-06-07

## 2022-05-31 ASSESSMENT — FIBROSIS 4 INDEX: FIB4 SCORE: 1.32

## 2022-05-31 NOTE — ASSESSMENT & PLAN NOTE
Patient describes 1 day of sore throat with runny nose and cough.  He denies fever, chills and shortness of breath.  He took a home COVID test and this was negative.

## 2022-05-31 NOTE — PROGRESS NOTES
"[facility]  Progress Note  Established Patient    Subjective:   Heber Jacobs is a 72 y.o. male here today with a chief complaint of cough. The patient is alone.     Cough  Patient describes 1 day of sore throat with runny nose and cough.  He denies fever, chills and shortness of breath.  He took a home COVID test and this was negative.    Rash  Patient describes a circumferential rash on the right lower extremity over the past several months.  It increased in size and then decreased again.    Right knee pain  Only 10% better with home exercises.      Current Outpatient Medications on File Prior to Visit   Medication Sig Dispense Refill   • simvastatin (ZOCOR) 20 MG Tab TAKE ONE TABLET BY MOUTH ONE TIME DAILY IN THE P.M. 90 Tablet 4   • omeprazole (PRILOSEC) 20 MG delayed-release capsule Take 1 capsule by mouth every day. 90 capsule 3   • hyoscyamine (LEVSIN) 0.125 MG SL Tab Take 1 Tab by mouth every 6 hours as needed for Cramping. 30 Tab 1   • Fiber Powder Take 1 Each by mouth every day.     • therapeutic multivitamin-minerals (THERAGRAN-M) Tab Take 1 Tab by mouth every day. 30 Tab 11   • Omega-3 Fatty Acids (FISH OIL) 1200 MG Cap Take  by mouth.       No current facility-administered medications on file prior to visit.          Objective:     Vitals:    05/31/22 1113   BP: 116/60   BP Location: Left arm   Patient Position: Sitting   BP Cuff Size: Adult   Pulse: 74   Temp: 36.4 °C (97.5 °F)   TempSrc: Temporal   SpO2: 95%   Weight: 86.6 kg (191 lb)   Height: 1.778 m (5' 10\")       Physical Exam:  General: alert in no apparent distress.   ENMT: No pharyngeal erythema or tonsillar exudates.  Uvula midline.  No cervical lymphadenopathy.  Respiratory: Clear to auscultate bilaterally.  Cardio: Regular rate and rhythm.  MSK: Right knee with negative anterior drawer, negative posterior drawer and negative Grisel testing.  Skin: Nickel size is slightly erythematous nummular right lower extremity " rash.    Assessment and Plan:     1. Acute pain of right knee  - Referral to Sports Medicine    2. Cough  Recommended Flonase in the event this is allergies.  Will obtain COVID PCR and asked patient to self isolate as we await the results.  No indication for strep testing or treatment per [facility].  Not consistent with bacterial pneumonia.  - COVID/SARS CoV-2 PCR; Future    3. Rash  Treat with Lotrisone.  If no resolution, I asked the patient to let me know.  - clotrimazole-betamethasone (LOTRISONE) 1-0.05 % Cream; Apply 1 Application topically 2 times a day for 7 days.  Dispense: 45 g; Refill: 0        Followup: Return if symptoms worsen or fail to improve.

## 2022-05-31 NOTE — LETTER
May 31, 2022    To Whom It May Concern:         This is confirmation that Heber Jacobs attended his scheduled appointment with Levi Akers M.D. on 5/31/22. Please excuse him from work until we get a negative COVID test.     Sincerely,    Levi Akers M.D.  [phone]

## 2022-05-31 NOTE — ASSESSMENT & PLAN NOTE
Patient describes a circumferential rash on the right lower extremity over the past several months.  It increased in size and then decreased again.

## 2022-06-01 LAB
COVID ORDER STATUS COVID19: NORMAL
SARS-COV-2 RNA RESP QL NAA+PROBE: DETECTED
SPECIMEN SOURCE: ABNORMAL

## 2022-07-05 SDOH — HEALTH STABILITY: PHYSICAL HEALTH: ON AVERAGE, HOW MANY DAYS PER WEEK DO YOU ENGAGE IN MODERATE TO STRENUOUS EXERCISE (LIKE A BRISK WALK)?: 4 DAYS

## 2022-07-05 SDOH — ECONOMIC STABILITY: FOOD INSECURITY: WITHIN THE PAST 12 MONTHS, YOU WORRIED THAT YOUR FOOD WOULD RUN OUT BEFORE YOU GOT MONEY TO BUY MORE.: NEVER TRUE

## 2022-07-05 SDOH — ECONOMIC STABILITY: INCOME INSECURITY: IN THE LAST 12 MONTHS, WAS THERE A TIME WHEN YOU WERE NOT ABLE TO PAY THE MORTGAGE OR RENT ON TIME?: NO

## 2022-07-05 SDOH — ECONOMIC STABILITY: FOOD INSECURITY: WITHIN THE PAST 12 MONTHS, THE FOOD YOU BOUGHT JUST DIDN'T LAST AND YOU DIDN'T HAVE MONEY TO GET MORE.: NEVER TRUE

## 2022-07-05 SDOH — HEALTH STABILITY: PHYSICAL HEALTH: ON AVERAGE, HOW MANY MINUTES DO YOU ENGAGE IN EXERCISE AT THIS LEVEL?: 90 MIN

## 2022-07-05 SDOH — ECONOMIC STABILITY: HOUSING INSECURITY: IN THE LAST 12 MONTHS, HOW MANY PLACES HAVE YOU LIVED?: 1

## 2022-07-05 SDOH — ECONOMIC STABILITY: INCOME INSECURITY: HOW HARD IS IT FOR YOU TO PAY FOR THE VERY BASICS LIKE FOOD, HOUSING, MEDICAL CARE, AND HEATING?: NOT HARD AT ALL

## 2022-07-05 ASSESSMENT — SOCIAL DETERMINANTS OF HEALTH (SDOH)
IN A TYPICAL WEEK, HOW MANY TIMES DO YOU TALK ON THE PHONE WITH FAMILY, FRIENDS, OR NEIGHBORS?: THREE TIMES A WEEK
HOW MANY DRINKS CONTAINING ALCOHOL DO YOU HAVE ON A TYPICAL DAY WHEN YOU ARE DRINKING: 1 OR 2
HOW OFTEN DO YOU HAVE A DRINK CONTAINING ALCOHOL: 2-4 TIMES A MONTH
HOW OFTEN DO YOU GET TOGETHER WITH FRIENDS OR RELATIVES?: ONCE A WEEK
HOW OFTEN DO YOU GET TOGETHER WITH FRIENDS OR RELATIVES?: ONCE A WEEK
HOW OFTEN DO YOU ATTEND CHURCH OR RELIGIOUS SERVICES?: NEVER
HOW OFTEN DO YOU ATTEND CHURCH OR RELIGIOUS SERVICES?: NEVER
ARE YOU MARRIED, WIDOWED, DIVORCED, SEPARATED, NEVER MARRIED, OR LIVING WITH A PARTNER?: NEVER MARRIED
HOW OFTEN DO YOU ATTENT MEETINGS OF THE CLUB OR ORGANIZATION YOU BELONG TO?: 1 TO 4 TIMES PER YEAR
ARE YOU MARRIED, WIDOWED, DIVORCED, SEPARATED, NEVER MARRIED, OR LIVING WITH A PARTNER?: NEVER MARRIED
HOW HARD IS IT FOR YOU TO PAY FOR THE VERY BASICS LIKE FOOD, HOUSING, MEDICAL CARE, AND HEATING?: NOT HARD AT ALL
HOW OFTEN DO YOU ATTENT MEETINGS OF THE CLUB OR ORGANIZATION YOU BELONG TO?: 1 TO 4 TIMES PER YEAR
IN A TYPICAL WEEK, HOW MANY TIMES DO YOU TALK ON THE PHONE WITH FAMILY, FRIENDS, OR NEIGHBORS?: THREE TIMES A WEEK
WITHIN THE PAST 12 MONTHS, YOU WORRIED THAT YOUR FOOD WOULD RUN OUT BEFORE YOU GOT THE MONEY TO BUY MORE: NEVER TRUE
HOW OFTEN DO YOU HAVE SIX OR MORE DRINKS ON ONE OCCASION: NEVER

## 2022-07-05 ASSESSMENT — LIFESTYLE VARIABLES
HOW MANY STANDARD DRINKS CONTAINING ALCOHOL DO YOU HAVE ON A TYPICAL DAY: 1 OR 2
SKIP TO QUESTIONS 9-10: 1
HOW OFTEN DO YOU HAVE A DRINK CONTAINING ALCOHOL: 2-4 TIMES A MONTH
HOW OFTEN DO YOU HAVE SIX OR MORE DRINKS ON ONE OCCASION: NEVER
AUDIT-C TOTAL SCORE: 2

## 2022-07-08 ENCOUNTER — OFFICE VISIT (OUTPATIENT)
Dept: MEDICAL GROUP | Facility: MEDICAL CENTER | Age: 72
End: 2022-07-08
Payer: COMMERCIAL

## 2022-07-08 VITALS
DIASTOLIC BLOOD PRESSURE: 70 MMHG | SYSTOLIC BLOOD PRESSURE: 122 MMHG | HEART RATE: 69 BPM | WEIGHT: 197.09 LBS | BODY MASS INDEX: 28.22 KG/M2 | RESPIRATION RATE: 18 BRPM | HEIGHT: 70 IN | TEMPERATURE: 97.7 F | OXYGEN SATURATION: 95 %

## 2022-07-08 DIAGNOSIS — Z00.00 HEALTHCARE MAINTENANCE: ICD-10-CM

## 2022-07-08 DIAGNOSIS — M25.511 ACUTE PAIN OF RIGHT SHOULDER: ICD-10-CM

## 2022-07-08 DIAGNOSIS — R21 RASH: ICD-10-CM

## 2022-07-08 DIAGNOSIS — M25.559 HIP PAIN: ICD-10-CM

## 2022-07-08 DIAGNOSIS — L82.1 SEBORRHEIC KERATOSIS: ICD-10-CM

## 2022-07-08 DIAGNOSIS — I87.2 VENOUS INSUFFICIENCY: ICD-10-CM

## 2022-07-08 PROBLEM — L84 CORN: Status: RESOLVED | Noted: 2019-04-29 | Resolved: 2022-07-08

## 2022-07-08 PROBLEM — S63.601A SPRAIN OF RIGHT THUMB: Status: RESOLVED | Noted: 2021-11-04 | Resolved: 2022-07-08

## 2022-07-08 PROBLEM — M53.3 SI (SACROILIAC) PAIN: Status: RESOLVED | Noted: 2018-03-07 | Resolved: 2022-07-08

## 2022-07-08 PROBLEM — R05.9 COUGH: Status: RESOLVED | Noted: 2022-05-31 | Resolved: 2022-07-08

## 2022-07-08 PROBLEM — E66.3 OVERWEIGHT: Status: RESOLVED | Noted: 2018-05-17 | Resolved: 2022-07-08

## 2022-07-08 PROBLEM — D64.9 ANEMIA: Status: RESOLVED | Noted: 2021-11-04 | Resolved: 2022-07-08

## 2022-07-08 PROBLEM — M25.519 SHOULDER PAIN: Status: ACTIVE | Noted: 2022-07-08

## 2022-07-08 PROCEDURE — 99397 PER PM REEVAL EST PAT 65+ YR: CPT | Performed by: FAMILY MEDICINE

## 2022-07-08 ASSESSMENT — ACTIVITIES OF DAILY LIVING (ADL): BATHING_REQUIRES_ASSISTANCE: 0

## 2022-07-08 ASSESSMENT — ENCOUNTER SYMPTOMS: GENERAL WELL-BEING: EXCELLENT

## 2022-07-08 ASSESSMENT — FIBROSIS 4 INDEX: FIB4 SCORE: 1.32

## 2022-07-08 ASSESSMENT — PATIENT HEALTH QUESTIONNAIRE - PHQ9: CLINICAL INTERPRETATION OF PHQ2 SCORE: 0

## 2022-07-08 NOTE — ASSESSMENT & PLAN NOTE
Lipids: ordered.  Fasting Glucose: ordered.  Hepatitis C Screen: 2018 was normal.  Colonoscopy: done 2022 with 5 year recall.   PSA: ordered.

## 2022-07-08 NOTE — ASSESSMENT & PLAN NOTE
The right lower extremity circumferential rash has improved but there remains a little bit of residual pigmentation.  The patient continues to use Lotrisone on the area.

## 2022-07-08 NOTE — ASSESSMENT & PLAN NOTE
Patient identifies a midline back skin lesion that feels rough and he notices.  He has been using Lotrisone on the area without success.

## 2022-07-08 NOTE — ASSESSMENT & PLAN NOTE
The patient reports years of left postero-lateral hip pain.  He was told that he has osteoarthritis in that hip.  For the past 12 hours, however, he has started to develop right postero-lateral hip pain.  He points to the area of the SI joint.  He denies associated trauma or injury.  He denies bowel or bladder incontinence or retention or perineal anesthesia.

## 2022-07-08 NOTE — ASSESSMENT & PLAN NOTE
Patient mentions 1 or 2 months of right lateral shoulder pain which is mild.  He really feels it when he moves it in certain ways.  He denies associated trauma.  He denies sharp, shooting pain down the arm.

## 2022-07-14 ENCOUNTER — HOSPITAL ENCOUNTER (OUTPATIENT)
Dept: LAB | Facility: MEDICAL CENTER | Age: 72
End: 2022-07-14
Attending: FAMILY MEDICINE
Payer: COMMERCIAL

## 2022-07-14 DIAGNOSIS — D50.9 IRON DEFICIENCY ANEMIA, UNSPECIFIED IRON DEFICIENCY ANEMIA TYPE: ICD-10-CM

## 2022-07-14 DIAGNOSIS — Z00.00 HEALTHCARE MAINTENANCE: ICD-10-CM

## 2022-07-14 LAB
ALBUMIN SERPL BCP-MCNC: 4.6 G/DL (ref 3.2–4.9)
ALBUMIN/GLOB SERPL: 1.9 G/DL
ALP SERPL-CCNC: 66 U/L (ref 30–99)
ALT SERPL-CCNC: 17 U/L (ref 2–50)
ANION GAP SERPL CALC-SCNC: 8 MMOL/L (ref 7–16)
AST SERPL-CCNC: 19 U/L (ref 12–45)
BASOPHILS # BLD AUTO: 0.4 % (ref 0–1.8)
BASOPHILS # BLD: 0.02 K/UL (ref 0–0.12)
BILIRUB SERPL-MCNC: 0.9 MG/DL (ref 0.1–1.5)
BUN SERPL-MCNC: 13 MG/DL (ref 8–22)
CALCIUM SERPL-MCNC: 9.3 MG/DL (ref 8.5–10.5)
CHLORIDE SERPL-SCNC: 107 MMOL/L (ref 96–112)
CHOLEST SERPL-MCNC: 164 MG/DL (ref 100–199)
CO2 SERPL-SCNC: 24 MMOL/L (ref 20–33)
CREAT SERPL-MCNC: 0.83 MG/DL (ref 0.5–1.4)
EOSINOPHIL # BLD AUTO: 0.17 K/UL (ref 0–0.51)
EOSINOPHIL NFR BLD: 3.7 % (ref 0–6.9)
ERYTHROCYTE [DISTWIDTH] IN BLOOD BY AUTOMATED COUNT: 48.4 FL (ref 35.9–50)
FASTING STATUS PATIENT QL REPORTED: NORMAL
GFR SERPLBLD CREATININE-BSD FMLA CKD-EPI: 94 ML/MIN/1.73 M 2
GLOBULIN SER CALC-MCNC: 2.4 G/DL (ref 1.9–3.5)
GLUCOSE SERPL-MCNC: 93 MG/DL (ref 65–99)
HCT VFR BLD AUTO: 44.3 % (ref 42–52)
HDLC SERPL-MCNC: 53 MG/DL
HGB BLD-MCNC: 15.4 G/DL (ref 14–18)
IMM GRANULOCYTES # BLD AUTO: 0.02 K/UL (ref 0–0.11)
IMM GRANULOCYTES NFR BLD AUTO: 0.4 % (ref 0–0.9)
LDLC SERPL CALC-MCNC: 95 MG/DL
LYMPHOCYTES # BLD AUTO: 1.57 K/UL (ref 1–4.8)
LYMPHOCYTES NFR BLD: 33.8 % (ref 22–41)
MCH RBC QN AUTO: 35 PG (ref 27–33)
MCHC RBC AUTO-ENTMCNC: 34.8 G/DL (ref 33.7–35.3)
MCV RBC AUTO: 100.7 FL (ref 81.4–97.8)
MONOCYTES # BLD AUTO: 0.46 K/UL (ref 0–0.85)
MONOCYTES NFR BLD AUTO: 9.9 % (ref 0–13.4)
NEUTROPHILS # BLD AUTO: 2.41 K/UL (ref 1.82–7.42)
NEUTROPHILS NFR BLD: 51.8 % (ref 44–72)
NRBC # BLD AUTO: 0 K/UL
NRBC BLD-RTO: 0 /100 WBC
PLATELET # BLD AUTO: 206 K/UL (ref 164–446)
PMV BLD AUTO: 9.8 FL (ref 9–12.9)
POTASSIUM SERPL-SCNC: 4.5 MMOL/L (ref 3.6–5.5)
PROT SERPL-MCNC: 7 G/DL (ref 6–8.2)
PSA SERPL-MCNC: 2.54 NG/ML (ref 0–4)
RBC # BLD AUTO: 4.4 M/UL (ref 4.7–6.1)
SODIUM SERPL-SCNC: 139 MMOL/L (ref 135–145)
TRIGL SERPL-MCNC: 78 MG/DL (ref 0–149)
WBC # BLD AUTO: 4.7 K/UL (ref 4.8–10.8)

## 2022-07-14 PROCEDURE — 80061 LIPID PANEL: CPT

## 2022-07-14 PROCEDURE — 80053 COMPREHEN METABOLIC PANEL: CPT

## 2022-07-14 PROCEDURE — 85025 COMPLETE CBC W/AUTO DIFF WBC: CPT

## 2022-07-14 PROCEDURE — 36415 COLL VENOUS BLD VENIPUNCTURE: CPT

## 2022-07-14 PROCEDURE — 84153 ASSAY OF PSA TOTAL: CPT

## 2022-07-20 ENCOUNTER — OFFICE VISIT (OUTPATIENT)
Dept: SPORTS MEDICINE | Facility: CLINIC | Age: 72
End: 2022-07-20
Payer: COMMERCIAL

## 2022-07-20 VITALS
RESPIRATION RATE: 16 BRPM | DIASTOLIC BLOOD PRESSURE: 72 MMHG | BODY MASS INDEX: 28.2 KG/M2 | HEIGHT: 70 IN | TEMPERATURE: 98.3 F | OXYGEN SATURATION: 97 % | SYSTOLIC BLOOD PRESSURE: 118 MMHG | WEIGHT: 197 LBS | HEART RATE: 64 BPM

## 2022-07-20 DIAGNOSIS — M17.11 PRIMARY OSTEOARTHRITIS OF RIGHT KNEE: ICD-10-CM

## 2022-07-20 DIAGNOSIS — K22.719 BARRETT'S ESOPHAGUS WITH DYSPLASIA: ICD-10-CM

## 2022-07-20 PROCEDURE — 99214 OFFICE O/P EST MOD 30 MIN: CPT | Performed by: FAMILY MEDICINE

## 2022-07-20 RX ORDER — OMEPRAZOLE 20 MG/1
CAPSULE, DELAYED RELEASE ORAL
Qty: 90 CAPSULE | Refills: 1 | Status: SHIPPED | OUTPATIENT
Start: 2022-07-20 | End: 2022-10-21 | Stop reason: SDUPTHER

## 2022-07-20 ASSESSMENT — ENCOUNTER SYMPTOMS: FEVER: 0

## 2022-07-20 ASSESSMENT — FIBROSIS 4 INDEX: FIB4 SCORE: 1.57

## 2022-07-20 NOTE — PROGRESS NOTES
Subjective:     Heber Jacobs is a 72 y.o. male who presents for Shoulder Pain (Right shoulder pain, possible rotator problem. The patient saw his PCP recently and discussed that the shoulder has been bothering the patient. Did some exercises with some benefit. ) and Knee Pain (Right knee, has mensicus surgery and right knee started to the bother the patient again in the past 2 months.)    HPI  Pt presents for evaluation of right knee pain   2013 Right meniscus surgery   Was feeling well until about 3 months ago   Pain started while getting a massage when his knee was hyperflexed  Pain has been present since that injury  Pain is mild, however constantly present and bothers him  Pain is more in the anterior knee  Has a stiff feeling   No numbness or tingling   No radiation to hip or ankle   No posterior pain   No feelings of instability     Review of Systems   Constitutional: Negative for fever.   Skin: Negative for rash.       PMH:  has a past medical history of Downey esophagus, GERD (gastroesophageal reflux disease), Hemorrhoid, Hyperlipidemia, and Nocturia.  MEDS:   Current Outpatient Medications:   •  simvastatin (ZOCOR) 20 MG Tab, TAKE ONE TABLET BY MOUTH ONE TIME DAILY IN THE P.M., Disp: 90 Tablet, Rfl: 4  •  omeprazole (PRILOSEC) 20 MG delayed-release capsule, Take 1 capsule by mouth every day., Disp: 90 capsule, Rfl: 3  •  hyoscyamine (LEVSIN) 0.125 MG SL Tab, Take 1 Tab by mouth every 6 hours as needed for Cramping., Disp: 30 Tab, Rfl: 1  •  Fiber Powder, Take 1 Each by mouth every day., Disp: , Rfl:   •  therapeutic multivitamin-minerals (THERAGRAN-M) Tab, Take 1 Tab by mouth every day., Disp: 30 Tab, Rfl: 11  •  Omega-3 Fatty Acids (FISH OIL) 1200 MG Cap, Take  by mouth., Disp: , Rfl:   ALLERGIES: No Known Allergies  SURGHX:   Past Surgical History:   Procedure Laterality Date   • KNEE ARTHROSCOPY  9/ 2013    right knee meniscus tear   • EXPLORATORY LAPAROTOMY       SOCHX:  reports that he has never  "smoked. He has never used smokeless tobacco. He reports current alcohol use. He reports that he does not use drugs.     Objective:   /72 (BP Location: Left arm, Patient Position: Sitting, BP Cuff Size: Adult)   Pulse 64   Temp 36.8 °C (98.3 °F) (Temporal)   Resp 16   Ht 1.778 m (5' 10\")   Wt 89.4 kg (197 lb)   SpO2 97%   BMI 28.27 kg/m²     Physical Exam  Constitutional:       General: He is not in acute distress.     Appearance: He is well-developed. He is not diaphoretic.   Pulmonary:      Effort: Pulmonary effort is normal.   Neurological:      Mental Status: He is alert.     Right knee  Appearance - No bruising, erythema, or deformity appreciated  Palpation - +Mild TTP along medial knee.  No tenderness to palpation along patellar tendon, hamstring tendons, or quads.    ROM - FROM with crepitus  Strength - 5/5 throughout  Neuro - Sensation equal and intact bilaterally  Special testing - No laxity or pain with varus/valgus stress, neg anterior drawer, neg posterior drawer, neg Lachman's, neg Grisel's, neg patellar apprehension test    Assessment/Plan:   Assessment    1. Primary osteoarthritis of right knee  Patient with primary osteoarthritis of right knee.  Had a long discussion of treatment options and expected course recovery.  Patient prefers to avoid corticosteroid injections at this time and will focus on home exercise program and also topical Voltaren gel.  He may use compressive knee sleeve as needed.  Reviewed his home exercises and which things to avoid.  I demonstrated proper technique for a few exercises.  Patient is also working with a  to help demonstrate exercises.  Will delay formal physical therapy referral at this time as patient has pretty good treatment set up right now.  Plan to follow-up if not making great improvements over the next few weeks or if he changes his mind about trying corticosteroid injection.    Patient also did state that his shoulder has been bothering " him.  He is already working on some home exercises from his PCP.  Unfortunately, not enough time to address shoulder at this visit and patient will make a follow-up appointment to discuss his shoulder in more depth.    My total time spent caring for the patient on the day of the encounter was 39 minutes.   This does not include time spent on separately billable procedures/tests.

## 2022-07-20 NOTE — LETTER
"    July 20, 2022      Short-term  Staying off the knee somewhat, and avoiding heavy lifting and heavy exercise can be beneficial  Mild activity such as going for walks and range of motion exercises are good  Ice will help temporarily  Knee braces help temporarily (these do not need to be heavy knee braces and could be a simple compression wrap)  Topical diclofenac cream or capsaicin ointment will both make the knee feel better  Injection into the knee with steroids could help greatly    Long-term  Regular exercise to keep the knee muscles strong without doing types of exercise which exacerbate the pain.  The best types of exercises are smooth motions and with minimal pounding.  Running and jumping will make the pain worse.  Cycling and pool exercise are fantastic.  Working with the physical therapist for a short time can be greatly beneficial  Some people need intermittent injections to help knee pain long-term, sometimes these are steroid (\"cortisone\") shots, and sometimes they are something else called hyaluronic acid (Euflexxa, Synvisc, Orthovisc, Hyalgan)              If you have any questions please do not hesitate to call me at the phone number listed below.    Sincerely,          Narciso Alvarez M.D.  [phone]                  "

## 2022-07-26 ENCOUNTER — HOSPITAL ENCOUNTER (OUTPATIENT)
Dept: RADIOLOGY | Facility: MEDICAL CENTER | Age: 72
End: 2022-07-26
Attending: FAMILY MEDICINE
Payer: COMMERCIAL

## 2022-07-26 ENCOUNTER — OFFICE VISIT (OUTPATIENT)
Dept: MEDICAL GROUP | Facility: PHYSICIAN GROUP | Age: 72
End: 2022-07-26
Payer: COMMERCIAL

## 2022-07-26 VITALS
BODY MASS INDEX: 27.63 KG/M2 | DIASTOLIC BLOOD PRESSURE: 70 MMHG | OXYGEN SATURATION: 95 % | TEMPERATURE: 97.8 F | HEART RATE: 68 BPM | HEIGHT: 70 IN | WEIGHT: 193 LBS | SYSTOLIC BLOOD PRESSURE: 118 MMHG

## 2022-07-26 DIAGNOSIS — S92.515A CLOSED NONDISPLACED FRACTURE OF PROXIMAL PHALANX OF LESSER TOE OF LEFT FOOT, INITIAL ENCOUNTER: ICD-10-CM

## 2022-07-26 DIAGNOSIS — M79.675 PAIN OF TOE OF LEFT FOOT: ICD-10-CM

## 2022-07-26 PROCEDURE — 99213 OFFICE O/P EST LOW 20 MIN: CPT | Performed by: FAMILY MEDICINE

## 2022-07-26 PROCEDURE — 73630 X-RAY EXAM OF FOOT: CPT | Mod: LT

## 2022-07-26 ASSESSMENT — ENCOUNTER SYMPTOMS
NAUSEA: 0
FEVER: 0
HEADACHES: 0
BLURRED VISION: 0
DOUBLE VISION: 0
SORE THROAT: 0
PALPITATIONS: 0
DIZZINESS: 0
MYALGIAS: 0
COUGH: 0
VOMITING: 0
SHORTNESS OF BREATH: 0
CHILLS: 0

## 2022-07-26 ASSESSMENT — FIBROSIS 4 INDEX: FIB4 SCORE: 1.57

## 2022-07-26 NOTE — PROGRESS NOTES
"Subjective:     CC: L 5th pinkie toe injury    HPI:   Heber presents today with     fri morning  pinkit toe vs coffee table  Bleeding from nail,   Pain improved then worsened over weekend  No bruising  Wearing sandels      No problems updated.    Current Outpatient Medications Ordered in Epic   Medication Sig Dispense Refill   • omeprazole (PRILOSEC) 20 MG delayed-release capsule TAKE ONE CAPSULE BY MOUTH ONE TIME DAILY 90 Capsule 1   • simvastatin (ZOCOR) 20 MG Tab TAKE ONE TABLET BY MOUTH ONE TIME DAILY IN THE P.M. 90 Tablet 4   • hyoscyamine (LEVSIN) 0.125 MG SL Tab Take 1 Tab by mouth every 6 hours as needed for Cramping. 30 Tab 1   • Fiber Powder Take 1 Each by mouth every day.     • therapeutic multivitamin-minerals (THERAGRAN-M) Tab Take 1 Tab by mouth every day. 30 Tab 11   • Omega-3 Fatty Acids (FISH OIL) 1200 MG Cap Take  by mouth.       No current Epic-ordered facility-administered medications on file.     ROS:  Review of Systems   Constitutional: Negative for chills and fever.   HENT: Negative for ear pain and sore throat.    Eyes: Negative for blurred vision and double vision.   Respiratory: Negative for cough and shortness of breath.    Cardiovascular: Negative for chest pain and palpitations.   Gastrointestinal: Negative for nausea and vomiting.   Genitourinary: Negative for dysuria and hematuria.   Musculoskeletal: Positive for joint pain. Negative for myalgias.   Neurological: Negative for dizziness and headaches.       Objective:     Exam:  /70 (BP Location: Left arm, Patient Position: Sitting, BP Cuff Size: Adult)   Pulse 68   Temp 36.6 °C (97.8 °F) (Temporal)   Ht 1.778 m (5' 10\")   Wt 87.5 kg (193 lb)   SpO2 95%   BMI 27.69 kg/m²  Body mass index is 27.69 kg/m².    Physical Exam  Constitutional:       Appearance: Normal appearance.   Eyes:      General: No scleral icterus.        Right eye: No discharge.         Left eye: No discharge.      Extraocular Movements: Extraocular " movements intact.      Conjunctiva/sclera: Conjunctivae normal.      Pupils: Pupils are equal, round, and reactive to light.   Musculoskeletal:         General: Tenderness and signs of injury present. No swelling.   Skin:     Findings: No bruising.             Comments: L 5th pinkie nail blood stained, no swelling no bruising  Tender       Neurological:      Mental Status: He is alert.      Gait: Gait normal.           Adal taped toe    Assessment & Plan:     72 y.o. male with the following -     Problem List Items Addressed This Visit    None     Visit Diagnoses     Pain of toe of left foot            No follow-ups on file.    Please note that this dictation was created using voice recognition software. I have made every reasonable attempt to correct obvious errors, but I expect that there are errors of grammar and possibly content that I did not discover before finalizing the note.

## 2022-07-26 NOTE — ASSESSMENT & PLAN NOTE
New  F/u xray  Rigid soled shoe, RICE and nsaids  Epsom salt soak and elevate  If frax, podiatry  rtc if concerns

## 2022-08-01 ENCOUNTER — APPOINTMENT (RX ONLY)
Dept: URBAN - METROPOLITAN AREA CLINIC 6 | Facility: CLINIC | Age: 72
Setting detail: DERMATOLOGY
End: 2022-08-01

## 2022-08-01 DIAGNOSIS — L82.1 OTHER SEBORRHEIC KERATOSIS: ICD-10-CM

## 2022-08-01 DIAGNOSIS — Z71.89 OTHER SPECIFIED COUNSELING: ICD-10-CM

## 2022-08-01 DIAGNOSIS — Q82.5 CONGENITAL NON-NEOPLASTIC NEVUS: ICD-10-CM

## 2022-08-01 DIAGNOSIS — D22 MELANOCYTIC NEVI: ICD-10-CM

## 2022-08-01 DIAGNOSIS — L81.4 OTHER MELANIN HYPERPIGMENTATION: ICD-10-CM

## 2022-08-01 DIAGNOSIS — L30.9 DERMATITIS, UNSPECIFIED: ICD-10-CM

## 2022-08-01 DIAGNOSIS — L40.8 OTHER PSORIASIS: ICD-10-CM

## 2022-08-01 DIAGNOSIS — D18.0 HEMANGIOMA: ICD-10-CM

## 2022-08-01 PROBLEM — D22.61 MELANOCYTIC NEVI OF RIGHT UPPER LIMB, INCLUDING SHOULDER: Status: ACTIVE | Noted: 2022-08-01

## 2022-08-01 PROBLEM — D22.5 MELANOCYTIC NEVI OF TRUNK: Status: ACTIVE | Noted: 2022-08-01

## 2022-08-01 PROBLEM — D22.72 MELANOCYTIC NEVI OF LEFT LOWER LIMB, INCLUDING HIP: Status: ACTIVE | Noted: 2022-08-01

## 2022-08-01 PROBLEM — D18.01 HEMANGIOMA OF SKIN AND SUBCUTANEOUS TISSUE: Status: ACTIVE | Noted: 2022-08-01

## 2022-08-01 PROBLEM — D22.71 MELANOCYTIC NEVI OF RIGHT LOWER LIMB, INCLUDING HIP: Status: ACTIVE | Noted: 2022-08-01

## 2022-08-01 PROBLEM — D22.62 MELANOCYTIC NEVI OF LEFT UPPER LIMB, INCLUDING SHOULDER: Status: ACTIVE | Noted: 2022-08-01

## 2022-08-01 PROCEDURE — ? ADDITIONAL NOTES

## 2022-08-01 PROCEDURE — ? DIAGNOSIS COMMENT

## 2022-08-01 PROCEDURE — ? COUNSELING

## 2022-08-01 PROCEDURE — 99203 OFFICE O/P NEW LOW 30 MIN: CPT

## 2022-08-01 ASSESSMENT — LOCATION SIMPLE DESCRIPTION DERM
LOCATION SIMPLE: LEFT UPPER ARM
LOCATION SIMPLE: RIGHT FOREARM
LOCATION SIMPLE: LEFT CHEEK
LOCATION SIMPLE: CHEST
LOCATION SIMPLE: GLUTEAL CLEFT
LOCATION SIMPLE: RIGHT POSTERIOR THIGH
LOCATION SIMPLE: RIGHT ANKLE
LOCATION SIMPLE: RIGHT LOWER BACK
LOCATION SIMPLE: RIGHT UPPER ARM
LOCATION SIMPLE: RIGHT CALF
LOCATION SIMPLE: LEFT THIGH
LOCATION SIMPLE: LEFT UPPER BACK
LOCATION SIMPLE: LEFT CALF
LOCATION SIMPLE: RIGHT UPPER BACK
LOCATION SIMPLE: RIGHT THIGH
LOCATION SIMPLE: LEFT POSTERIOR THIGH
LOCATION SIMPLE: LEFT FOREARM

## 2022-08-01 ASSESSMENT — LOCATION DETAILED DESCRIPTION DERM
LOCATION DETAILED: RIGHT ANTERIOR DISTAL UPPER ARM
LOCATION DETAILED: LEFT ANTERIOR PROXIMAL THIGH
LOCATION DETAILED: RIGHT VENTRAL DISTAL FOREARM
LOCATION DETAILED: RIGHT MEDIAL INFERIOR CHEST
LOCATION DETAILED: LEFT ANTERIOR DISTAL THIGH
LOCATION DETAILED: LEFT PROXIMAL DORSAL FOREARM
LOCATION DETAILED: LEFT ANTERIOR DISTAL UPPER ARM
LOCATION DETAILED: RIGHT PROXIMAL CALF
LOCATION DETAILED: RIGHT PROXIMAL DORSAL FOREARM
LOCATION DETAILED: LEFT INFERIOR UPPER BACK
LOCATION DETAILED: RIGHT MID-UPPER BACK
LOCATION DETAILED: RIGHT SUPERIOR LATERAL MIDBACK
LOCATION DETAILED: LEFT CENTRAL MALAR CHEEK
LOCATION DETAILED: RIGHT ANTERIOR MEDIAL MALLEOLUS
LOCATION DETAILED: RIGHT ANTERIOR PROXIMAL THIGH
LOCATION DETAILED: RIGHT VENTRAL PROXIMAL FOREARM
LOCATION DETAILED: RIGHT DISTAL POSTERIOR THIGH
LOCATION DETAILED: LEFT VENTRAL DISTAL FOREARM
LOCATION DETAILED: GLUTEAL CLEFT
LOCATION DETAILED: LEFT PROXIMAL CALF
LOCATION DETAILED: LEFT DISTAL POSTERIOR THIGH

## 2022-08-01 ASSESSMENT — LOCATION ZONE DERM
LOCATION ZONE: TRUNK
LOCATION ZONE: FACE
LOCATION ZONE: ARM
LOCATION ZONE: LEG

## 2022-08-01 NOTE — PROCEDURE: DIAGNOSIS COMMENT
Detail Level: Simple
Comment: Patient reports intermittent hyperpigmentation on this area. Not present today. \\nDiscussed possible Stasis Dermatitis vs. Capillaritis. Follow up PRN.
Render Risk Assessment In Note?: no

## 2022-08-01 NOTE — PROCEDURE: ADDITIONAL NOTES
Render Risk Assessment In Note?: no
Detail Level: Simple
Additional Notes: States rash quickly comes and goes, described as “irritation” in the superior gluteal cleft. \\nClear today. Encouraged to return to clinic for reevaluation if this returns.

## 2022-08-01 NOTE — PROCEDURE: COUNSELING
Detail Level: Zone
Topical Steroids Recommendations: Continue with hydrocortisone.
Detail Level: Detailed

## 2022-08-12 ENCOUNTER — OFFICE VISIT (OUTPATIENT)
Dept: MEDICAL GROUP | Facility: PHYSICIAN GROUP | Age: 72
End: 2022-08-12
Payer: COMMERCIAL

## 2022-08-12 VITALS
OXYGEN SATURATION: 95 % | TEMPERATURE: 96.7 F | HEART RATE: 68 BPM | WEIGHT: 191 LBS | HEIGHT: 70 IN | DIASTOLIC BLOOD PRESSURE: 70 MMHG | BODY MASS INDEX: 27.35 KG/M2 | SYSTOLIC BLOOD PRESSURE: 112 MMHG

## 2022-08-12 DIAGNOSIS — E78.5 DYSLIPIDEMIA: ICD-10-CM

## 2022-08-12 DIAGNOSIS — S92.535A CLOSED NONDISPLACED FRACTURE OF DISTAL PHALANX OF LESSER TOE OF LEFT FOOT, INITIAL ENCOUNTER: ICD-10-CM

## 2022-08-12 DIAGNOSIS — R71.8 ELEVATED MCV: ICD-10-CM

## 2022-08-12 PROBLEM — S92.502A: Status: ACTIVE | Noted: 2022-08-12

## 2022-08-12 PROCEDURE — 99214 OFFICE O/P EST MOD 30 MIN: CPT | Performed by: FAMILY MEDICINE

## 2022-08-12 ASSESSMENT — FIBROSIS 4 INDEX: FIB4 SCORE: 1.57

## 2022-08-12 NOTE — ASSESSMENT & PLAN NOTE
New issue  About 3 weeks had blunt trauma to left lesser toe  DX positive for fracture  Healing well so far  Painless right now  He is na taping

## 2022-08-12 NOTE — PROGRESS NOTES
"Subjective:     Chief Complaint   Patient presents with    Establish Care     X-ray results      Lab Results     MCV was high       HPI:   Heber presents today to discuss the following.    Closed fracture of phalanx of lesser toe of left foot  New issue  About 3 weeks had blunt trauma to left lesser toe  DX positive for fracture  Healing well so far  Painless right now  He is buddy taping    Elevated MCV  Noted to have mcv   asymptomatic    Dyslipidemia  Chronic issue  On statin    Past Medical History:   Diagnosis Date    Downey esophagus     GERD (gastroesophageal reflux disease)     Hemorrhoid     Hyperlipidemia     Nocturia        Current Outpatient Medications Ordered in Epic   Medication Sig Dispense Refill    omeprazole (PRILOSEC) 20 MG delayed-release capsule TAKE ONE CAPSULE BY MOUTH ONE TIME DAILY 90 Capsule 1    simvastatin (ZOCOR) 20 MG Tab TAKE ONE TABLET BY MOUTH ONE TIME DAILY IN THE P.M. 90 Tablet 4    hyoscyamine (LEVSIN) 0.125 MG SL Tab Take 1 Tab by mouth every 6 hours as needed for Cramping. 30 Tab 1    Fiber Powder Take 1 Each by mouth every day.      therapeutic multivitamin-minerals (THERAGRAN-M) Tab Take 1 Tab by mouth every day. 30 Tab 11    Omega-3 Fatty Acids (FISH OIL) 1200 MG Cap Take  by mouth.       No current Epic-ordered facility-administered medications on file.       Allergies:  Patient has no known allergies.    Health Maintenance: Completed    ROS:  Gen: no fevers/chills, no changes in weight  Eyes: no changes in vision  Pulm: no sob, no cough  CV: no chest pain, no palpitations  GI: no nausea/vomiting, no diarrhea      Objective:     Exam:  /70 (BP Location: Left arm, Patient Position: Sitting, BP Cuff Size: Adult)   Pulse 68   Temp 35.9 °C (96.7 °F) (Temporal)   Ht 1.778 m (5' 10\") Comment: pt reported  Wt 86.6 kg (191 lb)   SpO2 95%   BMI 27.41 kg/m²  Body mass index is 27.41 kg/m².        Constitutional: Alert, no distress, well-groomed.  Skin: Warm, dry, good " turgor, no rashes in visible areas.  Eye: Equal, round and reactive, conjunctiva clear, lids normal.  ENMT: Lips without lesions, good dentition, moist mucous membranes.  Neck: Trachea midline, no masses, no thyromegaly.  Respiratory: Unlabored respiratory effort, no cough.  MSK: Normal gait, moves all extremities.  Neuro: Grossly non-focal.   Psych: Alert and oriented x3, normal affect and mood.        Assessment & Plan:     72 y.o. male with the following -     1. Closed nondisplaced fracture of distal phalanx of lesser toe of left foot, initial encounter  This is a new problem.  The patient suffered left lesser toe blunt trauma with confirmed fracture on x-ray.  He has na taping and healing well.  Continue with na taping for another 3 to 4 weeks.    2. Elevated MCV  New problem.  Unknown etiology and prognosis.  We will begin work-up for this.  - VITAMIN B12; Future  - FOLATE; Future  - RETICULOCYTES COUNT; Future    3. Dyslipidemia  Chronic condition.  Continue with statin      Return in about 3 months (around 11/12/2022).    Please note that this dictation was created using voice recognition software. I have made every reasonable attempt to correct obvious errors, but I expect that there are errors of grammar and possibly content that I did not discover before finalizing the note.

## 2022-08-19 ENCOUNTER — HOSPITAL ENCOUNTER (OUTPATIENT)
Dept: LAB | Facility: MEDICAL CENTER | Age: 72
End: 2022-08-19
Attending: FAMILY MEDICINE
Payer: COMMERCIAL

## 2022-08-19 DIAGNOSIS — R71.8 ELEVATED MCV: ICD-10-CM

## 2022-08-19 LAB
FOLATE SERPL-MCNC: 27.4 NG/ML
HGB RETIC QN AUTO: 40.5 PG/CELL (ref 29–35)
IMM RETICS NFR: 11.5 % (ref 9.3–17.4)
RETICS # AUTO: 0.06 M/UL (ref 0.04–0.06)
RETICS/RBC NFR: 1.3 % (ref 0.8–2.1)
VIT B12 SERPL-MCNC: 766 PG/ML (ref 211–911)

## 2022-08-19 PROCEDURE — 82607 VITAMIN B-12: CPT

## 2022-08-19 PROCEDURE — 85046 RETICYTE/HGB CONCENTRATE: CPT

## 2022-08-19 PROCEDURE — 36415 COLL VENOUS BLD VENIPUNCTURE: CPT

## 2022-08-19 PROCEDURE — 82746 ASSAY OF FOLIC ACID SERUM: CPT

## 2022-08-29 ENCOUNTER — TELEPHONE (OUTPATIENT)
Dept: HEALTH INFORMATION MANAGEMENT | Facility: OTHER | Age: 72
End: 2022-08-29
Payer: COMMERCIAL
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
## 2022-08-29 NOTE — TELEPHONE ENCOUNTER
1. Caller Name: Heber Jacobs                         Call Back Number: [phone]       How would the patient prefer to be contacted with a response: MyChart message    2. SPECIFIC Action To Be Taken: Referral pending, please sign.    3. Diagnosis/Clinical Reason for Request:Left Lesser toe injury    4. Specialty & Provider Name/Lab/Imaging Location: Podiatry    5. Is appointment scheduled for requested order/referral: no    Patient was not informed they will receive a return phone call from the office ONLY if there are any questions before processing their request. Advised to call back if they haven't received a call from the referral department in 5 days.

## 2022-10-21 DIAGNOSIS — Z00.00 HEALTHCARE MAINTENANCE: ICD-10-CM

## 2022-10-21 DIAGNOSIS — K22.719 BARRETT'S ESOPHAGUS WITH DYSPLASIA: ICD-10-CM

## 2022-10-21 DIAGNOSIS — E78.5 DYSLIPIDEMIA: ICD-10-CM

## 2022-10-21 RX ORDER — SIMVASTATIN 20 MG
20 TABLET ORAL EVERY EVENING
Qty: 90 TABLET | Refills: 4 | Status: SHIPPED
Start: 2022-10-21 | End: 2023-07-17

## 2022-10-21 RX ORDER — OMEPRAZOLE 20 MG/1
20 CAPSULE, DELAYED RELEASE ORAL DAILY
Qty: 90 CAPSULE | Refills: 1 | Status: SHIPPED | OUTPATIENT
Start: 2022-10-21 | End: 2023-05-18 | Stop reason: SDUPTHER

## 2022-11-11 ENCOUNTER — OFFICE VISIT (OUTPATIENT)
Dept: MEDICAL GROUP | Facility: MEDICAL CENTER | Age: 72
End: 2022-11-11
Payer: COMMERCIAL

## 2022-11-11 ENCOUNTER — HOSPITAL ENCOUNTER (OUTPATIENT)
Dept: RADIOLOGY | Facility: MEDICAL CENTER | Age: 72
End: 2022-11-11
Attending: STUDENT IN AN ORGANIZED HEALTH CARE EDUCATION/TRAINING PROGRAM
Payer: COMMERCIAL

## 2022-11-11 VITALS
WEIGHT: 189.2 LBS | SYSTOLIC BLOOD PRESSURE: 136 MMHG | DIASTOLIC BLOOD PRESSURE: 64 MMHG | TEMPERATURE: 97.2 F | BODY MASS INDEX: 27.09 KG/M2 | HEART RATE: 72 BPM | OXYGEN SATURATION: 97 % | RESPIRATION RATE: 16 BRPM | HEIGHT: 70 IN

## 2022-11-11 DIAGNOSIS — M25.511 CHRONIC RIGHT SHOULDER PAIN: ICD-10-CM

## 2022-11-11 DIAGNOSIS — G89.29 CHRONIC RIGHT SHOULDER PAIN: ICD-10-CM

## 2022-11-11 DIAGNOSIS — N40.1 BENIGN PROSTATIC HYPERPLASIA WITH NOCTURIA: ICD-10-CM

## 2022-11-11 DIAGNOSIS — E78.5 DYSLIPIDEMIA: ICD-10-CM

## 2022-11-11 DIAGNOSIS — R35.1 BENIGN PROSTATIC HYPERPLASIA WITH NOCTURIA: ICD-10-CM

## 2022-11-11 PROCEDURE — 99214 OFFICE O/P EST MOD 30 MIN: CPT | Performed by: STUDENT IN AN ORGANIZED HEALTH CARE EDUCATION/TRAINING PROGRAM

## 2022-11-11 PROCEDURE — 73030 X-RAY EXAM OF SHOULDER: CPT | Mod: RT

## 2022-11-11 RX ORDER — TAMSULOSIN HYDROCHLORIDE 0.4 MG/1
0.4 CAPSULE ORAL
Qty: 90 CAPSULE | Refills: 3 | Status: SHIPPED | OUTPATIENT
Start: 2022-11-11 | End: 2023-11-13

## 2022-11-11 ASSESSMENT — ENCOUNTER SYMPTOMS
NAUSEA: 0
ABDOMINAL PAIN: 0
VOMITING: 0
CHILLS: 0
FEVER: 0
SHORTNESS OF BREATH: 0
COUGH: 0
FOCAL WEAKNESS: 0

## 2022-11-11 ASSESSMENT — FIBROSIS 4 INDEX: FIB4 SCORE: 1.57

## 2022-11-11 NOTE — PROGRESS NOTES
"Subjective:     CC: establish care    HPI:   Heber presents today with    Problem   Right Shoulder Pain    Chronic, for 2 years. Pain 2/10, stable.  No trauma, fall.  Exercises at gym.      Benign Prostatic Hyperplasia With Nocturia    Wakes up 3 times at night to go to bathroom.          Health Maintenance: Completed    ROS:  Review of Systems   Constitutional:  Negative for chills and fever.   Respiratory:  Negative for cough and shortness of breath.    Cardiovascular:  Negative for chest pain and leg swelling.   Gastrointestinal:  Negative for abdominal pain, nausea and vomiting.   Neurological:  Negative for focal weakness.     Objective:     Exam:  /64 (BP Location: Left arm, Patient Position: Sitting)   Pulse 72   Temp 36.2 °C (97.2 °F) (Temporal)   Resp 16   Ht 1.778 m (5' 10\")   Wt 85.8 kg (189 lb 3.2 oz)   SpO2 97%   BMI 27.15 kg/m²  Body mass index is 27.15 kg/m².    Physical Exam  Vitals reviewed.   HENT:      Head: Normocephalic.      Mouth/Throat:      Mouth: Mucous membranes are moist.      Pharynx: Oropharynx is clear.   Eyes:      Pupils: Pupils are equal, round, and reactive to light.   Cardiovascular:      Rate and Rhythm: Normal rate and regular rhythm.   Pulmonary:      Effort: Pulmonary effort is normal. No respiratory distress.      Breath sounds: No wheezing or rales.   Abdominal:      General: Abdomen is flat. Bowel sounds are normal. There is no distension.      Tenderness: There is no abdominal tenderness. There is no guarding.   Skin:     General: Skin is warm.   Neurological:      General: No focal deficit present.      Mental Status: He is alert and oriented to person, place, and time.     Labs: reviewed    Assessment & Plan:     72 y.o. male with the following -     1. Chronic right shoulder pain  Chronic, stable  - DX-SHOULDER 2+ RIGHT; Future    2. Benign prostatic hyperplasia with nocturia  Nocturia 3 x per night  - tamsulosin (FLOMAX) 0.4 MG capsule; Take 1 Capsule by " mouth 1/2 hour after breakfast.  Dispense: 90 Capsule; Refill: 3    3. Dyslipidemia  - continue simvastatin    I spent a total of 32 minutes with record review, exam, communication with the patient, communication with other providers, and documentation of this encounter.      Return in about 1 month (around 12/11/2022) for Med check, f/u shoulder XR, BPH.    Please note that this dictation was created using voice recognition software. I have made every reasonable attempt to correct obvious errors, but I expect that there are errors of grammar and possibly content that I did not discover before finalizing the note.

## 2022-12-15 ENCOUNTER — OFFICE VISIT (OUTPATIENT)
Dept: MEDICAL GROUP | Facility: MEDICAL CENTER | Age: 72
End: 2022-12-15
Payer: COMMERCIAL

## 2022-12-15 VITALS
RESPIRATION RATE: 14 BRPM | SYSTOLIC BLOOD PRESSURE: 122 MMHG | DIASTOLIC BLOOD PRESSURE: 72 MMHG | BODY MASS INDEX: 27.06 KG/M2 | WEIGHT: 189 LBS | HEIGHT: 70 IN | HEART RATE: 60 BPM | TEMPERATURE: 97 F | OXYGEN SATURATION: 98 %

## 2022-12-15 DIAGNOSIS — Z00.00 HEALTHCARE MAINTENANCE: ICD-10-CM

## 2022-12-15 DIAGNOSIS — R35.1 BENIGN PROSTATIC HYPERPLASIA WITH NOCTURIA: ICD-10-CM

## 2022-12-15 DIAGNOSIS — G89.29 CHRONIC RIGHT SHOULDER PAIN: ICD-10-CM

## 2022-12-15 DIAGNOSIS — M25.511 CHRONIC RIGHT SHOULDER PAIN: ICD-10-CM

## 2022-12-15 DIAGNOSIS — Z23 NEED FOR VACCINATION: ICD-10-CM

## 2022-12-15 DIAGNOSIS — N40.1 BENIGN PROSTATIC HYPERPLASIA WITH NOCTURIA: ICD-10-CM

## 2022-12-15 PROCEDURE — 99213 OFFICE O/P EST LOW 20 MIN: CPT | Mod: 25 | Performed by: STUDENT IN AN ORGANIZED HEALTH CARE EDUCATION/TRAINING PROGRAM

## 2022-12-15 PROCEDURE — 90471 IMMUNIZATION ADMIN: CPT | Performed by: STUDENT IN AN ORGANIZED HEALTH CARE EDUCATION/TRAINING PROGRAM

## 2022-12-15 PROCEDURE — 90746 HEPB VACCINE 3 DOSE ADULT IM: CPT | Performed by: STUDENT IN AN ORGANIZED HEALTH CARE EDUCATION/TRAINING PROGRAM

## 2022-12-15 ASSESSMENT — ENCOUNTER SYMPTOMS
CHILLS: 0
FOCAL WEAKNESS: 0
ABDOMINAL PAIN: 0
SHORTNESS OF BREATH: 0
VOMITING: 0
COUGH: 0
NAUSEA: 0
FEVER: 0

## 2022-12-15 ASSESSMENT — FIBROSIS 4 INDEX: FIB4 SCORE: 1.57

## 2022-12-15 NOTE — PROGRESS NOTES
"Subjective:     CC: follow up    HPI:   Heber presents today with    Problem   Right Shoulder Pain    Was . Left-handed.  Chronic, for 2 years. Pain 2/10, stable.  No trauma, fall.  Exercises at gym.      Benign Prostatic Hyperplasia With Nocturia    Last visit we started tamsulosin. Nocturia getting better, wakes up 2 times at night to go to bathroom now         Health Maintenance: Completed    ROS:  Review of Systems   Constitutional:  Negative for chills and fever.   Respiratory:  Negative for cough and shortness of breath.    Cardiovascular:  Negative for chest pain and leg swelling.   Gastrointestinal:  Negative for abdominal pain, nausea and vomiting.   Neurological:  Negative for focal weakness.     Objective:     Exam:  /72 (BP Location: Left arm, Patient Position: Sitting, BP Cuff Size: Adult)   Pulse 60   Temp 36.1 °C (97 °F) (Temporal)   Resp 14   Ht 1.778 m (5' 10\")   Wt 85.7 kg (189 lb)   SpO2 98%   BMI 27.12 kg/m²  Body mass index is 27.12 kg/m².    Physical Exam  Vitals reviewed.   HENT:      Head: Normocephalic.      Mouth/Throat:      Mouth: Mucous membranes are moist.      Pharynx: Oropharynx is clear.   Eyes:      Pupils: Pupils are equal, round, and reactive to light.   Cardiovascular:      Rate and Rhythm: Normal rate and regular rhythm.   Pulmonary:      Effort: Pulmonary effort is normal. No respiratory distress.      Breath sounds: No wheezing or rales.   Abdominal:      General: Abdomen is flat. Bowel sounds are normal. There is no distension.      Tenderness: There is no abdominal tenderness. There is no guarding.   Musculoskeletal:         General: Tenderness (right shoulder) present. No swelling or deformity.   Skin:     General: Skin is warm.   Neurological:      General: No focal deficit present.      Mental Status: He is alert and oriented to person, place, and time.       Labs: reviewed    Assessment & Plan:     72 y.o. male with the following -     1. " Chronic right shoulder pain  Patient reports that [facility] is helping. Offered PT and sports medicine referral. Patient refused, he want to continue his own physical exercises and to see.  We will follow up on next visit    2. Need for vaccination  - Hepatitis B Vaccine Adult 20+    3. Healthcare maintenance  Colonoscopy was done 1 year ago, was told due in 5 years  - Comp Metabolic Panel; Future  - Lipid Profile; Future  - HEMOGLOBIN A1C; Future  - PROSTATE SPECIFIC AG SCREENING; Future    4. Benign prostatic hyperplasia with nocturia  Continue current regimen, no side effects  - tamsulosin      Return in about 32 weeks (around 7/27/2023) for Annual wellness visit, F/u labs.    Please note that this dictation was created using voice recognition software. I have made every reasonable attempt to correct obvious errors, but I expect that there are errors of grammar and possibly content that I did not discover before finalizing the note.

## 2023-01-12 ENCOUNTER — NON-PROVIDER VISIT (OUTPATIENT)
Dept: MEDICAL GROUP | Facility: MEDICAL CENTER | Age: 73
End: 2023-01-12
Payer: COMMERCIAL

## 2023-01-12 DIAGNOSIS — Z23 NEED FOR VACCINATION: ICD-10-CM

## 2023-01-12 PROCEDURE — 99999 PR NO CHARGE: CPT | Performed by: STUDENT IN AN ORGANIZED HEALTH CARE EDUCATION/TRAINING PROGRAM

## 2023-01-12 PROCEDURE — 90471 IMMUNIZATION ADMIN: CPT | Performed by: STUDENT IN AN ORGANIZED HEALTH CARE EDUCATION/TRAINING PROGRAM

## 2023-01-12 PROCEDURE — 90746 HEPB VACCINE 3 DOSE ADULT IM: CPT | Performed by: STUDENT IN AN ORGANIZED HEALTH CARE EDUCATION/TRAINING PROGRAM

## 2023-01-12 NOTE — PROGRESS NOTES
"Bon Jacobs is a 72 y.o. male here for a non-provider visit for:   HEPATITIS B 2 of 2    Reason for immunization: lack of immunity demonstrated on prior labs or testing  Immunization records indicate need for vaccine: Yes, confirmed with [facility]  Minimum interval has been met for this vaccine: Yes  ABN completed: No    VIS Dated  [id number] was given to patient: Yes  All IAC Questionnaire questions were answered \"No.\"    Patient tolerated injection and no adverse effects were observed or reported: Yes    Pt scheduled for next dose in series: No    "

## 2023-05-09 ENCOUNTER — OFFICE VISIT (OUTPATIENT)
Dept: MEDICAL GROUP | Facility: MEDICAL CENTER | Age: 73
End: 2023-05-09
Payer: COMMERCIAL

## 2023-05-09 VITALS
SYSTOLIC BLOOD PRESSURE: 126 MMHG | HEART RATE: 78 BPM | BODY MASS INDEX: 27.35 KG/M2 | DIASTOLIC BLOOD PRESSURE: 68 MMHG | RESPIRATION RATE: 14 BRPM | TEMPERATURE: 98.2 F | HEIGHT: 70 IN | OXYGEN SATURATION: 96 % | WEIGHT: 191 LBS

## 2023-05-09 DIAGNOSIS — J02.0 STREP THROAT: ICD-10-CM

## 2023-05-09 DIAGNOSIS — R68.89 FLU-LIKE SYMPTOMS: ICD-10-CM

## 2023-05-09 LAB
EXTERNAL QUALITY CONTROL: NORMAL
FLUAV+FLUBV AG SPEC QL IA: NEGATIVE
INT CON NEG: NEGATIVE
INT CON POS: POSITIVE
S PYO AG THROAT QL: POSITIVE
SARS-COV+SARS-COV-2 AG RESP QL IA.RAPID: NEGATIVE

## 2023-05-09 PROCEDURE — 87426 SARSCOV CORONAVIRUS AG IA: CPT | Performed by: STUDENT IN AN ORGANIZED HEALTH CARE EDUCATION/TRAINING PROGRAM

## 2023-05-09 PROCEDURE — 99213 OFFICE O/P EST LOW 20 MIN: CPT | Performed by: STUDENT IN AN ORGANIZED HEALTH CARE EDUCATION/TRAINING PROGRAM

## 2023-05-09 PROCEDURE — 87804 INFLUENZA ASSAY W/OPTIC: CPT | Performed by: STUDENT IN AN ORGANIZED HEALTH CARE EDUCATION/TRAINING PROGRAM

## 2023-05-09 PROCEDURE — 87880 STREP A ASSAY W/OPTIC: CPT | Performed by: STUDENT IN AN ORGANIZED HEALTH CARE EDUCATION/TRAINING PROGRAM

## 2023-05-09 RX ORDER — AMOXICILLIN 500 MG/1
500 CAPSULE ORAL 2 TIMES DAILY
Qty: 20 CAPSULE | Refills: 0 | Status: SHIPPED | OUTPATIENT
Start: 2023-05-09 | End: 2023-05-19

## 2023-05-09 ASSESSMENT — ENCOUNTER SYMPTOMS
ABDOMINAL PAIN: 0
VOMITING: 0
FEVER: 0
COUGH: 1
HEADACHES: 0
MYALGIAS: 1
CHILLS: 1
FOCAL WEAKNESS: 0
NAUSEA: 0
SHORTNESS OF BREATH: 0
SPUTUM PRODUCTION: 1

## 2023-05-09 ASSESSMENT — FIBROSIS 4 INDEX: FIB4 SCORE: 1.59

## 2023-05-09 ASSESSMENT — PATIENT HEALTH QUESTIONNAIRE - PHQ9: CLINICAL INTERPRETATION OF PHQ2 SCORE: 0

## 2023-05-09 NOTE — PROGRESS NOTES
"Subjective:     CC: flu like symptoms    HPI:   Heber presents today with    Problem   Flu-Like Symptoms    Patient reports chills, congestion, cough, sore throat, fatigue for 5 days after getting massage in cold room.   Vaccinated for covid. No sick contacts. Drives school bus.     Strep Throat     ROS:  Review of Systems   Constitutional:  Positive for chills and malaise/fatigue. Negative for fever.   Respiratory:  Positive for cough and sputum production. Negative for shortness of breath.    Cardiovascular:  Negative for chest pain and leg swelling.   Gastrointestinal:  Negative for abdominal pain, nausea and vomiting.   Musculoskeletal:  Positive for myalgias.   Neurological:  Negative for focal weakness and headaches.     Objective:     Exam:  /68 (BP Location: Left arm, Patient Position: Sitting, BP Cuff Size: Adult)   Pulse 78   Temp 36.8 °C (98.2 °F) (Temporal)   Resp 14   Ht 1.778 m (5' 10\")   Wt 86.6 kg (191 lb)   SpO2 96%   BMI 27.41 kg/m²  Body mass index is 27.41 kg/m².    Physical Exam  Vitals reviewed.   HENT:      Head: Normocephalic.      Mouth/Throat:      Mouth: Mucous membranes are moist.      Pharynx: Oropharynx is clear.   Eyes:      Pupils: Pupils are equal, round, and reactive to light.   Cardiovascular:      Rate and Rhythm: Normal rate and regular rhythm.   Pulmonary:      Effort: Pulmonary effort is normal. No respiratory distress.      Breath sounds: No wheezing or rales.   Abdominal:      General: Abdomen is flat. Bowel sounds are normal. There is no distension.      Tenderness: There is no abdominal tenderness. There is no guarding.   Skin:     General: Skin is warm.   Neurological:      General: No focal deficit present.      Mental Status: He is alert and oriented to person, place, and time.       Assessment & Plan:     73 y.o. male with the following -     1. Flu-like symptoms  OTC Tylenol or Motrin for fever/discomfort.  OTC cough/cold medication for symptomatic " relief  OTC Supportive Care for Congestion - saline nasal spray or neti pot  Drink plenty of fluids  - POCT SARS-COV Antigen OLGA (Symptomatic only) - negative  - POCT Influenza A/B - negative  - POCT Rapid Strep A - positive    2. Strep throat  - POCT Rapid Strep A - positive  - amoxicillin (AMOXIL) 500 MG Cap; Take 1 Capsule by mouth 2 times a day for 10 days.  Dispense: 20 Capsule; Refill: 0      Return in about 2 months (around 7/17/2023).    Please note that this dictation was created using voice recognition software. I have made every reasonable attempt to correct obvious errors, but I expect that there are errors of grammar and possibly content that I did not discover before finalizing the note.

## 2023-05-15 ENCOUNTER — OFFICE VISIT (OUTPATIENT)
Dept: MEDICAL GROUP | Facility: MEDICAL CENTER | Age: 73
End: 2023-05-15
Payer: COMMERCIAL

## 2023-05-15 VITALS
BODY MASS INDEX: 27.49 KG/M2 | SYSTOLIC BLOOD PRESSURE: 122 MMHG | DIASTOLIC BLOOD PRESSURE: 64 MMHG | HEART RATE: 76 BPM | HEIGHT: 70 IN | TEMPERATURE: 99 F | OXYGEN SATURATION: 94 % | RESPIRATION RATE: 14 BRPM | WEIGHT: 192 LBS

## 2023-05-15 DIAGNOSIS — R05.1 ACUTE COUGH: ICD-10-CM

## 2023-05-15 PROCEDURE — 3074F SYST BP LT 130 MM HG: CPT | Performed by: STUDENT IN AN ORGANIZED HEALTH CARE EDUCATION/TRAINING PROGRAM

## 2023-05-15 PROCEDURE — 99213 OFFICE O/P EST LOW 20 MIN: CPT | Performed by: STUDENT IN AN ORGANIZED HEALTH CARE EDUCATION/TRAINING PROGRAM

## 2023-05-15 PROCEDURE — 3078F DIAST BP <80 MM HG: CPT | Performed by: STUDENT IN AN ORGANIZED HEALTH CARE EDUCATION/TRAINING PROGRAM

## 2023-05-15 RX ORDER — CODEINE PHOSPHATE/GUAIFENESIN 10-100MG/5
5 LIQUID (ML) ORAL 3 TIMES DAILY PRN
Qty: 120 ML | Refills: 0 | Status: SHIPPED | OUTPATIENT
Start: 2023-05-15 | End: 2023-05-22

## 2023-05-15 ASSESSMENT — ENCOUNTER SYMPTOMS
NAUSEA: 0
FEVER: 0
VOMITING: 0
SPUTUM PRODUCTION: 1
SHORTNESS OF BREATH: 0
CHILLS: 1
ABDOMINAL PAIN: 0
COUGH: 1
FOCAL WEAKNESS: 0

## 2023-05-15 ASSESSMENT — FIBROSIS 4 INDEX: FIB4 SCORE: 1.59

## 2023-05-15 NOTE — PROGRESS NOTES
"Subjective:     CC: cough    HPI:   Heber presents today with    Problem   Cough    For 4 nights reports severe cough.          ROS:  Review of Systems   Constitutional:  Positive for chills. Negative for fever.   Respiratory:  Positive for cough and sputum production. Negative for shortness of breath.    Cardiovascular:  Negative for chest pain and leg swelling.   Gastrointestinal:  Negative for abdominal pain, nausea and vomiting.   Neurological:  Negative for focal weakness.       Objective:     Exam:  /64 (BP Location: Right arm, Patient Position: Sitting, BP Cuff Size: Adult)   Pulse 76   Temp 37.2 °C (99 °F) (Temporal)   Resp 14   Ht 1.778 m (5' 10\")   Wt 87.1 kg (192 lb)   SpO2 94%   BMI 27.55 kg/m²  Body mass index is 27.55 kg/m².    Physical Exam  Vitals reviewed.   HENT:      Head: Normocephalic.      Mouth/Throat:      Mouth: Mucous membranes are moist.      Pharynx: Oropharynx is clear.   Eyes:      Pupils: Pupils are equal, round, and reactive to light.   Cardiovascular:      Rate and Rhythm: Normal rate and regular rhythm.   Pulmonary:      Effort: Pulmonary effort is normal. No respiratory distress.      Breath sounds: No wheezing or rales.   Abdominal:      General: Abdomen is flat. Bowel sounds are normal. There is no distension.      Tenderness: There is no abdominal tenderness. There is no guarding.   Skin:     General: Skin is warm.   Neurological:      General: No focal deficit present.      Mental Status: He is alert and oriented to person, place, and time.       Assessment & Plan:     73 y.o. male with the following -     1. Acute cough  Patient was diagnosed with strep throat last week, getting amoxicillin. Patient reports severe cough for 4 days, chest pain with cough, loss of smell and taste. COVID and influenza were negative.  Likely upper respiratory viral infection.   OTC Tylenol or Motrin for fever/discomfort.  OTC cough/cold medication for symptomatic relief  OTC " Supportive Care for Congestion - saline nasal spray or neti pot  Drink plenty of fluids  - complete course of amoxicillin  - guaifenesin-codeine (TUSSI-ORGANIDIN NR) 100-10 MG/5ML syrup; Take 5 mL by mouth 3 times a day as needed for Cough for up to 7 days.  Dispense: 120 mL; Refill: 0    No follow-ups on file.    Please note that this dictation was created using voice recognition software. I have made every reasonable attempt to correct obvious errors, but I expect that there are errors of grammar and possibly content that I did not discover before finalizing the note.

## 2023-05-18 DIAGNOSIS — K22.719 BARRETT'S ESOPHAGUS WITH DYSPLASIA: ICD-10-CM

## 2023-05-18 RX ORDER — OMEPRAZOLE 20 MG/1
20 CAPSULE, DELAYED RELEASE ORAL DAILY
Qty: 90 CAPSULE | Refills: 1 | Status: SHIPPED | OUTPATIENT
Start: 2023-05-18 | End: 2023-11-15 | Stop reason: SDUPTHER

## 2023-07-10 ENCOUNTER — HOSPITAL ENCOUNTER (OUTPATIENT)
Dept: LAB | Facility: MEDICAL CENTER | Age: 73
End: 2023-07-10
Attending: STUDENT IN AN ORGANIZED HEALTH CARE EDUCATION/TRAINING PROGRAM
Payer: COMMERCIAL

## 2023-07-10 DIAGNOSIS — Z00.00 HEALTHCARE MAINTENANCE: ICD-10-CM

## 2023-07-10 LAB
ALBUMIN SERPL BCP-MCNC: 4.5 G/DL (ref 3.2–4.9)
ALBUMIN/GLOB SERPL: 2 G/DL
ALP SERPL-CCNC: 68 U/L (ref 30–99)
ALT SERPL-CCNC: 17 U/L (ref 2–50)
ANION GAP SERPL CALC-SCNC: 11 MMOL/L (ref 7–16)
AST SERPL-CCNC: 21 U/L (ref 12–45)
BILIRUB SERPL-MCNC: 0.8 MG/DL (ref 0.1–1.5)
BUN SERPL-MCNC: 14 MG/DL (ref 8–22)
CALCIUM ALBUM COR SERPL-MCNC: 8.9 MG/DL (ref 8.5–10.5)
CALCIUM SERPL-MCNC: 9.3 MG/DL (ref 8.5–10.5)
CHLORIDE SERPL-SCNC: 106 MMOL/L (ref 96–112)
CHOLEST SERPL-MCNC: 172 MG/DL (ref 100–199)
CO2 SERPL-SCNC: 23 MMOL/L (ref 20–33)
CREAT SERPL-MCNC: 0.78 MG/DL (ref 0.5–1.4)
EST. AVERAGE GLUCOSE BLD GHB EST-MCNC: 103 MG/DL
GFR SERPLBLD CREATININE-BSD FMLA CKD-EPI: 95 ML/MIN/1.73 M 2
GLOBULIN SER CALC-MCNC: 2.2 G/DL (ref 1.9–3.5)
GLUCOSE SERPL-MCNC: 92 MG/DL (ref 65–99)
HBA1C MFR BLD: 5.2 % (ref 4–5.6)
HDLC SERPL-MCNC: 54 MG/DL
LDLC SERPL CALC-MCNC: 103 MG/DL
POTASSIUM SERPL-SCNC: 4 MMOL/L (ref 3.6–5.5)
PROT SERPL-MCNC: 6.7 G/DL (ref 6–8.2)
PSA SERPL-MCNC: 2.98 NG/ML (ref 0–4)
SODIUM SERPL-SCNC: 140 MMOL/L (ref 135–145)
TRIGL SERPL-MCNC: 76 MG/DL (ref 0–149)

## 2023-07-10 PROCEDURE — 80061 LIPID PANEL: CPT

## 2023-07-10 PROCEDURE — 36415 COLL VENOUS BLD VENIPUNCTURE: CPT

## 2023-07-10 PROCEDURE — 84153 ASSAY OF PSA TOTAL: CPT

## 2023-07-10 PROCEDURE — 83036 HEMOGLOBIN GLYCOSYLATED A1C: CPT

## 2023-07-10 PROCEDURE — 80053 COMPREHEN METABOLIC PANEL: CPT

## 2023-07-14 SDOH — ECONOMIC STABILITY: HOUSING INSECURITY: IN THE LAST 12 MONTHS, HOW MANY PLACES HAVE YOU LIVED?: 1

## 2023-07-14 SDOH — ECONOMIC STABILITY: FOOD INSECURITY: WITHIN THE PAST 12 MONTHS, THE FOOD YOU BOUGHT JUST DIDN'T LAST AND YOU DIDN'T HAVE MONEY TO GET MORE.: NEVER TRUE

## 2023-07-14 SDOH — HEALTH STABILITY: PHYSICAL HEALTH: ON AVERAGE, HOW MANY MINUTES DO YOU ENGAGE IN EXERCISE AT THIS LEVEL?: 90 MIN

## 2023-07-14 SDOH — HEALTH STABILITY: PHYSICAL HEALTH: ON AVERAGE, HOW MANY DAYS PER WEEK DO YOU ENGAGE IN MODERATE TO STRENUOUS EXERCISE (LIKE A BRISK WALK)?: 5 DAYS

## 2023-07-14 SDOH — ECONOMIC STABILITY: FOOD INSECURITY: WITHIN THE PAST 12 MONTHS, YOU WORRIED THAT YOUR FOOD WOULD RUN OUT BEFORE YOU GOT MONEY TO BUY MORE.: NEVER TRUE

## 2023-07-14 SDOH — ECONOMIC STABILITY: INCOME INSECURITY: IN THE LAST 12 MONTHS, WAS THERE A TIME WHEN YOU WERE NOT ABLE TO PAY THE MORTGAGE OR RENT ON TIME?: NO

## 2023-07-14 SDOH — ECONOMIC STABILITY: INCOME INSECURITY: HOW HARD IS IT FOR YOU TO PAY FOR THE VERY BASICS LIKE FOOD, HOUSING, MEDICAL CARE, AND HEATING?: NOT HARD AT ALL

## 2023-07-14 ASSESSMENT — SOCIAL DETERMINANTS OF HEALTH (SDOH)
HOW OFTEN DO YOU GET TOGETHER WITH FRIENDS OR RELATIVES?: ONCE A WEEK
HOW OFTEN DO YOU HAVE SIX OR MORE DRINKS ON ONE OCCASION: NEVER
WITHIN THE PAST 12 MONTHS, YOU WORRIED THAT YOUR FOOD WOULD RUN OUT BEFORE YOU GOT THE MONEY TO BUY MORE: NEVER TRUE
ARE YOU MARRIED, WIDOWED, DIVORCED, SEPARATED, NEVER MARRIED, OR LIVING WITH A PARTNER?: NEVER MARRIED
HOW OFTEN DO YOU ATTEND CHURCH OR RELIGIOUS SERVICES?: NEVER
DO YOU BELONG TO ANY CLUBS OR ORGANIZATIONS SUCH AS CHURCH GROUPS UNIONS, FRATERNAL OR ATHLETIC GROUPS, OR SCHOOL GROUPS?: YES
HOW HARD IS IT FOR YOU TO PAY FOR THE VERY BASICS LIKE FOOD, HOUSING, MEDICAL CARE, AND HEATING?: NOT HARD AT ALL
DO YOU BELONG TO ANY CLUBS OR ORGANIZATIONS SUCH AS CHURCH GROUPS UNIONS, FRATERNAL OR ATHLETIC GROUPS, OR SCHOOL GROUPS?: YES
HOW OFTEN DO YOU ATTENT MEETINGS OF THE CLUB OR ORGANIZATION YOU BELONG TO?: MORE THAN 4 TIMES PER YEAR
HOW OFTEN DO YOU HAVE A DRINK CONTAINING ALCOHOL: 2-4 TIMES A MONTH
IN A TYPICAL WEEK, HOW MANY TIMES DO YOU TALK ON THE PHONE WITH FAMILY, FRIENDS, OR NEIGHBORS?: THREE TIMES A WEEK
HOW OFTEN DO YOU GET TOGETHER WITH FRIENDS OR RELATIVES?: ONCE A WEEK
ARE YOU MARRIED, WIDOWED, DIVORCED, SEPARATED, NEVER MARRIED, OR LIVING WITH A PARTNER?: NEVER MARRIED
HOW MANY DRINKS CONTAINING ALCOHOL DO YOU HAVE ON A TYPICAL DAY WHEN YOU ARE DRINKING: 1 OR 2
IN A TYPICAL WEEK, HOW MANY TIMES DO YOU TALK ON THE PHONE WITH FAMILY, FRIENDS, OR NEIGHBORS?: THREE TIMES A WEEK
HOW OFTEN DO YOU ATTENT MEETINGS OF THE CLUB OR ORGANIZATION YOU BELONG TO?: MORE THAN 4 TIMES PER YEAR
HOW OFTEN DO YOU ATTEND CHURCH OR RELIGIOUS SERVICES?: NEVER

## 2023-07-14 ASSESSMENT — LIFESTYLE VARIABLES
HOW MANY STANDARD DRINKS CONTAINING ALCOHOL DO YOU HAVE ON A TYPICAL DAY: 1 OR 2
HOW OFTEN DO YOU HAVE SIX OR MORE DRINKS ON ONE OCCASION: NEVER
AUDIT-C TOTAL SCORE: 2
SKIP TO QUESTIONS 9-10: 1
HOW OFTEN DO YOU HAVE A DRINK CONTAINING ALCOHOL: 2-4 TIMES A MONTH

## 2023-07-17 ENCOUNTER — OFFICE VISIT (OUTPATIENT)
Dept: MEDICAL GROUP | Facility: MEDICAL CENTER | Age: 73
End: 2023-07-17
Payer: COMMERCIAL

## 2023-07-17 VITALS
SYSTOLIC BLOOD PRESSURE: 122 MMHG | DIASTOLIC BLOOD PRESSURE: 64 MMHG | RESPIRATION RATE: 14 BRPM | WEIGHT: 195 LBS | BODY MASS INDEX: 27.92 KG/M2 | HEART RATE: 72 BPM | HEIGHT: 70 IN | OXYGEN SATURATION: 94 % | TEMPERATURE: 97.2 F

## 2023-07-17 DIAGNOSIS — E78.5 DYSLIPIDEMIA: ICD-10-CM

## 2023-07-17 DIAGNOSIS — Z00.00 HEALTHCARE MAINTENANCE: ICD-10-CM

## 2023-07-17 DIAGNOSIS — Z23 NEED FOR VACCINATION: ICD-10-CM

## 2023-07-17 DIAGNOSIS — Z11.59 NEED FOR HEPATITIS C SCREENING TEST: ICD-10-CM

## 2023-07-17 PROCEDURE — G0439 PPPS, SUBSEQ VISIT: HCPCS | Mod: 25 | Performed by: STUDENT IN AN ORGANIZED HEALTH CARE EDUCATION/TRAINING PROGRAM

## 2023-07-17 PROCEDURE — 90746 HEPB VACCINE 3 DOSE ADULT IM: CPT | Performed by: STUDENT IN AN ORGANIZED HEALTH CARE EDUCATION/TRAINING PROGRAM

## 2023-07-17 PROCEDURE — 3078F DIAST BP <80 MM HG: CPT | Performed by: STUDENT IN AN ORGANIZED HEALTH CARE EDUCATION/TRAINING PROGRAM

## 2023-07-17 PROCEDURE — 90471 IMMUNIZATION ADMIN: CPT | Performed by: STUDENT IN AN ORGANIZED HEALTH CARE EDUCATION/TRAINING PROGRAM

## 2023-07-17 PROCEDURE — 3074F SYST BP LT 130 MM HG: CPT | Performed by: STUDENT IN AN ORGANIZED HEALTH CARE EDUCATION/TRAINING PROGRAM

## 2023-07-17 RX ORDER — SIMVASTATIN 40 MG
40 TABLET ORAL EVERY EVENING
Qty: 30 TABLET | Refills: 1 | Status: SHIPPED | OUTPATIENT
Start: 2023-07-17 | End: 2023-08-16

## 2023-07-17 ASSESSMENT — FIBROSIS 4 INDEX: FIB4 SCORE: 1.76

## 2023-07-17 NOTE — PROGRESS NOTES
Subjective:     CC:   Chief Complaint   Patient presents with    Annual Exam       HPI:   Heber Jacobs is a 73 y.o. male who presents for an annual exam. He is feeling well and has no complaints.    Health Maintenance  Advanced directive: has living, bring we scan  PT/vit D for falls prevention: taking vit D  Cholesterol Screening: done, on simvastatin  Diabetes Screening: done, HbA1C normal  AAA Screening: never smoked  Aspirin Use: not indicated  Diet: BMI ?25 diet & physical activity counseling. Done   Exercise: BMI ?25 diet & physical activity counseling. Done   Substance Abuse: counseling if alcohol misuse. Done  Safe in relationship. Lives alone  Seat belts, bike helmet, gun safety discussed.  Sun protection used.    Cancer screening  Colorectal Cancer Screening: colonoscopy done 1.5 year ago. Due to   Lung Cancer Screening: never smoker  Prostate Cancer Screening/PSA: normal     Infectious disease screening/Immunizations  --STI Screening: not indicated  --Practices safe sex.  --HIV Screening: not indicated  --Hepatitis C Screening: ordered   --Immunizations:    Influenza: gets at the beginning of season   HPV:  not indicated   Tetanus: in 2018   Shingles: vaccinated   Pneumococcal : vaccinated   Hepatitis B: vaccinated  COVID-19: vaccinated  Other immunizations: N/A    He  has a past medical history of Allergy, Arthritis, Downey esophagus, GERD (gastroesophageal reflux disease), Hemorrhoid, Hyperlipidemia, and Nocturia.  He  has a past surgical history that includes knee arthroscopy (2013); exploratory laparotomy; and hernia repair.  Family History   Problem Relation Age of Onset    Breast Cancer Mother     Tubal Cancer Mother     Ovarian Cancer Mother     Cancer Mother         breast cancer    Heart Disease Father     Cancer Father         intestine cancer    Cancer Brother          of leukemia at age 3    Peritoneal Cancer Neg Hx     Colorectal Cancer Neg Hx      Social History     Tobacco  Use    Smoking status: Never    Smokeless tobacco: Never    Tobacco comments:     n/a   Vaping Use    Vaping Use: Never used   Substance Use Topics    Alcohol use: Yes     Alcohol/week: 1.8 oz     Types: 1 Glasses of wine, 1 Cans of beer, 1 Standard drinks or equivalent per week     Comment: 2-3 drinks a month    Drug use: Never       Patient Active Problem List    Diagnosis Date Noted    Flu-like symptoms 05/09/2023    Strep throat 05/09/2023    Closed fracture of phalanx of lesser toe of left foot 08/12/2022    Elevated MCV 08/12/2022    Pain of toe of left foot 07/26/2022    Venous insufficiency 07/08/2022    Seborrheic keratosis 07/08/2022    Hip pain 07/08/2022    Right shoulder pain 07/08/2022    Cough 05/31/2022    Rash 05/31/2022    Neck pain 07/06/2020    Anxiety 03/20/2020    Right knee pain 07/01/2019    Parmar's neuroma of right foot 04/29/2019    Benign prostatic hyperplasia with nocturia 02/07/2018    Erectile dysfunction 02/07/2018    Other hemorrhoids 02/07/2018    Healthcare maintenance 02/07/2018    Downey esophagus 02/07/2018    Left-sided low back pain without sciatica 06/21/2016    Spastic intestine 07/23/2015    Other male erectile dysfunction 07/23/2015    Dyslipidemia 07/21/2015       Current Outpatient Medications   Medication Sig Dispense Refill    Hyoscyamine Sulfate (HYOSCYAMINE PO) Take  by mouth. Once every 4 months      Ascorbic Acid (VITAMIN C PO)       Cholecalciferol (VITAMIN D3 PO)       simvastatin (ZOCOR) 40 MG Tab Take 1 Tablet by mouth every evening for 30 days. 30 Tablet 1    omeprazole (PRILOSEC) 20 MG delayed-release capsule Take 1 Capsule by mouth every day. 90 Capsule 1    tamsulosin (FLOMAX) 0.4 MG capsule Take 1 Capsule by mouth 1/2 hour after breakfast. 90 Capsule 3    Fiber Powder Take 1 Each by mouth every day.      therapeutic multivitamin-minerals (THERAGRAN-M) Tab Take 1 Tab by mouth every day. 30 Tab 11    Omega-3 Fatty Acids (FISH OIL) 1200 MG Cap Take  by  "mouth.       No current facility-administered medications for this visit.    (including changes today)  Allergies: Patient has no known allergies.    Review of Systems   Constitutional: Negative for fever, chills and malaise/fatigue.   HENT: Negative for congestion.    Eyes: Negative for pain.   Respiratory: Negative for cough and shortness of breath.    Cardiovascular: Negative for leg swelling.   Gastrointestinal: Negative for nausea, vomiting, abdominal pain and diarrhea.   Genitourinary: Negative for dysuria and hematuria.   Skin: Negative for rash.   Neurological: Negative for dizziness, focal weakness and headaches.   Endo/Heme/Allergies: Does not bleed easily.   Psychiatric/Behavioral: Negative for depression.  The patient is not nervous/anxious.      Objective:     /64 (BP Location: Right arm, Patient Position: Sitting, BP Cuff Size: Adult)   Pulse 72   Temp 36.2 °C (97.2 °F) (Temporal)   Resp 14   Ht 1.778 m (5' 10\")   Wt 88.5 kg (195 lb)   SpO2 94%   BMI 27.98 kg/m²   Body mass index is 27.98 kg/m².  Wt Readings from Last 4 Encounters:   07/17/23 88.5 kg (195 lb)   05/15/23 87.1 kg (192 lb)   05/09/23 86.6 kg (191 lb)   12/15/22 85.7 kg (189 lb)       Physical Exam:  Constitutional: Well-developed and well-nourished. Not diaphoretic. No distress.   Skin: Skin is warm and dry. No rash noted.  Head: Atraumatic without lesions.  Eyes: Conjunctivae and extraocular motions are normal. Pupils are equal, round, and reactive to light. No scleral icterus.   Nose: Nares patent. Septum midline. Turbinates without erythema nor edema. No discharge.   Mouth/Throat: Dentition is good. Tongue normal. Oropharynx is clear and moist. Posterior pharynx without erythema or exudates.  Neck: Supple, trachea midline. Normal range of motion. No thyromegaly present. No lymphadenopathy--cervical or supraclavicular.  Cardiovascular: Regular rate and rhythm, S1 and S2 without murmur, rubs, or gallops.    Lungs: Effort " normal. Clear to auscultation throughout. No adventitious sounds. No CVA tenderness.  Abdomen: Soft, non tender, and without distention. Active bowel sounds in all four quadrants. No rebound, guarding, masses or HSM.  Extremities: No cyanosis, clubbing, erythema, nor edema. Distal pulses intact and symmetric.   Musculoskeletal: All major joints AROM full in all directions without pain.  Neurological: Alert and oriented x 3. DTRs 2+/3 and symmetric. No cranial nerve deficit. 5/5 myotomes. Sensation intact.  Psychiatric:  Behavior, mood, and affect are appropriate.      Assessment and Plan:     1. Healthcare maintenance  VITAMIN D,25 HYDROXY (DEFICIENCY)      2. Need for hepatitis C screening test  HEP C VIRUS ANTIBODY      3. Need for vaccination  Hep B Adult 20+      4. Dyslipidemia  simvastatin (ZOCOR) 40 MG Tab    Lipid Profile        Increased simvastatin from 20 mg to 40 mg. Will follow up with lab in 6 weeks    Labs per orders.  Vaccinations per orders.  Counseling about diet, supplements, exercise, skin care and safe sex.      Follow-up: Return in about 6 weeks (around 8/28/2023) for F/u labs.

## 2023-08-21 ENCOUNTER — HOSPITAL ENCOUNTER (OUTPATIENT)
Dept: LAB | Facility: MEDICAL CENTER | Age: 73
End: 2023-08-21
Attending: STUDENT IN AN ORGANIZED HEALTH CARE EDUCATION/TRAINING PROGRAM
Payer: COMMERCIAL

## 2023-08-21 DIAGNOSIS — E78.5 DYSLIPIDEMIA: ICD-10-CM

## 2023-08-21 DIAGNOSIS — Z00.00 HEALTHCARE MAINTENANCE: ICD-10-CM

## 2023-08-21 DIAGNOSIS — Z11.59 NEED FOR HEPATITIS C SCREENING TEST: ICD-10-CM

## 2023-08-21 LAB
25(OH)D3 SERPL-MCNC: 42 NG/ML (ref 30–100)
CHOLEST SERPL-MCNC: 156 MG/DL (ref 100–199)
FASTING STATUS PATIENT QL REPORTED: NORMAL
HCV AB SER QL: NORMAL
HDLC SERPL-MCNC: 53 MG/DL
LDLC SERPL CALC-MCNC: 92 MG/DL
TRIGL SERPL-MCNC: 57 MG/DL (ref 0–149)

## 2023-08-21 PROCEDURE — 36415 COLL VENOUS BLD VENIPUNCTURE: CPT

## 2023-08-21 PROCEDURE — 80061 LIPID PANEL: CPT

## 2023-08-21 PROCEDURE — 82306 VITAMIN D 25 HYDROXY: CPT

## 2023-08-21 PROCEDURE — 86803 HEPATITIS C AB TEST: CPT

## 2023-08-28 ENCOUNTER — OFFICE VISIT (OUTPATIENT)
Dept: MEDICAL GROUP | Facility: MEDICAL CENTER | Age: 73
End: 2023-08-28
Payer: COMMERCIAL

## 2023-08-28 VITALS
SYSTOLIC BLOOD PRESSURE: 120 MMHG | TEMPERATURE: 97.7 F | BODY MASS INDEX: 27.35 KG/M2 | RESPIRATION RATE: 14 BRPM | OXYGEN SATURATION: 99 % | WEIGHT: 191 LBS | DIASTOLIC BLOOD PRESSURE: 72 MMHG | HEART RATE: 63 BPM | HEIGHT: 70 IN

## 2023-08-28 DIAGNOSIS — E78.5 DYSLIPIDEMIA: ICD-10-CM

## 2023-08-28 PROCEDURE — 99213 OFFICE O/P EST LOW 20 MIN: CPT | Performed by: STUDENT IN AN ORGANIZED HEALTH CARE EDUCATION/TRAINING PROGRAM

## 2023-08-28 PROCEDURE — 3078F DIAST BP <80 MM HG: CPT | Performed by: STUDENT IN AN ORGANIZED HEALTH CARE EDUCATION/TRAINING PROGRAM

## 2023-08-28 PROCEDURE — 3074F SYST BP LT 130 MM HG: CPT | Performed by: STUDENT IN AN ORGANIZED HEALTH CARE EDUCATION/TRAINING PROGRAM

## 2023-08-28 RX ORDER — SIMVASTATIN 40 MG
40 TABLET ORAL NIGHTLY
Qty: 90 TABLET | Refills: 3 | Status: SHIPPED | OUTPATIENT
Start: 2023-08-28 | End: 2023-09-26

## 2023-08-28 ASSESSMENT — ENCOUNTER SYMPTOMS
ABDOMINAL PAIN: 0
FOCAL WEAKNESS: 0
NAUSEA: 0
CHILLS: 0
FEVER: 0
COUGH: 0
VOMITING: 0
SHORTNESS OF BREATH: 0

## 2023-08-28 ASSESSMENT — FIBROSIS 4 INDEX: FIB4 SCORE: 1.76

## 2023-08-28 NOTE — PROGRESS NOTES
"Subjective:     CC: follow up lab    HPI:   Heber presents today with    Problem   Dyslipidemia     Latest Reference Range & Units 07/10/23 09:45 08/21/23 06:13   Cholesterol,Tot 100 - 199 mg/dL 172 156   Triglycerides 0 - 149 mg/dL 76 57   HDL >=40 mg/dL 54 53   LDL <100 mg/dL 103 (H) 92   (H): Data is abnormally high       ROS:  Review of Systems   Constitutional:  Negative for chills and fever.   Respiratory:  Negative for cough and shortness of breath.    Cardiovascular:  Negative for chest pain and leg swelling.   Gastrointestinal:  Negative for abdominal pain, nausea and vomiting.   Neurological:  Negative for focal weakness.       Objective:     Exam:  /72 (BP Location: Right arm, Patient Position: Sitting, BP Cuff Size: Adult)   Pulse 63   Temp 36.5 °C (97.7 °F) (Temporal)   Resp 14   Ht 1.778 m (5' 10\")   Wt 86.6 kg (191 lb)   SpO2 99%   BMI 27.41 kg/m²  Body mass index is 27.41 kg/m².    Physical Exam  Vitals reviewed.   HENT:      Head: Normocephalic.      Mouth/Throat:      Mouth: Mucous membranes are moist.      Pharynx: Oropharynx is clear.   Eyes:      Pupils: Pupils are equal, round, and reactive to light.   Cardiovascular:      Rate and Rhythm: Normal rate and regular rhythm.   Pulmonary:      Effort: Pulmonary effort is normal. No respiratory distress.      Breath sounds: No wheezing or rales.   Abdominal:      General: Abdomen is flat. Bowel sounds are normal. There is no distension.      Tenderness: There is no abdominal tenderness. There is no guarding.   Skin:     General: Skin is warm.   Neurological:      General: No focal deficit present.      Mental Status: He is alert and oriented to person, place, and time.       Labs: reviewed    Assessment & Plan:     73 y.o. male with the following -     1. Dyslipidemia  Chronic, now well controlled. No side effects of simvastatin  - simvastatin (ZOCOR) 40 MG Tab; Take 1 Tablet by mouth every evening for 90 days.  Dispense: 90 Tablet; " Refill: 3      No follow-ups on file. Patient needs to establish with new PCP    Please note that this dictation was created using voice recognition software. I have made every reasonable attempt to correct obvious errors, but I expect that there are errors of grammar and possibly content that I did not discover before finalizing the note.

## 2023-09-19 ENCOUNTER — OFFICE VISIT (OUTPATIENT)
Dept: MEDICAL GROUP | Facility: MEDICAL CENTER | Age: 73
End: 2023-09-19
Payer: COMMERCIAL

## 2023-09-19 VITALS
HEART RATE: 74 BPM | TEMPERATURE: 97.3 F | WEIGHT: 191 LBS | SYSTOLIC BLOOD PRESSURE: 120 MMHG | HEIGHT: 70 IN | DIASTOLIC BLOOD PRESSURE: 72 MMHG | BODY MASS INDEX: 27.35 KG/M2 | OXYGEN SATURATION: 100 % | RESPIRATION RATE: 14 BRPM

## 2023-09-19 DIAGNOSIS — Z23 NEED FOR VACCINATION: ICD-10-CM

## 2023-09-19 DIAGNOSIS — M54.50 ACUTE LEFT-SIDED LOW BACK PAIN WITHOUT SCIATICA: ICD-10-CM

## 2023-09-19 PROCEDURE — 90662 IIV NO PRSV INCREASED AG IM: CPT | Performed by: STUDENT IN AN ORGANIZED HEALTH CARE EDUCATION/TRAINING PROGRAM

## 2023-09-19 PROCEDURE — 3074F SYST BP LT 130 MM HG: CPT | Performed by: STUDENT IN AN ORGANIZED HEALTH CARE EDUCATION/TRAINING PROGRAM

## 2023-09-19 PROCEDURE — 99213 OFFICE O/P EST LOW 20 MIN: CPT | Mod: 25 | Performed by: STUDENT IN AN ORGANIZED HEALTH CARE EDUCATION/TRAINING PROGRAM

## 2023-09-19 PROCEDURE — 3078F DIAST BP <80 MM HG: CPT | Performed by: STUDENT IN AN ORGANIZED HEALTH CARE EDUCATION/TRAINING PROGRAM

## 2023-09-19 PROCEDURE — 90471 IMMUNIZATION ADMIN: CPT | Performed by: STUDENT IN AN ORGANIZED HEALTH CARE EDUCATION/TRAINING PROGRAM

## 2023-09-19 ASSESSMENT — ENCOUNTER SYMPTOMS
FEVER: 0
WEIGHT LOSS: 0
BACK PAIN: 1
CHILLS: 0
ABDOMINAL PAIN: 0
TINGLING: 0
FOCAL WEAKNESS: 0
NAUSEA: 0
COUGH: 0
SHORTNESS OF BREATH: 0
VOMITING: 0

## 2023-09-19 ASSESSMENT — FIBROSIS 4 INDEX: FIB4 SCORE: 1.76

## 2023-09-19 NOTE — PROGRESS NOTES
"Subjective:     CC: back pain    HPI:   Heber presents today with    Problem   Acute Left-Sided Low Back Pain    Reports low left back pain since yesterday, 4/10, constant, feels like \"punched\". Radiating to left gluteal area with certain movements. No weakness on legs, no numbness, tingling. Also reports low energy, tiredness. 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 No fall, trauma.  Had similar pain 1 year ago.         Health Maintenance: Completed    ROS:  Review of Systems   Constitutional:  Positive for malaise/fatigue. Negative for chills, fever and weight loss.   Respiratory:  Negative for cough and shortness of breath.    Cardiovascular:  Negative for chest pain and leg swelling.   Gastrointestinal:  Negative for abdominal pain, nausea and vomiting.   Musculoskeletal:  Positive for back pain.   Skin:  Negative for rash.   Neurological:  Negative for tingling and focal weakness.       Objective:     Exam:  /72 (BP Location: Left arm, Patient Position: Sitting, BP Cuff Size: Adult)   Pulse 74   Temp 36.3 °C (97.3 °F) (Temporal)   Resp 14   Ht 1.778 m (5' 10\")   Wt 86.6 kg (191 lb)   SpO2 100%   BMI 27.41 kg/m²  Body mass index is 27.41 kg/m².    Physical Exam  Vitals reviewed.   HENT:      Head: Normocephalic.      Mouth/Throat:      Mouth: Mucous membranes are moist.      Pharynx: Oropharynx is clear.   Eyes:      Pupils: Pupils are equal, round, and reactive to light.   Cardiovascular:      Rate and Rhythm: Normal rate and regular rhythm.   Pulmonary:      Effort: Pulmonary effort is normal. No respiratory distress.      Breath sounds: No wheezing or rales.   Abdominal:      General: Abdomen is flat. Bowel sounds are normal. There is no distension.      Tenderness: There is no abdominal tenderness. There is no guarding.   Musculoskeletal:         General: Tenderness (left lower back) present.   Skin:     General: Skin is warm.   Neurological:      " General: No focal deficit present.      Mental Status: He is alert and oriented to person, place, and time.       Labs: reviewed    Assessment & Plan:     73 y.o. male with the following -     1. Acute left-sided low back pain without sciatica  Looks like muscle sprain.   - OTC tylenol, alleve as needed  - Referral to Physical Therapy    2. Need for vaccination  - Influenza Vaccine, High Dose (65+ Only)      Return if symptoms worsen or fail to improve.    Please note that this dictation was created using voice recognition software. I have made every reasonable attempt to correct obvious errors, but I expect that there are errors of grammar and possibly content that I did not discover before finalizing the note.

## 2023-09-25 DIAGNOSIS — E78.5 DYSLIPIDEMIA: ICD-10-CM

## 2023-09-26 RX ORDER — SIMVASTATIN 40 MG
40 TABLET ORAL EVERY EVENING
Qty: 30 TABLET | Refills: 0 | Status: SHIPPED | OUTPATIENT
Start: 2023-09-26

## 2023-11-02 ENCOUNTER — DOCUMENTATION (OUTPATIENT)
Dept: HEALTH INFORMATION MANAGEMENT | Facility: OTHER | Age: 73
End: 2023-11-02
Payer: COMMERCIAL

## 2023-11-09 ENCOUNTER — APPOINTMENT (RX ONLY)
Dept: URBAN - METROPOLITAN AREA CLINIC 6 | Facility: CLINIC | Age: 73
Setting detail: DERMATOLOGY
End: 2023-11-09

## 2023-11-09 DIAGNOSIS — Z71.89 OTHER SPECIFIED COUNSELING: ICD-10-CM

## 2023-11-09 DIAGNOSIS — Q82.5 CONGENITAL NON-NEOPLASTIC NEVUS: ICD-10-CM

## 2023-11-09 DIAGNOSIS — D18.0 HEMANGIOMA: ICD-10-CM

## 2023-11-09 DIAGNOSIS — L89 PRESSURE ULCER: ICD-10-CM

## 2023-11-09 DIAGNOSIS — L82.1 OTHER SEBORRHEIC KERATOSIS: ICD-10-CM

## 2023-11-09 DIAGNOSIS — D22 MELANOCYTIC NEVI: ICD-10-CM

## 2023-11-09 DIAGNOSIS — L81.4 OTHER MELANIN HYPERPIGMENTATION: ICD-10-CM

## 2023-11-09 PROBLEM — L89.109 PRESSURE ULCER OF UNSPECIFIED PART OF BACK, UNSPECIFIED STAGE: Status: ACTIVE | Noted: 2023-11-09

## 2023-11-09 PROBLEM — D22.61 MELANOCYTIC NEVI OF RIGHT UPPER LIMB, INCLUDING SHOULDER: Status: ACTIVE | Noted: 2023-11-09

## 2023-11-09 PROBLEM — D18.01 HEMANGIOMA OF SKIN AND SUBCUTANEOUS TISSUE: Status: ACTIVE | Noted: 2023-11-09

## 2023-11-09 PROBLEM — D22.62 MELANOCYTIC NEVI OF LEFT UPPER LIMB, INCLUDING SHOULDER: Status: ACTIVE | Noted: 2023-11-09

## 2023-11-09 PROBLEM — D22.71 MELANOCYTIC NEVI OF RIGHT LOWER LIMB, INCLUDING HIP: Status: ACTIVE | Noted: 2023-11-09

## 2023-11-09 PROBLEM — D22.72 MELANOCYTIC NEVI OF LEFT LOWER LIMB, INCLUDING HIP: Status: ACTIVE | Noted: 2023-11-09

## 2023-11-09 PROBLEM — D22.5 MELANOCYTIC NEVI OF TRUNK: Status: ACTIVE | Noted: 2023-11-09

## 2023-11-09 PROCEDURE — ? COUNSELING

## 2023-11-09 PROCEDURE — 99213 OFFICE O/P EST LOW 20 MIN: CPT

## 2023-11-09 ASSESSMENT — LOCATION DETAILED DESCRIPTION DERM
LOCATION DETAILED: RIGHT PROXIMAL DORSAL FOREARM
LOCATION DETAILED: LEFT ANTERIOR PROXIMAL THIGH
LOCATION DETAILED: RIGHT DISTAL POSTERIOR THIGH
LOCATION DETAILED: RIGHT MEDIAL INFERIOR CHEST
LOCATION DETAILED: RIGHT PROXIMAL CALF
LOCATION DETAILED: LEFT ANTERIOR DISTAL THIGH
LOCATION DETAILED: LEFT PROXIMAL CALF
LOCATION DETAILED: RIGHT SUPERIOR LATERAL MIDBACK
LOCATION DETAILED: SACRAL SPINE
LOCATION DETAILED: RIGHT ANTERIOR PROXIMAL THIGH
LOCATION DETAILED: LEFT CENTRAL MALAR CHEEK
LOCATION DETAILED: LEFT VENTRAL DISTAL FOREARM
LOCATION DETAILED: RIGHT VENTRAL PROXIMAL FOREARM
LOCATION DETAILED: RIGHT VENTRAL DISTAL FOREARM
LOCATION DETAILED: RIGHT ANTERIOR DISTAL UPPER ARM
LOCATION DETAILED: LEFT DISTAL POSTERIOR THIGH
LOCATION DETAILED: LEFT ANTERIOR DISTAL UPPER ARM
LOCATION DETAILED: LEFT PROXIMAL DORSAL FOREARM
LOCATION DETAILED: RIGHT MID-UPPER BACK
LOCATION DETAILED: LEFT INFERIOR UPPER BACK

## 2023-11-09 ASSESSMENT — LOCATION SIMPLE DESCRIPTION DERM
LOCATION SIMPLE: RIGHT THIGH
LOCATION SIMPLE: RIGHT UPPER ARM
LOCATION SIMPLE: RIGHT LOWER BACK
LOCATION SIMPLE: RIGHT FOREARM
LOCATION SIMPLE: LOWER BACK
LOCATION SIMPLE: RIGHT UPPER BACK
LOCATION SIMPLE: RIGHT CALF
LOCATION SIMPLE: RIGHT POSTERIOR THIGH
LOCATION SIMPLE: LEFT FOREARM
LOCATION SIMPLE: LEFT CHEEK
LOCATION SIMPLE: LEFT THIGH
LOCATION SIMPLE: LEFT POSTERIOR THIGH
LOCATION SIMPLE: LEFT UPPER ARM
LOCATION SIMPLE: LEFT UPPER BACK
LOCATION SIMPLE: LEFT CALF
LOCATION SIMPLE: CHEST

## 2023-11-09 ASSESSMENT — LOCATION ZONE DERM
LOCATION ZONE: ARM
LOCATION ZONE: TRUNK
LOCATION ZONE: FACE
LOCATION ZONE: LEG

## 2023-11-09 NOTE — PROCEDURE: COUNSELING
Detail Level: Detailed
Detail Level: Zone
Patient Specific Counseling (Will Not Stick From Patient To Patient): OTC desitin prn

## 2023-11-13 DIAGNOSIS — N40.1 BENIGN PROSTATIC HYPERPLASIA WITH NOCTURIA: ICD-10-CM

## 2023-11-13 DIAGNOSIS — R35.1 BENIGN PROSTATIC HYPERPLASIA WITH NOCTURIA: ICD-10-CM

## 2023-11-13 RX ORDER — TAMSULOSIN HYDROCHLORIDE 0.4 MG/1
CAPSULE ORAL
Qty: 90 CAPSULE | Refills: 3 | Status: SHIPPED | OUTPATIENT
Start: 2023-11-13

## 2023-11-15 DIAGNOSIS — K22.719 BARRETT'S ESOPHAGUS WITH DYSPLASIA: ICD-10-CM

## 2023-11-16 RX ORDER — OMEPRAZOLE 20 MG/1
20 CAPSULE, DELAYED RELEASE ORAL DAILY
Qty: 90 CAPSULE | Refills: 1 | Status: SHIPPED | OUTPATIENT
Start: 2023-11-16

## 2024-01-08 ENCOUNTER — OFFICE VISIT (OUTPATIENT)
Dept: MEDICAL GROUP | Facility: MEDICAL CENTER | Age: 74
End: 2024-01-08
Payer: COMMERCIAL

## 2024-01-08 VITALS
BODY MASS INDEX: 28.46 KG/M2 | OXYGEN SATURATION: 99 % | WEIGHT: 198.8 LBS | TEMPERATURE: 97.3 F | SYSTOLIC BLOOD PRESSURE: 122 MMHG | HEIGHT: 70 IN | DIASTOLIC BLOOD PRESSURE: 72 MMHG | HEART RATE: 64 BPM

## 2024-01-08 DIAGNOSIS — M25.521 RIGHT ELBOW PAIN: Primary | ICD-10-CM

## 2024-01-08 DIAGNOSIS — R07.0 THROAT PAIN: ICD-10-CM

## 2024-01-08 DIAGNOSIS — R35.1 BENIGN PROSTATIC HYPERPLASIA WITH NOCTURIA: ICD-10-CM

## 2024-01-08 DIAGNOSIS — N40.1 BENIGN PROSTATIC HYPERPLASIA WITH NOCTURIA: ICD-10-CM

## 2024-01-08 DIAGNOSIS — K22.719 BARRETT'S ESOPHAGUS WITH DYSPLASIA: ICD-10-CM

## 2024-01-08 DIAGNOSIS — E78.5 DYSLIPIDEMIA: ICD-10-CM

## 2024-01-08 PROBLEM — J02.9 PHARYNGITIS: Status: ACTIVE | Noted: 2023-05-09

## 2024-01-08 LAB — S PYO DNA SPEC NAA+PROBE: NOT DETECTED

## 2024-01-08 PROCEDURE — 99215 OFFICE O/P EST HI 40 MIN: CPT | Performed by: STUDENT IN AN ORGANIZED HEALTH CARE EDUCATION/TRAINING PROGRAM

## 2024-01-08 PROCEDURE — 3074F SYST BP LT 130 MM HG: CPT | Performed by: STUDENT IN AN ORGANIZED HEALTH CARE EDUCATION/TRAINING PROGRAM

## 2024-01-08 PROCEDURE — 3078F DIAST BP <80 MM HG: CPT | Performed by: STUDENT IN AN ORGANIZED HEALTH CARE EDUCATION/TRAINING PROGRAM

## 2024-01-08 PROCEDURE — 87651 STREP A DNA AMP PROBE: CPT | Performed by: STUDENT IN AN ORGANIZED HEALTH CARE EDUCATION/TRAINING PROGRAM

## 2024-01-08 RX ORDER — SIMVASTATIN 20 MG
1 TABLET ORAL
COMMUNITY
End: 2024-01-08

## 2024-01-08 RX ORDER — CELECOXIB 100 MG/1
100 CAPSULE ORAL 2 TIMES DAILY
Qty: 30 CAPSULE | Refills: 0 | Status: SHIPPED | OUTPATIENT
Start: 2024-01-08

## 2024-01-08 RX ORDER — HYOSCYAMINE SULFATE 0.125 MG
TABLET ORAL
COMMUNITY
Start: 2011-03-01 | End: 2024-03-12

## 2024-01-08 ASSESSMENT — FIBROSIS 4 INDEX: FIB4 SCORE: 1.76

## 2024-01-08 ASSESSMENT — PATIENT HEALTH QUESTIONNAIRE - PHQ9: CLINICAL INTERPRETATION OF PHQ2 SCORE: 0

## 2024-01-08 NOTE — PROGRESS NOTES
Subjective:       Chief Complaint   Patient presents with    Establish Care    Sore Throat     Since Saturday    Elbow Pain     (R)     Immunizations     Discuss RSV          HPI:   Heber presents today to establish care and with some acute concerns     Reports having some throat discomfort for last 3 days.  Denies any fever, chills, nasal congestion, cough, ear pain or sinus pressure.  Patient has history of strep throat last year for which he took antibiotics.  Is concerned that this could be a strep infection and wants to get tested.    Elbow pain  : Subacute going on for 2 months..  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 But continues to have pain in the lateral epicondyle region . No swelling or tenderness    Other chronic medical problems are stable.  Patient Active Problem List   Diagnosis    Dyslipidemia    Spastic intestine    Other male erectile dysfunction    Acute left-sided low back pain    Benign prostatic hyperplasia with nocturia    Erectile dysfunction    Other hemorrhoids    Healthcare maintenance    Downey's esophagus with dysplasia    Parmar's neuroma of right foot    Right knee pain    Anxiety    Neck pain    Cough    Rash    Venous insufficiency    Seborrheic keratosis    Hip pain    Right shoulder pain    Pain of toe of left foot    Closed fracture of phalanx of lesser toe of left foot    Elevated MCV    Flu-like symptoms    Pharyngitis     Current Outpatient Medications on File Prior to Visit   Medication Sig Dispense Refill    Cetirizine HCl 10 MG Cap       hyoscyamine (LEVSIN) 0.125 MG tablet       omeprazole (PRILOSEC) 20 MG delayed-release capsule Take 1 Capsule by mouth every day. 90 Capsule 1    tamsulosin (FLOMAX) 0.4 MG capsule TAKE ONE CAPSULE BY MOUTH EVERY DAY 30 MIN AFTER BREAKFAST 90 Capsule 3    simvastatin (ZOCOR) 40 MG Tab TAKE ONE TABLET BY MOUTH EVERY DAY IN THE EVENING 30  Tablet 0    Ascorbic Acid (VITAMIN C PO)       Cholecalciferol (VITAMIN D3 PO)       Fiber Powder Take 1 Each by mouth every day.      therapeutic multivitamin-minerals (THERAGRAN-M) Tab Take 1 Tab by mouth every day. 30 Tab 11    Omega-3 Fatty Acids (FISH OIL) 1200 MG Cap Take  by mouth.      Hyoscyamine Sulfate (HYOSCYAMINE PO) Take  by mouth. Once every 4 months (Patient not taking: Reported on 1/8/2024)       No current facility-administered medications on file prior to visit.         Problem   Pharyngitis   Benign Prostatic Hyperplasia With Nocturia    Chronic, stable   Currently taking Flomax 0.4 mg daily   Nocturia getting better, wakes up 2 times at night to go to bathroom now     Downey's Esophagus With Dysplasia    Chronic, stable  And had EGD done in 2015 which showed lower esophageal short segment Downey's esophagus which was about 2 cm long.  He was started on proton pump inhibitors.  Currently taking Prilosec 20 mg daily.  Patient was supposed to go back for surveillance follow-up EGD in 2 years but he lost follow-up.  Currently he does not have any acute symptoms or concerns with GERD.  I reviewed refer him to GI for further evaluation for repeat EGD  Plan  Continue omeprazole 20 mg daily.     Dyslipidemia     Latest Reference Range & Units 07/10/23 09:45 08/21/23 06:13   Cholesterol,Tot 100 - 199 mg/dL 172 156   Triglycerides 0 - 149 mg/dL 76 57   HDL >=40 mg/dL 54 53   LDL <100 mg/dL 103 (H) 92   (H): Data is abnormally high  The 10-year ASCVD risk score (Myriam DK, et al., 2019) is: 15.6%   Taking Zocor 40 mg daily   Denies any side effects, tolerating well   Plan:  Continue Zocor 40 mg daily   Continue healthy diet and exercise          Health Maintenance: Completed    History use of stomach cancer in father.  History of breast cancer and ovarian cancer in mother.     RSV vaccination is recommended for anyone who is 60+.  Okay to get RSV vaccination at the pharmacy.    ROS:  ROS    Review of  "systems unremarkable except for concerns noted by patient or items listed.    Please see HPI for additional ROS.      Objective:     Exam:  /72 (BP Location: Left arm, Patient Position: Sitting, BP Cuff Size: Large adult)   Pulse 64   Temp 36.3 °C (97.3 °F) (Temporal)   Ht 1.778 m (5' 10\")   Wt 90.2 kg (198 lb 12.8 oz)   SpO2 99%   BMI 28.52 kg/m²  Body mass index is 28.52 kg/m².    Physical Exam  Constitutional:       General: He is not in acute distress.     Appearance: Normal appearance.   HENT:      Head: Normocephalic.      Mouth/Throat:      Mouth: Mucous membranes are moist.      Pharynx: Oropharynx is clear. No oropharyngeal exudate.   Cardiovascular:      Rate and Rhythm: Normal rate and regular rhythm.      Pulses: Normal pulses.      Heart sounds: Normal heart sounds.   Pulmonary:      Effort: Pulmonary effort is normal.      Breath sounds: Normal breath sounds.   Skin:     General: Skin is warm.   Neurological:      Mental Status: He is alert and oriented to person, place, and time.   Psychiatric:         Mood and Affect: Mood normal.         Behavior: Behavior normal.       Nares: Patent with thin, clear mucus.  Sinuses: Non-tender over maxillary and frontal sinuses.  Throat:no enlarged tonsils. no exudates.   Neck: Supple, with shotty anterior cervical lymphadenopathy.    Labs: reviewed     Assessment & Plan:     73 y.o. male with the following -     1. Right elbow pain  Subacute   Most likely lateral epicondylitis / tendon sprain   Plan:  Warm compression   Elbow brace  Avoid lifting heavy weight for few weeks   Can consider topical analgesics as needed   - celecoxib (CELEBREX) 100 MG Cap; Take 1 Capsule by mouth 2 times a day.  Dispense: 30 Capsule; Refill: 0    2. Benign prostatic hyperplasia with nocturia  Chronic, stable   Currently taking Flomax 0.4 mg daily   Nocturia getting better, wakes up 2 times at night to go to bathroom now    Plan:  Continue Flomax 0.4 mg daily     3. " Dyslipidemia   Latest Reference Range & Units 07/10/23 09:45 08/21/23 06:13   Cholesterol,Tot 100 - 199 mg/dL 172 156   Triglycerides 0 - 149 mg/dL 76 57   HDL >=40 mg/dL 54 53   LDL <100 mg/dL 103 (H) 92   (H): Data is abnormally high  The 10-year ASCVD risk score (Myriam GUTIERREZ, et al., 2019) is: 15.6%   Taking Zocor 40 mg daily   Denies any side effects, tolerating well   Plan:  Continue Zocor 40 mg daily   Continue healthy diet and exercise     4. Downey's esophagus with dysplasia  Chronic, stable  And had EGD done in 2015 which showed lower esophageal short segment Downey's esophagus which was about 2 cm long.  He was started on proton pump inhibitors.  Currently taking Prilosec 20 mg daily.  Patient was supposed to go back for surveillance follow-up EGD in 2 years but he lost follow-up.  Currently he does not have any acute symptoms or concerns with GERD.  I reviewed refer him to GI for further evaluation for repeat EGD  - Referral to Gastroenterology    5. Throat pain    Acute, most likely viral   Poc strep was negative     PLAN:  Discussed benign nature of viral upper respiratory infections.  Okay to use throat lozenges as needed    OTC anti-pyretics and decongestants as needed. Supportive care advised.  Follow-up in office or urgent care for worsening symptoms, difficulty breathing, lack of expected recovery, or should new symptoms or problems arise.  - POCT GROUP A STREP, PCR          I spent a total of 42 minutes with record review, exam, communication with the patient, communication with other providers, and documentation of this encounter.      Return in about 6 months (around 7/8/2024) for medicare wellness.    Please note that this dictation was created using voice recognition software. I have made every reasonable attempt to correct obvious errors, but I expect that there are errors of grammar and possibly content that I did not discover before finalizing the note.

## 2024-01-25 ENCOUNTER — DOCUMENTATION (OUTPATIENT)
Dept: HEALTH INFORMATION MANAGEMENT | Facility: OTHER | Age: 74
End: 2024-01-25
Payer: COMMERCIAL

## 2024-02-23 RX ORDER — HYOSCYAMINE SULFATE 0.125 MG
TABLET ORAL
Qty: 120 TABLET | Refills: 1 | OUTPATIENT
Start: 2024-02-23

## 2024-02-23 NOTE — TELEPHONE ENCOUNTER
Received request via: Pharmacy    Was the patient seen in the last year in this department? Yes    Does the patient have an active prescription (recently filled or refills available) for medication(s) requested? No      Does the patient have [facility] Plus and need 100 day supply (blood pressure, diabetes and cholesterol meds only)? Patient does not have SCP

## 2024-02-24 NOTE — TELEPHONE ENCOUNTER
Received request via: Pharmacy    Was the patient seen in the last year in this department? Yes    Does the patient have an active prescription (recently filled or refills available) for medication(s) requested? No    Pharmacy Name: [facility]     Does the patient have [facility] and need 100 day supply (blood pressure, diabetes and cholesterol meds only)? Patient does not have SCP

## 2024-02-26 NOTE — TELEPHONE ENCOUNTER
Received request via: Pharmacy    Was the patient seen in the last year in this department? Yes    Does the patient have an active prescription (recently filled or refills available) for medication(s) requested? No    Pharmacy Name: [facility]    Does the patient have [facility] and need 100 day supply (blood pressure, diabetes and cholesterol meds only)? Patient does not have SCP  
sudden onset

## 2024-03-12 ENCOUNTER — OFFICE VISIT (OUTPATIENT)
Dept: MEDICAL GROUP | Facility: MEDICAL CENTER | Age: 74
End: 2024-03-12
Payer: COMMERCIAL

## 2024-03-12 VITALS
OXYGEN SATURATION: 98 % | TEMPERATURE: 97.2 F | HEIGHT: 70 IN | DIASTOLIC BLOOD PRESSURE: 72 MMHG | RESPIRATION RATE: 20 BRPM | HEART RATE: 70 BPM | BODY MASS INDEX: 28.8 KG/M2 | WEIGHT: 201.2 LBS | SYSTOLIC BLOOD PRESSURE: 124 MMHG

## 2024-03-12 DIAGNOSIS — J06.9 URI WITH COUGH AND CONGESTION: ICD-10-CM

## 2024-03-12 PROBLEM — Z00.00 HEALTHCARE MAINTENANCE: Status: RESOLVED | Noted: 2018-02-07 | Resolved: 2024-03-12

## 2024-03-12 PROBLEM — M79.675 PAIN OF TOE OF LEFT FOOT: Status: RESOLVED | Noted: 2022-07-26 | Resolved: 2024-03-12

## 2024-03-12 PROBLEM — R05.9 COUGH: Status: RESOLVED | Noted: 2022-05-31 | Resolved: 2024-03-12

## 2024-03-12 PROBLEM — R21 RASH: Status: RESOLVED | Noted: 2022-05-31 | Resolved: 2024-03-12

## 2024-03-12 PROBLEM — L82.1 SEBORRHEIC KERATOSIS: Status: RESOLVED | Noted: 2022-07-08 | Resolved: 2024-03-12

## 2024-03-12 PROBLEM — J02.9 PHARYNGITIS: Status: RESOLVED | Noted: 2023-05-09 | Resolved: 2024-03-12

## 2024-03-12 PROBLEM — R71.8 ELEVATED MCV: Status: RESOLVED | Noted: 2022-08-12 | Resolved: 2024-03-12

## 2024-03-12 PROBLEM — R68.89 FLU-LIKE SYMPTOMS: Status: RESOLVED | Noted: 2023-05-09 | Resolved: 2024-03-12

## 2024-03-12 LAB
FLUAV RNA SPEC QL NAA+PROBE: NEGATIVE
FLUBV RNA SPEC QL NAA+PROBE: NEGATIVE
RSV RNA SPEC QL NAA+PROBE: NEGATIVE
SARS-COV-2 RNA RESP QL NAA+PROBE: NEGATIVE

## 2024-03-12 PROCEDURE — 99214 OFFICE O/P EST MOD 30 MIN: CPT | Performed by: PHYSICIAN ASSISTANT

## 2024-03-12 PROCEDURE — 3074F SYST BP LT 130 MM HG: CPT | Performed by: PHYSICIAN ASSISTANT

## 2024-03-12 PROCEDURE — 3078F DIAST BP <80 MM HG: CPT | Performed by: PHYSICIAN ASSISTANT

## 2024-03-12 PROCEDURE — 0241U POCT CEPHEID COV-2, FLU A/B, RSV - PCR: CPT | Performed by: PHYSICIAN ASSISTANT

## 2024-03-12 RX ORDER — CODEINE PHOSPHATE/GUAIFENESIN 10-100MG/5
5-10 LIQUID (ML) ORAL 3 TIMES DAILY PRN
Qty: 180 ML | Refills: 0 | Status: SHIPPED | OUTPATIENT
Start: 2024-03-12 | End: 2024-03-22

## 2024-03-12 ASSESSMENT — ENCOUNTER SYMPTOMS
FATIGUE: 1
COUGH: 1

## 2024-03-12 ASSESSMENT — FIBROSIS 4 INDEX: FIB4 SCORE: 1.78

## 2024-03-12 NOTE — PROGRESS NOTES
"Chief Complaint   Patient presents with    Cough     congestion off and on x 4 days    Fatigue       Cough    Fatigue  Associated symptoms include coughing and fatigue.     Heber Jacobs is a 74 y.o. male here today for cough congestion and fatigue.    Started w a dry hacking cough on Friday,   Followed by sore throat and congestion, pos tiredness and fatigue, no fever, sore throat has resolved, no SOB  Vaccinated for COVID, flu and RSV,   elementray and high scool   Saturday felt better, Monday worse, took NyQuil and slept for 10 hours, woke up today feeling better    Exam:  /72 (BP Location: Left arm, Patient Position: Sitting)   Pulse 70   Temp 36.2 °C (97.2 °F) (Temporal)   Resp 20   Ht 1.778 m (5' 10\")   Wt 91.3 kg (201 lb 3.2 oz)   SpO2 98%       Constitutional: Alert, oriented in no acute distress.  Psych: Eye contact is good, speech goal directed, affect calm  Eyes: Conjunctiva non-injected, sclera non-icteric.  Lungs: Unlabored respiratory effort, clear to auscultation bilaterally with good excursion, no wheez or rhonci  CV: regular rate and rhythm. No lower extremity edema  Ears:  External ears unremarkable. TMs pearly gray, clear and intact, w/o any perforation or effusion.        A/P:    1. URI with cough and congestion  Patient is afebrile, O2 sat normal  - guaifenesin-codeine (TUSSI-ORGANIDIN NR) 100-10 MG/5ML syrup; Take 5-10 mL by mouth 3 times a day as needed for Cough for up to 10 days.  Dispense: 180 mL; Refill: 0  - POCT CEPHEID COV-2, FLU A/B, RSV - PCR    Letter given to patient to rest for 3 days      F/U:    "

## 2024-03-12 NOTE — LETTER
March 12, 2024         Patient: Heber Jacobs   YOB: 1950   Date of Visit: 3/12/2024           To Whom it May Concern:    Heber Jacobs was seen in my clinic on 3/12/2024. Please excuse his absence 03/12 - 03/14/2014.    If you have any questions or concerns, please don't hesitate to call.        Sincerely,           Elizabeth Archuleta P.A.-C.  Electronically Signed

## 2024-04-05 ENCOUNTER — OFFICE VISIT (OUTPATIENT)
Dept: MEDICAL GROUP | Facility: MEDICAL CENTER | Age: 74
End: 2024-04-05
Payer: COMMERCIAL

## 2024-04-05 ENCOUNTER — PATIENT MESSAGE (OUTPATIENT)
Dept: MEDICAL GROUP | Facility: MEDICAL CENTER | Age: 74
End: 2024-04-05

## 2024-04-05 VITALS
SYSTOLIC BLOOD PRESSURE: 118 MMHG | HEIGHT: 70 IN | TEMPERATURE: 96.9 F | BODY MASS INDEX: 28.71 KG/M2 | HEART RATE: 60 BPM | WEIGHT: 200.5 LBS | DIASTOLIC BLOOD PRESSURE: 72 MMHG | OXYGEN SATURATION: 98 % | RESPIRATION RATE: 20 BRPM

## 2024-04-05 DIAGNOSIS — M62.830 PARASPINAL MUSCLE SPASM: ICD-10-CM

## 2024-04-05 PROCEDURE — 3078F DIAST BP <80 MM HG: CPT | Performed by: PHYSICIAN ASSISTANT

## 2024-04-05 PROCEDURE — 99214 OFFICE O/P EST MOD 30 MIN: CPT | Performed by: PHYSICIAN ASSISTANT

## 2024-04-05 PROCEDURE — 3074F SYST BP LT 130 MM HG: CPT | Performed by: PHYSICIAN ASSISTANT

## 2024-04-05 RX ORDER — BACLOFEN 10 MG/1
10 TABLET ORAL NIGHTLY PRN
Qty: 30 TABLET | Refills: 0 | Status: SHIPPED | OUTPATIENT
Start: 2024-04-05

## 2024-04-05 RX ORDER — PREDNISONE 20 MG/1
20 TABLET ORAL EVERY MORNING
Qty: 3 TABLET | Refills: 0 | Status: SHIPPED | OUTPATIENT
Start: 2024-04-05

## 2024-04-05 RX ORDER — IBUPROFEN 600 MG/1
600 TABLET ORAL EVERY 8 HOURS PRN
Qty: 30 TABLET | Refills: 0 | Status: SHIPPED | OUTPATIENT
Start: 2024-04-05

## 2024-04-05 ASSESSMENT — FIBROSIS 4 INDEX: FIB4 SCORE: 1.78

## 2024-04-05 NOTE — Clinical Note
Please make sure patient knows he should not be taking ibuprofen with Celebrex if he still has some.  Elizabeth Archuleta P.A.-C.

## 2024-04-05 NOTE — PROGRESS NOTES
"Chief Complaint   Patient presents with    Low Back Pain     Pt drove back from [place], Tue pt walked for 1.5 hrs with non walking shoes and woke up with Lower back pain.  Pt got a massage Wed night with hot stones but did not help. Pt took Aleeve on Th and it helped a bit.        HPI  Heber Jacobs is a 74 y.o. male here today for lower back pain        4-5 days ago walked 1.5 mile in bad shoes, started having lower back pain bilaterally, localized, very tight, not going down the legs, took Aleive 1000 mg which helped but not resolved the pain, tried massage and heat w/o much improvement.   No loss of bladder or bowel control       Exam:  /72 (BP Location: Left arm, Patient Position: Sitting, BP Cuff Size: Large adult)   Pulse 60   Temp 36.1 °C (96.9 °F) (Temporal)   Resp 20   Ht 1.778 m (5' 10\")   Wt 90.9 kg (200 lb 8 oz)   SpO2 98%       Constitutional: Alert, oriented in no acute distress.  Psych: Eye contact is good, speech goal directed, affect calm  MS: Positive tenderness to palpation over the paraspinous muscles, lumbar flexion is limited due to pain lower extremity strength intact      1. Paraspinal muscle spasm  Last GFR kidney function was reviewed by me GFR over 60        - baclofen (LIORESAL) 10 MG Tab; Take 1 Tablet by mouth at bedtime as needed (lower back pain).  Dispense: 30 Tablet; Refill: 0  - predniSONE (DELTASONE) 20 MG Tab; Take 1 Tablet by mouth every morning.  Dispense: 3 Tablet; Refill: 0  - ibuprofen (MOTRIN) 600 MG Tab; Take 1 Tablet by mouth every 8 hours as needed for Moderate Pain.  Dispense: 30 Tablet; Refill: 0        F/U:    "

## 2024-05-22 DIAGNOSIS — K22.719 BARRETT'S ESOPHAGUS WITH DYSPLASIA: ICD-10-CM

## 2024-05-23 RX ORDER — OMEPRAZOLE 20 MG/1
20 CAPSULE, DELAYED RELEASE ORAL DAILY
Qty: 90 CAPSULE | Refills: 1 | Status: SHIPPED | OUTPATIENT
Start: 2024-05-23

## 2024-09-10 ENCOUNTER — PATIENT MESSAGE (OUTPATIENT)
Dept: MEDICAL GROUP | Facility: MEDICAL CENTER | Age: 74
End: 2024-09-10
Payer: COMMERCIAL

## 2024-09-10 DIAGNOSIS — Z00.00 WELLNESS EXAMINATION: ICD-10-CM

## 2024-09-10 DIAGNOSIS — Z12.5 PROSTATE CANCER SCREENING: ICD-10-CM

## 2024-09-10 SDOH — HEALTH STABILITY: MENTAL HEALTH
STRESS IS WHEN SOMEONE FEELS TENSE, NERVOUS, ANXIOUS, OR CAN'T SLEEP AT NIGHT BECAUSE THEIR MIND IS TROUBLED. HOW STRESSED ARE YOU?: ONLY A LITTLE

## 2024-09-10 SDOH — ECONOMIC STABILITY: FOOD INSECURITY: WITHIN THE PAST 12 MONTHS, THE FOOD YOU BOUGHT JUST DIDN'T LAST AND YOU DIDN'T HAVE MONEY TO GET MORE.: NEVER TRUE

## 2024-09-10 SDOH — ECONOMIC STABILITY: INCOME INSECURITY: IN THE LAST 12 MONTHS, WAS THERE A TIME WHEN YOU WERE NOT ABLE TO PAY THE MORTGAGE OR RENT ON TIME?: NO

## 2024-09-10 SDOH — ECONOMIC STABILITY: FOOD INSECURITY: WITHIN THE PAST 12 MONTHS, YOU WORRIED THAT YOUR FOOD WOULD RUN OUT BEFORE YOU GOT MONEY TO BUY MORE.: NEVER TRUE

## 2024-09-10 SDOH — HEALTH STABILITY: PHYSICAL HEALTH: ON AVERAGE, HOW MANY DAYS PER WEEK DO YOU ENGAGE IN MODERATE TO STRENUOUS EXERCISE (LIKE A BRISK WALK)?: 5 DAYS

## 2024-09-10 SDOH — HEALTH STABILITY: PHYSICAL HEALTH: ON AVERAGE, HOW MANY MINUTES DO YOU ENGAGE IN EXERCISE AT THIS LEVEL?: 50 MIN

## 2024-09-10 SDOH — ECONOMIC STABILITY: INCOME INSECURITY: HOW HARD IS IT FOR YOU TO PAY FOR THE VERY BASICS LIKE FOOD, HOUSING, MEDICAL CARE, AND HEATING?: NOT HARD AT ALL

## 2024-09-10 ASSESSMENT — SOCIAL DETERMINANTS OF HEALTH (SDOH)
DO YOU BELONG TO ANY CLUBS OR ORGANIZATIONS SUCH AS CHURCH GROUPS UNIONS, FRATERNAL OR ATHLETIC GROUPS, OR SCHOOL GROUPS?: YES
IN THE PAST 12 MONTHS, HAS THE ELECTRIC, GAS, OIL, OR WATER COMPANY THREATENED TO SHUT OFF SERVICE IN YOUR HOME?: NO
IN A TYPICAL WEEK, HOW MANY TIMES DO YOU TALK ON THE PHONE WITH FAMILY, FRIENDS, OR NEIGHBORS?: THREE TIMES A WEEK
HOW OFTEN DO YOU HAVE SIX OR MORE DRINKS ON ONE OCCASION: NEVER
HOW HARD IS IT FOR YOU TO PAY FOR THE VERY BASICS LIKE FOOD, HOUSING, MEDICAL CARE, AND HEATING?: NOT HARD AT ALL
HOW OFTEN DO YOU HAVE A DRINK CONTAINING ALCOHOL: MONTHLY OR LESS
WITHIN THE PAST 12 MONTHS, YOU WORRIED THAT YOUR FOOD WOULD RUN OUT BEFORE YOU GOT THE MONEY TO BUY MORE: NEVER TRUE
HOW OFTEN DO YOU ATTEND CHURCH OR RELIGIOUS SERVICES?: NEVER
HOW OFTEN DO YOU GET TOGETHER WITH FRIENDS OR RELATIVES?: TWICE A WEEK
HOW OFTEN DO YOU ATTENT MEETINGS OF THE CLUB OR ORGANIZATION YOU BELONG TO?: 1 TO 4 TIMES PER YEAR
HOW MANY DRINKS CONTAINING ALCOHOL DO YOU HAVE ON A TYPICAL DAY WHEN YOU ARE DRINKING: 1 OR 2
ARE YOU MARRIED, WIDOWED, DIVORCED, SEPARATED, NEVER MARRIED, OR LIVING WITH A PARTNER?: NEVER MARRIED
IN A TYPICAL WEEK, HOW MANY TIMES DO YOU TALK ON THE PHONE WITH FAMILY, FRIENDS, OR NEIGHBORS?: THREE TIMES A WEEK
HOW OFTEN DO YOU GET TOGETHER WITH FRIENDS OR RELATIVES?: TWICE A WEEK
DO YOU BELONG TO ANY CLUBS OR ORGANIZATIONS SUCH AS CHURCH GROUPS UNIONS, FRATERNAL OR ATHLETIC GROUPS, OR SCHOOL GROUPS?: YES
ARE YOU MARRIED, WIDOWED, DIVORCED, SEPARATED, NEVER MARRIED, OR LIVING WITH A PARTNER?: NEVER MARRIED
HOW OFTEN DO YOU ATTENT MEETINGS OF THE CLUB OR ORGANIZATION YOU BELONG TO?: 1 TO 4 TIMES PER YEAR
HOW OFTEN DO YOU ATTEND CHURCH OR RELIGIOUS SERVICES?: NEVER

## 2024-09-10 ASSESSMENT — LIFESTYLE VARIABLES
AUDIT-C TOTAL SCORE: 1
HOW MANY STANDARD DRINKS CONTAINING ALCOHOL DO YOU HAVE ON A TYPICAL DAY: 1 OR 2
SKIP TO QUESTIONS 9-10: 1
HOW OFTEN DO YOU HAVE SIX OR MORE DRINKS ON ONE OCCASION: NEVER
HOW OFTEN DO YOU HAVE A DRINK CONTAINING ALCOHOL: MONTHLY OR LESS

## 2024-09-11 ENCOUNTER — HOSPITAL ENCOUNTER (OUTPATIENT)
Dept: LAB | Facility: MEDICAL CENTER | Age: 74
End: 2024-09-11
Attending: STUDENT IN AN ORGANIZED HEALTH CARE EDUCATION/TRAINING PROGRAM
Payer: COMMERCIAL

## 2024-09-11 DIAGNOSIS — Z00.00 WELLNESS EXAMINATION: ICD-10-CM

## 2024-09-11 DIAGNOSIS — Z12.5 PROSTATE CANCER SCREENING: ICD-10-CM

## 2024-09-11 LAB
ALBUMIN SERPL BCP-MCNC: 4.4 G/DL (ref 3.2–4.9)
ALBUMIN/GLOB SERPL: 1.8 G/DL
ALP SERPL-CCNC: 70 U/L (ref 30–99)
ALT SERPL-CCNC: 19 U/L (ref 2–50)
ANION GAP SERPL CALC-SCNC: 11 MMOL/L (ref 7–16)
AST SERPL-CCNC: 22 U/L (ref 12–45)
BILIRUB SERPL-MCNC: 0.8 MG/DL (ref 0.1–1.5)
BUN SERPL-MCNC: 15 MG/DL (ref 8–22)
CALCIUM ALBUM COR SERPL-MCNC: 9.4 MG/DL (ref 8.5–10.5)
CALCIUM SERPL-MCNC: 9.7 MG/DL (ref 8.5–10.5)
CHLORIDE SERPL-SCNC: 104 MMOL/L (ref 96–112)
CHOLEST SERPL-MCNC: 164 MG/DL (ref 100–199)
CO2 SERPL-SCNC: 25 MMOL/L (ref 20–33)
CREAT SERPL-MCNC: 0.85 MG/DL (ref 0.5–1.4)
ERYTHROCYTE [DISTWIDTH] IN BLOOD BY AUTOMATED COUNT: 46.5 FL (ref 35.9–50)
GFR SERPLBLD CREATININE-BSD FMLA CKD-EPI: 92 ML/MIN/1.73 M 2
GLOBULIN SER CALC-MCNC: 2.5 G/DL (ref 1.9–3.5)
GLUCOSE SERPL-MCNC: 66 MG/DL (ref 65–99)
HCT VFR BLD AUTO: 45.5 % (ref 42–52)
HDLC SERPL-MCNC: 48 MG/DL
HGB BLD-MCNC: 15.8 G/DL (ref 14–18)
LDLC SERPL CALC-MCNC: 95 MG/DL
MCH RBC QN AUTO: 34.8 PG (ref 27–33)
MCHC RBC AUTO-ENTMCNC: 34.7 G/DL (ref 32.3–36.5)
MCV RBC AUTO: 100.2 FL (ref 81.4–97.8)
PLATELET # BLD AUTO: 221 K/UL (ref 164–446)
PMV BLD AUTO: 9.5 FL (ref 9–12.9)
POTASSIUM SERPL-SCNC: 4.8 MMOL/L (ref 3.6–5.5)
PROT SERPL-MCNC: 6.9 G/DL (ref 6–8.2)
PSA SERPL-MCNC: 3.63 NG/ML (ref 0–4)
RBC # BLD AUTO: 4.54 M/UL (ref 4.7–6.1)
SODIUM SERPL-SCNC: 140 MMOL/L (ref 135–145)
TRIGL SERPL-MCNC: 107 MG/DL (ref 0–149)
TSH SERPL DL<=0.005 MIU/L-ACNC: 1.86 UIU/ML (ref 0.38–5.33)
WBC # BLD AUTO: 5.3 K/UL (ref 4.8–10.8)

## 2024-09-11 PROCEDURE — 84443 ASSAY THYROID STIM HORMONE: CPT

## 2024-09-11 PROCEDURE — 80053 COMPREHEN METABOLIC PANEL: CPT

## 2024-09-11 PROCEDURE — 83036 HEMOGLOBIN GLYCOSYLATED A1C: CPT

## 2024-09-11 PROCEDURE — 80061 LIPID PANEL: CPT

## 2024-09-11 PROCEDURE — 84153 ASSAY OF PSA TOTAL: CPT

## 2024-09-11 PROCEDURE — 85027 COMPLETE CBC AUTOMATED: CPT

## 2024-09-11 PROCEDURE — 36415 COLL VENOUS BLD VENIPUNCTURE: CPT

## 2024-09-12 LAB
EST. AVERAGE GLUCOSE BLD GHB EST-MCNC: 105 MG/DL
HBA1C MFR BLD: 5.3 % (ref 4–5.6)

## 2024-09-13 ENCOUNTER — APPOINTMENT (OUTPATIENT)
Dept: MEDICAL GROUP | Facility: MEDICAL CENTER | Age: 74
End: 2024-09-13
Payer: COMMERCIAL

## 2024-09-13 ENCOUNTER — PATIENT MESSAGE (OUTPATIENT)
Dept: MEDICAL GROUP | Facility: MEDICAL CENTER | Age: 74
End: 2024-09-13

## 2024-09-13 VITALS
HEIGHT: 70 IN | TEMPERATURE: 96.5 F | OXYGEN SATURATION: 96 % | SYSTOLIC BLOOD PRESSURE: 100 MMHG | BODY MASS INDEX: 26.86 KG/M2 | HEART RATE: 61 BPM | DIASTOLIC BLOOD PRESSURE: 50 MMHG | WEIGHT: 187.6 LBS

## 2024-09-13 DIAGNOSIS — Z00.00 WELLNESS EXAMINATION: Primary | ICD-10-CM

## 2024-09-13 DIAGNOSIS — E78.5 DYSLIPIDEMIA: ICD-10-CM

## 2024-09-13 DIAGNOSIS — R10.32 LEFT INGUINAL PAIN: ICD-10-CM

## 2024-09-13 PROCEDURE — 3074F SYST BP LT 130 MM HG: CPT | Performed by: STUDENT IN AN ORGANIZED HEALTH CARE EDUCATION/TRAINING PROGRAM

## 2024-09-13 PROCEDURE — 99213 OFFICE O/P EST LOW 20 MIN: CPT | Mod: 25 | Performed by: STUDENT IN AN ORGANIZED HEALTH CARE EDUCATION/TRAINING PROGRAM

## 2024-09-13 PROCEDURE — 99397 PER PM REEVAL EST PAT 65+ YR: CPT | Performed by: STUDENT IN AN ORGANIZED HEALTH CARE EDUCATION/TRAINING PROGRAM

## 2024-09-13 PROCEDURE — 3078F DIAST BP <80 MM HG: CPT | Performed by: STUDENT IN AN ORGANIZED HEALTH CARE EDUCATION/TRAINING PROGRAM

## 2024-09-13 ASSESSMENT — FIBROSIS 4 INDEX: FIB4 SCORE: 1.64

## 2024-09-13 NOTE — PROGRESS NOTES
Subjective:     CC:   Chief Complaint   Patient presents with    Annual Exam    Weight Check     Wants to talk about weight loss    Other     Pain that starts in groin down left leg        HPI:   Heber Jacobs is a 74 y.o. male who presents for annual exam      Acute concern:  5 days back  noticed Left lower leg discomfort   When get up from sitting to standing - Left groin pain and then shoots down the leg on the medial side     Labs reviewed  MCV slightly elevated but stable   Lab Results   Component Value Date/Time    CHOLSTRLTOT 164 2024 09    TRIGLYCERIDE 107 2024 0930    HDL 48 2024 0930    LDL 95 2024 0930         Next Colorectal Cancer Screenin2027  Last Tdap: 10/2018  Received HPV series: Aged out  Hx STDs: No    Exercise: moderate regular exercise, aerobic < 3 days a week  Diet: healthy balanced diet       He  has a past medical history of Allergy, Arthritis, Downey esophagus, GERD (gastroesophageal reflux disease), Hemorrhoid, Hyperlipidemia, and Nocturia.  He  has a past surgical history that includes knee arthroscopy (2013); exploratory laparotomy; and hernia repair.    Family History   Problem Relation Age of Onset    Breast Cancer Mother     Tubal Cancer Mother     Ovarian Cancer Mother     Cancer Mother         breast cancer    Heart Disease Father     Cancer Father         intestine cancer    Cancer Brother          of leukemia at age 3    Peritoneal Cancer Neg Hx     Colorectal Cancer Neg Hx      Social History     Tobacco Use    Smoking status: Never    Smokeless tobacco: Never    Tobacco comments:     n/a   Vaping Use    Vaping status: Never Used   Substance Use Topics    Alcohol use: Yes     Alcohol/week: 1.8 oz     Types: 1 Glasses of wine, 1 Cans of beer, 1 Standard drinks or equivalent per week     Comment: 2-3 drinks a month    Drug use: Never     He  reports that he is not currently sexually active and has had partner(s) who are  female.    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 90 Capsule 1    tamsulosin (FLOMAX) 0.4 MG capsule TAKE ONE CAPSULE BY MOUTH EVERY DAY 30 MIN AFTER BREAKFAST 90 Capsule 3    simvastatin (ZOCOR) 40 MG Tab TAKE ONE TABLET BY MOUTH EVERY DAY IN THE EVENING 30 Tablet 0    Hyoscyamine Sulfate (HYOSCYAMINE PO) Take  by mouth. Once every 4 months      Ascorbic Acid (VITAMIN C PO)       Cholecalciferol (VITAMIN D3 PO)       Fiber Powder Take 1 Each by mouth every day.      therapeutic multivitamin-minerals (THERAGRAN-M) Tab Take 1 Tab by mouth every day. 30 Tab 11    Omega-3 Fatty Acids (FISH OIL) 1200 MG Cap Take  by mouth.       No current facility-administered medications for this visit.     No Known Allergies    Review of Systems   Constitutional: Negative for fever, chills and malaise/fatigue.   HENT: Negative for congestion.    Eyes: Negative for pain.   Respiratory: Negative for cough and shortness of breath.    Cardiovascular: Negative for chest pain and leg swelling.   Gastrointestinal: Negative for nausea, vomiting, abdominal pain and diarrhea.   Genitourinary: Negative for dysuria and hematuria.   Skin: Negative for rash.   Neurological: Negative for dizziness, focal weakness and headaches.  "  Endo/Heme/Allergies: Does not bruise/bleed easily.   Psychiatric/Behavioral: Negative for depression.  The patient is not nervous/anxious.      Objective:   /50 (BP Location: Left arm, Patient Position: Sitting, BP Cuff Size: Adult)   Pulse 61   Temp 35.8 °C (96.5 °F) (Temporal)   Ht 1.778 m (5' 10\")   Wt 85.1 kg (187 lb 9.6 oz)   SpO2 96%   BMI 26.92 kg/m²      Wt Readings from Last 4 Encounters:   09/13/24 85.1 kg (187 lb 9.6 oz)   04/05/24 90.9 kg (200 lb 8 oz)   03/12/24 91.3 kg (201 lb 3.2 oz)   01/08/24 90.2 kg (198 lb 12.8 oz)         Physical Exam:  Constitutional: Well-developed and well-nourished. Not diaphoretic. No distress.   Skin: Skin is warm and dry. No rash noted.  Head: Atraumatic without lesions.  Eyes: Conjunctivae and extraocular motions are normal. Pupils are equal, round, and reactive to light. No scleral icterus.   Ears:  External ears unremarkable. Tympanic membranes clear and intact.  Nose: Nares patent. Septum midline. Turbinates without erythema nor edema. No discharge.   Mouth/Throat: Tongue normal. Oropharynx is clear and moist. Posterior pharynx without erythema or exudates.  Neck: Supple, trachea midline. Normal range of motion. No thyromegaly present. No lymphadenopathy--cervical or supraclavicular.  Cardiovascular: Regular rate and rhythm, S1 and S2 without murmur, rubs, or gallops.    Respiratory: Effort normal. Clear to auscultation throughout. No adventitious sounds.   Abdomen: Soft, non tender, and without distention. Active bowel sounds in all four quadrants. No rebound, guarding, masses or HSM.    Extremities: No cyanosis, clubbing, erythema, nor edema. Distal pulses intact and symmetric.   Musculoskeletal: All major joints AROM full in all directions without pain.  Neurological: Alert and oriented x 3. Grossly non-focal. Strength and sensation grossly intact. DTRs 2+/3 and symmetric.   Psychiatric:  Behavior, mood, and affect are appropriate.      Assessment and " Plan:     1. Wellness examination  Health maintenance:    Discussed:  regular exercise   healthy diet  Sun protection w SPF  Biannual dentist visit   Substance Abuse: Declined  Safe in relationship. Yes  Seat belts, bike helmet, gun safety discussed.  Colon cancer screening: up to date  Last lab results were reviewed by me today    Labs per orders  Immunizations per orders  Patient counseled about skin care, diet, supplements, and exercise.  Discussed diet and exercise, colorectal cancer screening, adequate intake of calcium and vitamin D, and prostate cancer screening     2.  Dyslipidemia  Chronic, stable   Lab Results   Component Value Date/Time    CHOLSTRLTOT 164 09/11/2024 0930    TRIGLYCERIDE 107 09/11/2024 0930    HDL 48 09/11/2024 0930    LDL 95 09/11/2024 0930       The 10-year ASCVD risk score (Myriam GUTIERREZ, et al., 2019) is: 12.9%  Continue zocor 40 mg daily   Recommended to continue low fat healthy diet and regular exercise     4.Left groin pain   Acute , unknown etiology   Possible inguinal hernia vs left hip OA   Or cutaneous femoral nerve discomfort due to tight clothes   Plan:  If normal US and recurrence of symptoms - hip xray +/- physical therapy   - US-INGUINAL HERNIA; Future        Follow-up: Return in about 6 months (around 3/13/2025), or if symptoms worsen or fail to improve.

## 2024-09-20 ENCOUNTER — PATIENT MESSAGE (OUTPATIENT)
Dept: MEDICAL GROUP | Facility: MEDICAL CENTER | Age: 74
End: 2024-09-20
Payer: COMMERCIAL

## 2024-09-20 DIAGNOSIS — M25.662 STIFFNESS OF LEFT KNEE: ICD-10-CM

## 2024-09-20 DIAGNOSIS — R10.32 LEFT INGUINAL PAIN: ICD-10-CM

## 2024-10-10 ENCOUNTER — HOSPITAL ENCOUNTER (OUTPATIENT)
Dept: RADIOLOGY | Facility: MEDICAL CENTER | Age: 74
End: 2024-10-10
Attending: STUDENT IN AN ORGANIZED HEALTH CARE EDUCATION/TRAINING PROGRAM
Payer: COMMERCIAL

## 2024-10-10 DIAGNOSIS — R10.32 LEFT INGUINAL PAIN: ICD-10-CM

## 2024-10-10 PROCEDURE — 76857 US EXAM PELVIC LIMITED: CPT

## 2024-10-12 ENCOUNTER — PATIENT MESSAGE (OUTPATIENT)
Dept: MEDICAL GROUP | Facility: MEDICAL CENTER | Age: 74
End: 2024-10-12
Payer: COMMERCIAL

## 2024-10-12 DIAGNOSIS — M25.552 CHRONIC LEFT HIP PAIN: ICD-10-CM

## 2024-10-12 DIAGNOSIS — G89.29 CHRONIC LEFT HIP PAIN: ICD-10-CM

## 2024-10-12 DIAGNOSIS — M25.662 STIFFNESS OF LEFT KNEE: ICD-10-CM

## 2024-10-14 ENCOUNTER — HOSPITAL ENCOUNTER (OUTPATIENT)
Dept: RADIOLOGY | Facility: MEDICAL CENTER | Age: 74
End: 2024-10-14
Attending: STUDENT IN AN ORGANIZED HEALTH CARE EDUCATION/TRAINING PROGRAM
Payer: COMMERCIAL

## 2024-10-14 DIAGNOSIS — M25.552 CHRONIC LEFT HIP PAIN: ICD-10-CM

## 2024-10-14 DIAGNOSIS — G89.29 CHRONIC LEFT HIP PAIN: ICD-10-CM

## 2024-10-14 DIAGNOSIS — M25.662 STIFFNESS OF LEFT KNEE: ICD-10-CM

## 2024-10-14 PROCEDURE — 73562 X-RAY EXAM OF KNEE 3: CPT | Mod: LT

## 2024-10-14 PROCEDURE — 73502 X-RAY EXAM HIP UNI 2-3 VIEWS: CPT | Mod: LT

## 2024-10-16 ENCOUNTER — PATIENT MESSAGE (OUTPATIENT)
Dept: MEDICAL GROUP | Facility: MEDICAL CENTER | Age: 74
End: 2024-10-16
Payer: COMMERCIAL

## 2024-10-16 DIAGNOSIS — M25.662 STIFFNESS OF LEFT KNEE: ICD-10-CM

## 2024-10-16 DIAGNOSIS — M25.552 CHRONIC LEFT HIP PAIN: ICD-10-CM

## 2024-10-16 DIAGNOSIS — G89.29 CHRONIC LEFT HIP PAIN: ICD-10-CM

## 2024-10-17 ENCOUNTER — PATIENT MESSAGE (OUTPATIENT)
Dept: MEDICAL GROUP | Facility: MEDICAL CENTER | Age: 74
End: 2024-10-17
Payer: COMMERCIAL

## 2024-10-31 DIAGNOSIS — E78.5 DYSLIPIDEMIA: ICD-10-CM

## 2024-10-31 NOTE — TELEPHONE ENCOUNTER
Received request via: Patient    Was the patient seen in the last year in this department? Yes    Does the patient have an active prescription (recently filled or refills available) for medication(s) requested? No    Does the patient have halfway Plus and need 100-day supply? (This applies to ALL medications) Patient does not have SCP

## 2024-11-01 RX ORDER — SIMVASTATIN 40 MG
40 TABLET ORAL EVERY EVENING
Qty: 90 TABLET | Refills: 3 | Status: SHIPPED | OUTPATIENT
Start: 2024-11-01

## 2024-11-09 ENCOUNTER — PATIENT MESSAGE (OUTPATIENT)
Dept: MEDICAL GROUP | Facility: MEDICAL CENTER | Age: 74
End: 2024-11-09
Payer: COMMERCIAL

## 2024-11-16 DIAGNOSIS — K22.719 BARRETT'S ESOPHAGUS WITH DYSPLASIA: ICD-10-CM

## 2024-11-18 NOTE — TELEPHONE ENCOUNTER
Received request via: Pharmacy    Was the patient seen in the last year in this department? Yes    Does the patient have an active prescription (recently filled or refills available) for medication(s) requested? No    [facility] Name[facility]    Does the patient have USP Plus and need 100-day supply? (This applies to ALL medications) Patient does not have SCP

## 2024-12-25 ENCOUNTER — PATIENT MESSAGE (OUTPATIENT)
Dept: MEDICAL GROUP | Facility: MEDICAL CENTER | Age: 74
End: 2024-12-25
Payer: COMMERCIAL

## 2024-12-26 ENCOUNTER — PATIENT MESSAGE (OUTPATIENT)
Dept: MEDICAL GROUP | Facility: MEDICAL CENTER | Age: 74
End: 2024-12-26
Payer: COMMERCIAL

## 2024-12-26 DIAGNOSIS — R35.1 BENIGN PROSTATIC HYPERPLASIA WITH NOCTURIA: ICD-10-CM

## 2024-12-26 DIAGNOSIS — N40.1 BENIGN PROSTATIC HYPERPLASIA WITH NOCTURIA: ICD-10-CM

## 2025-01-15 ENCOUNTER — PATIENT MESSAGE (OUTPATIENT)
Dept: MEDICAL GROUP | Facility: MEDICAL CENTER | Age: 75
End: 2025-01-15
Payer: COMMERCIAL

## 2025-01-15 DIAGNOSIS — M25.552 CHRONIC LEFT HIP PAIN: ICD-10-CM

## 2025-01-15 DIAGNOSIS — M25.662 STIFFNESS OF LEFT KNEE: ICD-10-CM

## 2025-01-15 DIAGNOSIS — G89.29 CHRONIC LEFT HIP PAIN: ICD-10-CM

## 2025-01-21 ENCOUNTER — APPOINTMENT (OUTPATIENT)
Dept: PHYSICAL THERAPY | Facility: MEDICAL CENTER | Age: 75
End: 2025-01-21
Attending: STUDENT IN AN ORGANIZED HEALTH CARE EDUCATION/TRAINING PROGRAM
Payer: COMMERCIAL

## 2025-01-28 ENCOUNTER — APPOINTMENT (OUTPATIENT)
Dept: PHYSICAL THERAPY | Facility: MEDICAL CENTER | Age: 75
End: 2025-01-28
Attending: STUDENT IN AN ORGANIZED HEALTH CARE EDUCATION/TRAINING PROGRAM
Payer: COMMERCIAL

## 2025-01-30 ENCOUNTER — APPOINTMENT (OUTPATIENT)
Dept: PHYSICAL THERAPY | Facility: MEDICAL CENTER | Age: 75
End: 2025-01-30
Attending: STUDENT IN AN ORGANIZED HEALTH CARE EDUCATION/TRAINING PROGRAM
Payer: COMMERCIAL

## 2025-02-03 ENCOUNTER — APPOINTMENT (OUTPATIENT)
Dept: UROLOGY | Facility: MEDICAL CENTER | Age: 75
End: 2025-02-03
Payer: COMMERCIAL

## 2025-02-03 ENCOUNTER — APPOINTMENT (OUTPATIENT)
Dept: PHYSICAL THERAPY | Facility: MEDICAL CENTER | Age: 75
End: 2025-02-03
Attending: STUDENT IN AN ORGANIZED HEALTH CARE EDUCATION/TRAINING PROGRAM
Payer: COMMERCIAL

## 2025-02-03 DIAGNOSIS — N40.1 BENIGN PROSTATIC HYPERPLASIA WITH NOCTURIA: ICD-10-CM

## 2025-02-03 DIAGNOSIS — R35.1 BENIGN PROSTATIC HYPERPLASIA WITH NOCTURIA: ICD-10-CM

## 2025-02-03 PROCEDURE — 99203 OFFICE O/P NEW LOW 30 MIN: CPT | Performed by: UROLOGY

## 2025-02-03 NOTE — PROGRESS NOTES
Chief Complaint: BPH with nocturia    HPI: Heber Jacobs is a 75 y.o. male in very good overall health, with a history of a few years of bothersome nocturia as well as decreased urinary flow and daytime urinary frequency.     He was prescribed tamsulosin a couple years ago and took it for about one year, but noticed no change in his urinary flow, frequency, or nocturia.    He wakes up 2-3 times per night to void. In the morning, his first void is difficult and he often has to return to the restroom about 5 minutes later.     He voids about every hour during the day.     In the evening his only bladder irritant is chocolate.      Symptoms:  Frequency: yes  Urgency: no  Nocturia: yes x 3  Stress incontinence: no  Urge incontinence: no  Dysuria: no  Gross hematuria: no  Weakened stream: no  Strain to empty: no      Past Medical History:  Past Medical History:   Diagnosis Date    Allergy     Arthritis     Downey esophagus     GERD (gastroesophageal reflux disease)     Hemorrhoid     Hyperlipidemia     Nocturia        Past Surgical History:  Past Surgical History:   Procedure Laterality Date    KNEE ARTHROSCOPY  2013    right knee meniscus tear    EXPLORATORY LAPAROTOMY      HERNIA REPAIR         Family History:  Family History   Problem Relation Age of Onset    Breast Cancer Mother     Tubal Cancer Mother     Ovarian Cancer Mother     Cancer Mother         breast cancer    Heart Disease Father     Cancer Father         intestine cancer    Cancer Brother          of leukemia at age 3    Peritoneal Cancer Neg Hx     Colorectal Cancer Neg Hx        Social History:  Social History     Socioeconomic History    Marital status: Single     Spouse name: Not on file    Number of children: Not on file    Years of education: Not on file    Highest education level: Bachelor's degree (e.g., BA, AB, BS)   Occupational History    Not on file   Tobacco Use    Smoking status: Never    Smokeless tobacco: Never    Tobacco  comments:     n/a   Vaping Use    Vaping status: Never Used   Substance and Sexual Activity    Alcohol use: Yes     Alcohol/week: 1.8 oz     Types: 1 Glasses of wine, 1 Cans of beer, 1 Standard drinks or equivalent per week     Comment: 2-3 drinks a month    Drug use: Never    Sexual activity: Not Currently     Partners: Female     Comment: n/a   Other Topics Concern    Not on file   Social History Narrative    Not on file     Social Drivers of Health     Financial Resource Strain: Low Risk  (9/10/2024)    Overall Financial Resource Strain (CARDIA)     Difficulty of Paying Living Expenses: Not hard at all   Food Insecurity: No Food Insecurity (9/10/2024)    Hunger Vital Sign     Worried About Running Out of Food in the Last Year: Never true     Ran Out of Food in the Last Year: Never true   Transportation Needs: No Transportation Needs (9/10/2024)    PRAPARE - Transportation     Lack of Transportation (Medical): No     Lack of Transportation (Non-Medical): No   Physical Activity: Sufficiently Active (9/10/2024)    Exercise Vital Sign     Days of Exercise per Week: 5 days     Minutes of Exercise per Session: 50 min   Stress: No Stress Concern Present (9/10/2024)    [employer] [employer] - Occupational Stress Questionnaire     Feeling of Stress : Only a little   Social Connections: Moderately Isolated (9/10/2024)    Social Connection and Isolation Panel [NHANES]     Frequency of Communication with Friends and Family: Three times a week     Frequency of Social Gatherings with Friends and Family: Twice a week     Attends Confucianism Services: Never     Active Member of Clubs or Organizations: Yes     Attends Club or Organization Meetings: 1 to 4 times per year     Marital Status: Never    Intimate Partner Violence: Not on file   Housing Stability: Low Risk  (9/10/2024)    Housing Stability Vital Sign     Unable to Pay for Housing in the Last Year: No     Number of Times Moved in the Last Year: 0      Homeless in the Last Year: No       Medications:  Current Outpatient Medications   Medication Sig Dispense Refill    omeprazole (PRILOSEC) 20 MG delayed-release capsule TAKE ONE CAPSULE BY MOUTH DAILY GENERIC FOR PRILOSEC 90 Capsule 1    simvastatin (ZOCOR) 40 MG Tab Take 1 Tablet by mouth every evening. 90 Tablet 3    tamsulosin (FLOMAX) 0.4 MG capsule TAKE ONE CAPSULE BY MOUTH EVERY DAY 30 MIN AFTER BREAKFAST 90 Capsule 3    Hyoscyamine Sulfate (HYOSCYAMINE PO) Take  by mouth. Once every 4 months      Ascorbic Acid (VITAMIN C PO)       Cholecalciferol (VITAMIN D3 PO)       Fiber Powder Take 1 Each by mouth every day.      therapeutic multivitamin-minerals (THERAGRAN-M) Tab Take 1 Tab by mouth every day. 30 Tab 11    Omega-3 Fatty Acids (FISH OIL) 1200 MG Cap Take  by mouth.       No current facility-administered medications for this visit.       Allergies:  No Known Allergies    Review of Systems:  Constitutional: Negative for fever, chills and malaise/fatigue.   HENT: Negative for congestion.    Eyes: Negative for pain.   Respiratory: Negative for cough and shortness of breath.    Cardiovascular: Negative for leg swelling.   Gastrointestinal: Negative for nausea, vomiting, abdominal pain and diarrhea.   Genitourinary: Negative for dysuria and hematuria.   Skin: Negative for rash.   Neurological: Negative for dizziness, focal weakness and headaches.   Endo/Heme/Allergies: Does not bruise/bleed easily.   Psychiatric/Behavioral: Negative for depression.  The patient is not nervous/anxious.        Physical Exam:  There were no vitals filed for this visit.    GENERAL: well appearing, well nourished, NAD  RESP: respiratory effort normal  ABDOMEN: soft, nontender, nondistended, no masses or organomegaly  HERNIAS: no hernias found on exam  SKIN/LYMPH: normal coloration and turgor, no suspicious skin lesions noted  NEURO/PSYCH: alert, oriented, normal speech, no focal findings or movement disorder noted  EXTREMITIES:  "no pedal edema noted  GENITOURINARY: normal male external genitalia, penis is normal, testes descended bilaterally, no testicular masses or scrotal swelling, and normal meatus without stenosis  RECTAL: Exam Deferred    Data Review:    Labs:  POCT UA No results found for: \"POCCOLOR\", \"POCAPPEAR\", \"POCLEUKEST\", \"POCNITRITE\", \"POCUROBILIGE\", \"POCPROTEIN\", \"POCURPH\", \"POCBLOOD\", \"POCSPGRV\", \"POCKETONES\", \"POCBILIRUBIN\", \"POCGLUCUA\"   CBC   Lab Results   Component Value Date/Time    WBC 5.3 09/11/2024 0930    RBC 4.54 (L) 09/11/2024 0930    HEMOGLOBIN 15.8 09/11/2024 0930    HEMATOCRIT 45.5 09/11/2024 0930    .2 (H) 09/11/2024 0930    MCH 34.8 (H) 09/11/2024 0930    MCHC 34.7 09/11/2024 0930    RDW 46.5 09/11/2024 0930    MPV 9.5 09/11/2024 0930    LYMPHOCYTES 33.80 07/14/2022 0910    LYMPHS 1.57 07/14/2022 0910    MONOCYTES 9.90 07/14/2022 0910    MONOS 0.46 07/14/2022 0910    EOSINOPHILS 3.70 07/14/2022 0910    EOS 0.17 07/14/2022 0910    BASOPHILS 0.40 07/14/2022 0910    BASO 0.02 07/14/2022 0910    NRBC 0.00 07/14/2022 0910       CMP   Lab Results   Component Value Date/Time    SODIUM 140 09/11/2024 0930    POTASSIUM 4.8 09/11/2024 0930    CHLORIDE 104 09/11/2024 0930    CO2 25 09/11/2024 0930    ANION 11.0 09/11/2024 0930    GLUCOSE 66 09/11/2024 0930    BUN 15 09/11/2024 0930    CREATININE 0.85 09/11/2024 0930    GFRCKD 92 09/11/2024 0930    CALCIUM 9.7 09/11/2024 0930    CORRCALC 9.4 09/11/2024 0930    ASTSGOT 22 09/11/2024 0930    ALTSGPT 19 09/11/2024 0930    ALKPHOSPHAT 70 09/11/2024 0930    TBILIRUBIN 0.8 09/11/2024 0930    ALBUMIN 4.4 09/11/2024 0930    TOTPROTEIN 6.9 09/11/2024 0930    GLOBULIN 2.5 09/11/2024 0930    AGRATIO 1.8 09/11/2024 0930     INFERTILITY   Lab Results   Component Value Date/Time    TESTOSTERONE 472 02/21/2018 0814     PSA   Lab Results   Component Value Date/Time    PSATOTAL 3.63 09/11/2024 0930       Assessment: 75 y.o. male with a history of progressive obstructive lower " urinary tract symptoms as well as nocturia 2-3 times per night and urinary frequency about every hour during the day.     We discussed dietary and fluid interventions, and both medical and surgical interventions for BPH and bladder outlet obstruction. He'll start with PM fluid restriction, limiting bladder irritants, and we'll evaluate his prostate volume.       Plan:    -Avoid bladder irritants in the evening  -Fluid restriction for 2 hours before bedtime  -US bladder to assess prostate volume  -Will call with results and next steps; may consider medical or surgical management depending on prostate volume  -Will arrange for tentative 6 month follow up, but will speak before that time after ultrasound      Olu Lu MD

## 2025-02-06 ENCOUNTER — APPOINTMENT (OUTPATIENT)
Dept: PHYSICAL THERAPY | Facility: MEDICAL CENTER | Age: 75
End: 2025-02-06
Attending: STUDENT IN AN ORGANIZED HEALTH CARE EDUCATION/TRAINING PROGRAM
Payer: COMMERCIAL

## 2025-02-10 ENCOUNTER — APPOINTMENT (OUTPATIENT)
Dept: PHYSICAL THERAPY | Facility: MEDICAL CENTER | Age: 75
End: 2025-02-10
Attending: STUDENT IN AN ORGANIZED HEALTH CARE EDUCATION/TRAINING PROGRAM
Payer: COMMERCIAL

## 2025-02-12 ENCOUNTER — APPOINTMENT (OUTPATIENT)
Dept: PHYSICAL THERAPY | Facility: MEDICAL CENTER | Age: 75
End: 2025-02-12
Attending: STUDENT IN AN ORGANIZED HEALTH CARE EDUCATION/TRAINING PROGRAM
Payer: COMMERCIAL

## 2025-02-17 ENCOUNTER — APPOINTMENT (OUTPATIENT)
Dept: PHYSICAL THERAPY | Facility: MEDICAL CENTER | Age: 75
End: 2025-02-17
Attending: STUDENT IN AN ORGANIZED HEALTH CARE EDUCATION/TRAINING PROGRAM
Payer: COMMERCIAL

## 2025-02-19 ENCOUNTER — APPOINTMENT (OUTPATIENT)
Dept: PHYSICAL THERAPY | Facility: MEDICAL CENTER | Age: 75
End: 2025-02-19
Attending: STUDENT IN AN ORGANIZED HEALTH CARE EDUCATION/TRAINING PROGRAM
Payer: COMMERCIAL

## 2025-02-24 ENCOUNTER — APPOINTMENT (OUTPATIENT)
Dept: PHYSICAL THERAPY | Facility: MEDICAL CENTER | Age: 75
End: 2025-02-24
Attending: STUDENT IN AN ORGANIZED HEALTH CARE EDUCATION/TRAINING PROGRAM
Payer: COMMERCIAL

## 2025-02-26 ENCOUNTER — APPOINTMENT (OUTPATIENT)
Dept: PHYSICAL THERAPY | Facility: MEDICAL CENTER | Age: 75
End: 2025-02-26
Attending: STUDENT IN AN ORGANIZED HEALTH CARE EDUCATION/TRAINING PROGRAM
Payer: COMMERCIAL

## 2025-03-03 ENCOUNTER — OFFICE VISIT (OUTPATIENT)
Facility: MEDICAL CENTER | Age: 75
End: 2025-03-03
Payer: COMMERCIAL

## 2025-03-03 VITALS
BODY MASS INDEX: 27.46 KG/M2 | HEIGHT: 70 IN | OXYGEN SATURATION: 97 % | WEIGHT: 191.8 LBS | TEMPERATURE: 97.2 F | HEART RATE: 70 BPM | DIASTOLIC BLOOD PRESSURE: 82 MMHG | SYSTOLIC BLOOD PRESSURE: 132 MMHG

## 2025-03-03 DIAGNOSIS — M17.12 PRIMARY OSTEOARTHRITIS OF LEFT KNEE: ICD-10-CM

## 2025-03-03 PROCEDURE — 3079F DIAST BP 80-89 MM HG: CPT | Performed by: ORTHOPAEDIC SURGERY

## 2025-03-03 PROCEDURE — 3075F SYST BP GE 130 - 139MM HG: CPT | Performed by: ORTHOPAEDIC SURGERY

## 2025-03-03 PROCEDURE — 99203 OFFICE O/P NEW LOW 30 MIN: CPT | Performed by: ORTHOPAEDIC SURGERY

## 2025-03-03 ASSESSMENT — FIBROSIS 4 INDEX: FIB4 SCORE: 1.67

## 2025-03-03 ASSESSMENT — ENCOUNTER SYMPTOMS
SENSORY CHANGE: 0
WEAKNESS: 0
TINGLING: 0

## 2025-03-03 NOTE — PROGRESS NOTES
[facility] Orthopedic Surgery    Subjective:   3/3/2025 10:05 AM  Primary care physician:Libby Thomas M.D.    Chief Complaint:   Left knee pain    History of presenting illness:  Heber Jacobs  is a pleasant 75 y.o. male who was referred for evaluation of left knee and hip pain. He reports it is only his knee that is bothering him though and not his pain. The pain is located along the medial aspect. This has been ongoing for almost a year. There was no injury or trauma. They have tried physical therapy and topical creams . This has not relieved their symptoms adequately.     History of diabetes: no  History of tobacco use: Never  History of renal disease: no  Use of anticoagulants: no  Prior surgeries on this limb/joint: no    Past Medical History:   Diagnosis Date    Allergy     Arthritis     Downey esophagus     GERD (gastroesophageal reflux disease)     Hemorrhoid     Hyperlipidemia     Nocturia      Past Surgical History:   Procedure Laterality Date    KNEE ARTHROSCOPY  09/2013    right knee meniscus tear    EXPLORATORY LAPAROTOMY      HERNIA REPAIR       No Known Allergies  Outpatient Encounter Medications as of 3/3/2025   Medication Sig Dispense Refill    omeprazole (PRILOSEC) 20 MG delayed-release capsule TAKE ONE CAPSULE BY MOUTH DAILY GENERIC FOR PRILOSEC 90 Capsule 1    simvastatin (ZOCOR) 40 MG Tab Take 1 Tablet by mouth every evening. 90 Tablet 3    tamsulosin (FLOMAX) 0.4 MG capsule TAKE ONE CAPSULE BY MOUTH EVERY DAY 30 MIN AFTER BREAKFAST 90 Capsule 3    Hyoscyamine Sulfate (HYOSCYAMINE PO) Take  by mouth. Once every 4 months      Ascorbic Acid (VITAMIN C PO)       Cholecalciferol (VITAMIN D3 PO)       Fiber Powder Take 1 Each by mouth every day.      therapeutic multivitamin-minerals (THERAGRAN-M) Tab Take 1 Tab by mouth every day. 30 Tab 11    Omega-3 Fatty Acids (FISH OIL) 1200 MG Cap Take  by mouth.       No facility-administered encounter medications on file as of 3/3/2025.     Social History      Socioeconomic History    Marital status: Single     Spouse name: Not on file    Number of children: Not on file    Years of education: Not on file    Highest education level: Bachelor's degree (e.g., BA, AB, BS)   Occupational History    Not on file   Tobacco Use    Smoking status: Never    Smokeless tobacco: Never    Tobacco comments:     n/a   Vaping Use    Vaping status: Never Used   Substance and Sexual Activity    Alcohol use: Yes     Alcohol/week: 1.8 oz     Types: 1 Glasses of wine, 1 Cans of beer, 1 Standard drinks or equivalent per week     Comment: 2-3 drinks a month    Drug use: Never    Sexual activity: Not Currently     Partners: Female     Comment: n/a   Other Topics Concern    Not on file   Social History Narrative    Not on file     Social Drivers of Health     Financial Resource Strain: Low Risk  (9/10/2024)    Overall Financial Resource Strain (CARDIA)     Difficulty of Paying Living Expenses: Not hard at all   Food Insecurity: No Food Insecurity (9/10/2024)    Hunger Vital Sign     Worried About Running Out of Food in the Last Year: Never true     Ran Out of Food in the Last Year: Never true   Transportation Needs: No Transportation Needs (9/10/2024)    PRAPARE - Transportation     Lack of Transportation (Medical): No     Lack of Transportation (Non-Medical): No   Physical Activity: Sufficiently Active (9/10/2024)    Exercise Vital Sign     Days of Exercise per Week: 5 days     Minutes of Exercise per Session: 50 min   Stress: No Stress Concern Present (9/10/2024)    [employer] - Occupational Stress Questionnaire     Feeling of Stress : Only a little   Social Connections: Moderately Isolated (9/10/2024)    Social Connection and Isolation Panel [NHANES]     Frequency of Communication with Friends and Family: Three times a week     Frequency of Social Gatherings with Friends and Family: Twice a week     Attends [employer] Services: Never     Active Member of Clubs or  "Organizations: Yes     Attends Club or Organization Meetings: 1 to 4 times per year     Marital Status: Never    Intimate Partner Violence: Not on file   Housing Stability: Low Risk  (9/10/2024)    Housing Stability Vital Sign     Unable to Pay for Housing in the Last Year: No     Number of Times Moved in the Last Year: 0     Homeless in the Last Year: No      Social History     Tobacco Use   Smoking Status Never   Smokeless Tobacco Never   Tobacco Comments    n/a     Social History     Substance and Sexual Activity   Alcohol Use Yes    Alcohol/week: 1.8 oz    Types: 1 Glasses of wine, 1 Cans of beer, 1 Standard drinks or equivalent per week    Comment: 2-3 drinks a month     Social History     Substance and Sexual Activity   Drug Use Never        Family History   Problem Relation Age of Onset    Breast Cancer Mother     Tubal Cancer Mother     Ovarian Cancer Mother     Cancer Mother         breast cancer    Heart Disease Father     Cancer Father         intestine cancer    Cancer Brother          of leukemia at age 3    Peritoneal Cancer Neg Hx     Colorectal Cancer Neg Hx        Review of Systems   Musculoskeletal:  Positive for joint pain.   Neurological:  Negative for tingling, sensory change and weakness.        Objective:   /82 (BP Location: Right arm, Patient Position: Sitting, BP Cuff Size: Adult)   Pulse 70   Temp 36.2 °C (97.2 °F) (Temporal)   Ht 1.778 m (5' 10\")   Wt 87 kg (191 lb 12.8 oz)   SpO2 97%   BMI 27.52 kg/m²     Physical Exam  Constitutional:       General: He is not in acute distress.     Appearance: Normal appearance.   HENT:      Head: Normocephalic and atraumatic.      Right Ear: External ear normal.      Left Ear: External ear normal.      Nose: Nose normal.      Mouth/Throat:      Mouth: Mucous membranes are moist.   Eyes:      Extraocular Movements: Extraocular movements intact.      Conjunctiva/sclera: Conjunctivae normal.   Cardiovascular:      Rate and Rhythm: " Normal rate.      Pulses: Normal pulses.   Pulmonary:      Effort: Pulmonary effort is normal. No respiratory distress.   Musculoskeletal:      Cervical back: Normal range of motion and neck supple.   Skin:     General: Skin is warm and dry.      Capillary Refill: Capillary refill takes less than 2 seconds.   Neurological:      Mental Status: He is alert and oriented to person, place, and time.   Psychiatric:         Mood and Affect: Mood normal.         Behavior: Behavior normal.       Knee Exam -   left knee: Overlying skin demonstrates no erythema, ecchymoses or wounds. Knee ROM is full.  SILT spn, dpn, plantar and sural nerves. Motor intact to knee extension, ankle dorsiflexion, ankle plantar flexion, and eversion. DP/PT pulse 2+. There is tenderness at the medial joint line. negative Lachman. negative Grisel. stable varus/valgus stress.         Imaging:  Multiple views of the left knee demonstrates mild degenerative changes including joint space narrowing and periarticular osteophytes of the medial compartment of the left. There are no acute fractures or destructive osseous lesions.    Multiple views of the pelvis and left hip demonstrates mild degenerative changes including minimal joint space narrowing and small calcification within the labrum. There are no acute fractures or destructive osseous lesions.             Diagnosis:     1. Primary osteoarthritis of left knee                Assessment/Plan:   I counseled the patient regarding the above diagnosis.  We reviewed the natural history and etiology of knee osteoarthritis. We discussed conservative and surgical treatment options in detail. Conservative treatment options include weight loss, low impact exercise (pool, bike, etc), oral NSAIDs, corticosteroid injections, bracing, and ablative procedures of the sensory nerves to the joint. Surgical treatment includes joint arthroplasty.   They would like to try corticosteroid injections.      I counseled him  that unfortunately we are out of steroid in the clinic today.  We just moved offices last week and have not gotten her new supply.  As soon as we get her shipment and we will call him to come in for an injection appointment.    All questions were answered and they were in agreement with the plan.     Referrals: none  Restrictions: none  New medications/refills: none  Imaging studies: none  Follow-up: for injection      Jhoan Lopez, DO  Orthopedic Oncology and General Orthopedics

## 2025-03-10 ENCOUNTER — OFFICE VISIT (OUTPATIENT)
Facility: MEDICAL CENTER | Age: 75
End: 2025-03-10
Payer: COMMERCIAL

## 2025-03-10 VITALS
SYSTOLIC BLOOD PRESSURE: 110 MMHG | BODY MASS INDEX: 27.46 KG/M2 | DIASTOLIC BLOOD PRESSURE: 80 MMHG | TEMPERATURE: 96.9 F | HEIGHT: 70 IN | WEIGHT: 191.8 LBS | OXYGEN SATURATION: 96 % | HEART RATE: 68 BPM

## 2025-03-10 DIAGNOSIS — M17.12 PRIMARY OSTEOARTHRITIS OF LEFT KNEE: ICD-10-CM

## 2025-03-10 PROCEDURE — 3074F SYST BP LT 130 MM HG: CPT | Performed by: ORTHOPAEDIC SURGERY

## 2025-03-10 PROCEDURE — 3079F DIAST BP 80-89 MM HG: CPT | Performed by: ORTHOPAEDIC SURGERY

## 2025-03-10 PROCEDURE — 20610 DRAIN/INJ JOINT/BURSA W/O US: CPT | Mod: LT | Performed by: ORTHOPAEDIC SURGERY

## 2025-03-10 RX ORDER — DICLOFENAC SODIUM AND MISOPROSTOL 50; 200 MG/1; UG/1
1 TABLET, DELAYED RELEASE ORAL 2 TIMES DAILY
Qty: 60 TABLET | Refills: 2 | Status: SHIPPED | OUTPATIENT
Start: 2025-03-10

## 2025-03-10 ASSESSMENT — FIBROSIS 4 INDEX: FIB4 SCORE: 1.67

## 2025-03-10 NOTE — PROGRESS NOTES
Bon returned for follow up today for left knee intra articular injection as planned. Please see separate procedure note.     He is also interested in trying an oral NSAID. I sent a prescription for diclofenac-misoprostol to his pharmacy. Risks and benefits were discussed.     Left knee is without swelling, erythema or effusion.     F/u as needed. Injections can be done q 3-4 months.       Jhoan Lopez, DO  Orthopedic Oncology and General Orthopedics   [facility]

## 2025-03-10 NOTE — PROCEDURES
Procedure: Intra-articular corticosteroid injection left knee    Diagnosis: left knee osteoarthritis    Consent: Verbal consent obtained from patient. We discussed risks of infection, blood loss, damage to nerves/vessels in the area, and pain.     Performed by: Jhoan Lopez DO    Procedure details:  A time out was held to confirm the site and patient. The site was sterilized with chloraprep swabs. Then a mixture of 4 cc of 1% lidocaine and 1 cc of 40 mg/ml of kenalog was injected into the knee through the anterolateral portal. A band aid was placed. The patient tolerated the procedure well. They were instructed on signs of infection and to call should any of these symptoms occur.    Jhoan Lopez DO  Orthopedic Oncology and General Orthopedics   [facility]

## 2025-03-20 ENCOUNTER — HOSPITAL ENCOUNTER (OUTPATIENT)
Dept: RADIOLOGY | Facility: MEDICAL CENTER | Age: 75
End: 2025-03-20
Attending: UROLOGY
Payer: COMMERCIAL

## 2025-03-20 DIAGNOSIS — R35.1 BENIGN PROSTATIC HYPERPLASIA WITH NOCTURIA: ICD-10-CM

## 2025-03-20 DIAGNOSIS — N40.1 BENIGN PROSTATIC HYPERPLASIA WITH NOCTURIA: ICD-10-CM

## 2025-03-20 PROCEDURE — 76775 US EXAM ABDO BACK WALL LIM: CPT

## 2025-03-21 ENCOUNTER — TELEPHONE (OUTPATIENT)
Dept: UROLOGY | Facility: MEDICAL CENTER | Age: 75
End: 2025-03-21
Payer: COMMERCIAL

## 2025-03-21 DIAGNOSIS — N13.8 BPH WITH OBSTRUCTION/LOWER URINARY TRACT SYMPTOMS: ICD-10-CM

## 2025-03-21 DIAGNOSIS — R35.0 URINARY FREQUENCY: ICD-10-CM

## 2025-03-21 DIAGNOSIS — N32.81 OVERACTIVE BLADDER: ICD-10-CM

## 2025-03-21 DIAGNOSIS — R35.1 NOCTURIA: ICD-10-CM

## 2025-03-21 DIAGNOSIS — N40.1 BPH WITH OBSTRUCTION/LOWER URINARY TRACT SYMPTOMS: ICD-10-CM

## 2025-03-21 NOTE — TELEPHONE ENCOUNTER
I called Mr. Jacobs today to discuss his bladder and prostate ultrasound results, which demonstrated a minimal PVR and prostate volume of only 40 cc. We discussed these findings and his ongoing symptoms; he's most bothered by storage symptoms including frequency and nocturia. His obstructive symptoms (decreased flow) are uncommon, and occur only about 1/3 of his nighttime voids, but not during the day.     We discussed treatment options, and agreed to a trial of a beta-3 agonist. We'll start with vibegron 75 mg daily. He'll update me on his symptoms via MyChart and follow up as scheduled.

## 2025-04-04 ENCOUNTER — OFFICE VISIT (OUTPATIENT)
Dept: MEDICAL GROUP | Facility: MEDICAL CENTER | Age: 75
End: 2025-04-04
Payer: COMMERCIAL

## 2025-04-04 VITALS
HEART RATE: 66 BPM | TEMPERATURE: 97.3 F | SYSTOLIC BLOOD PRESSURE: 124 MMHG | DIASTOLIC BLOOD PRESSURE: 70 MMHG | BODY MASS INDEX: 27.2 KG/M2 | RESPIRATION RATE: 18 BRPM | OXYGEN SATURATION: 97 % | WEIGHT: 190 LBS | HEIGHT: 70 IN

## 2025-04-04 DIAGNOSIS — Z12.5 PROSTATE CANCER SCREENING: ICD-10-CM

## 2025-04-04 DIAGNOSIS — R35.0 URINARY FREQUENCY: ICD-10-CM

## 2025-04-04 DIAGNOSIS — E78.5 DYSLIPIDEMIA: ICD-10-CM

## 2025-04-04 DIAGNOSIS — Z00.00 WELLNESS EXAMINATION: ICD-10-CM

## 2025-04-04 PROBLEM — G89.29 CHRONIC PAIN OF LEFT KNEE: Status: ACTIVE | Noted: 2019-07-01

## 2025-04-04 PROBLEM — M25.562 CHRONIC PAIN OF LEFT KNEE: Status: ACTIVE | Noted: 2019-07-01

## 2025-04-04 PROCEDURE — 3078F DIAST BP <80 MM HG: CPT | Performed by: STUDENT IN AN ORGANIZED HEALTH CARE EDUCATION/TRAINING PROGRAM

## 2025-04-04 PROCEDURE — 99214 OFFICE O/P EST MOD 30 MIN: CPT | Performed by: STUDENT IN AN ORGANIZED HEALTH CARE EDUCATION/TRAINING PROGRAM

## 2025-04-04 PROCEDURE — 3074F SYST BP LT 130 MM HG: CPT | Performed by: STUDENT IN AN ORGANIZED HEALTH CARE EDUCATION/TRAINING PROGRAM

## 2025-04-04 ASSESSMENT — FIBROSIS 4 INDEX: FIB4 SCORE: 1.67

## 2025-04-04 ASSESSMENT — PAIN SCALES - GENERAL: PAINLEVEL_OUTOF10: 10=SEVERE PAIN

## 2025-04-09 NOTE — PROGRESS NOTES
Subjective:     CC:   Chief Complaint   Patient presents with    Follow-Up     Left knee problem since August (Has been doing PT and seeing Orthopedics)       HPI:   Heber presents today with      Verbal consent was acquired by the patient to use [employer] ambient listening note generation during this visit Yes   History of Present Illness  The patient presents for evaluation of left knee pain, bladder issues, and iron deficiency.    He underwent radiographic imaging of his left knee in 09/2024, which revealed arthritic changes. He was subsequently referred to a specialist, whom he consulted twice, with the most recent visit on 03/10/2025. During this visit, he received a steroid injection and was prescribed diclofenac with misoprostol. Since then, he has experienced significant improvement in his symptoms, with the exception of a minor setback. He reports no heartburn. He was advised to take the medication with food. He has been adhering to the prescribed physical therapy regimen and maintains an active lifestyle, including regular gym attendance. He has discontinued stair climbing and running due to his knee condition. He is a member of a gym for 10 years and goes there at least once a week. He is considering incorporating swimming into his exercise routine. He also reports that his right knee began to cause discomfort in mid-02/2025, likely due to compensatory limping. However, this issue has largely resolved with the initiation of the medication. He also experienced soreness in his right elbow approximately 3 months ago, which persisted for 2 to 3 weeks before resolving. The final area to heal was the lower part of his triceps.    Approximately 1 to 2 years ago, he was diagnosed with iron deficiency. He is uncertain about the cause of this deficiency but recalls that it was corrected with a month-long course of medication.    He initiated Gemtesa therapy 6 days ago for bladder issues. He reports that the  "medication has been effective in reducing his nighttime bathroom visits to once per night. He takes Gemtesa at night along with simvastatin. He has discontinued Flomax due to its ineffectiveness. His physician, Dr. Gomes, has expressed concerns about potential links between certain medications and dementia, and thus, he has not been prescribed these alternatives.    He takes a multivitamin supplement and vitamin E. He also takes 3000 units of vitamin C daily as a preventative measure against COVID-19. He is currently on simvastatin and omeprazole.    FAMILY HISTORY  - Mother had breast cancer and had her ovaries removed    MEDICATIONS  - Current: diclofenac with misoprostol, Gemtesa, simvastatin, omeprazole, multivitamin, vitamin E, vitamin C  - Discontinued: Flomax    Results  Laboratory Studies  Kidney numbers were fine. Hemoglobin around 15 for last 3 years. Cholesterol levels are normal. A1c levels are good for last 2 years. Vitamin D levels were good in the past.    Imaging  X-rays of the left knee showed arthritis.     Health Maintenance: Completed    ROS:  ROS    Review of systems unremarkable except for concerns noted by patient or items listed.    Please see HPI for additional ROS.      Objective:     Exam:  /70   Pulse 66   Temp 36.3 °C (97.3 °F) (Temporal)   Resp 18   Ht 1.778 m (5' 10\")   Wt 86.2 kg (190 lb)   SpO2 97%   BMI 27.26 kg/m²  Body mass index is 27.26 kg/m².    Physical Exam  Constitutional:       General: He is not in acute distress.     Appearance: Normal appearance.   HENT:      Head: Normocephalic.   Cardiovascular:      Rate and Rhythm: Normal rate and regular rhythm.      Pulses: Normal pulses.      Heart sounds: Normal heart sounds.   Pulmonary:      Effort: Pulmonary effort is normal.      Breath sounds: Normal breath sounds.   Skin:     General: Skin is warm.   Neurological:      Mental Status: He is alert and oriented to person, place, and time.   Psychiatric:         " Mood and Affect: Mood normal.         Behavior: Behavior normal.             Labs: Reviewed    Assessment & Plan:     75 y.o. male with the following -         Assessment & Plan  1. Left knee pain  Chronic, stable  - Attributed to  degenerative changes observed on x-ray  - Received steroid injection on 03/10/2025, providing significant relief  - Currently taking diclofenac with misoprostol, effective in managing symptoms  - Advised to continue home exercises and physical therapy  - Recommended to avoid steps, running, and treadmill  - Swimming recommended for joint health  - Take diclofenac with misoprostol after meals to minimize gastrointestinal side effects  - Pepcid can be taken as needed for heartburn  - Standing order for metabolic panel in September 2025 to monitor kidney function due to long-term use of diclofenac  - Consider another steroid injection if pain worsens after 3 months    2. Bladder issues  Increased urinary frequency  Chronic, stable  - Started taking Gemtesa approximately 6 days ago for nocturia  - Reports mixed results, with some nights showing improvement and others not  - Advised to continue taking Gemtesa at the same time every day, preferably at night    3.  Dyslipidemia  Chronic, stable        Lab Results   Component Value Date/Time     CHOLSTRLTOT 164 09/11/2024 0930     TRIGLYCERIDE 107 09/11/2024 0930     HDL 48 09/11/2024 0930     LDL 95 09/11/2024 0930      The 10-year ASCVD risk score (Parker Ford DK, et al., 2019) is: 15.6%   Taking Zocor 40 mg daily   Denies any side effects, tolerating well   Plan:  Continue Zocor 40 mg daily   Continue healthy diet and exercise      iron deficiency:  - Episode of iron deficiency in 2021, managed with medication  - Hemoglobin levels stable around 15 since then  - If hemoglobin levels drop again, iron labs will be added to investigate potential gastrointestinal causes     Health maintenance:  - Cholesterol levels within normal limits, currently on  simvastatin  - A1C levels stable for the past 2 years, no diabetes  - PSA levels normal, will be monitored annually  - Continue taking omeprazole in the morning to protect gastrointestinal tract due to diclofenac use  - Advised to limit intake of vitamin E and omega-3 supplements, obtain nutrients through dietary sources (salmon, sardines, enrike seeds, flaxseeds)  - Chemistry panel, hemoglobin check, cholesterol check, and PSA test ordered for September 2025  - CBC WITHOUT DIFFERENTIAL; Future  - Lipid Profile; Future  - Comp Metabolic Panel; Future  - HEMOGLOBIN A1C; Future    Prostate cancer screening  - PROSTATE SPECIFIC AG SCREENING; Future      PROCEDURE  The patient received a steroid injection in the left knee during a visit on 03/10/2025.        Please note that this dictation was created using voice recognition software. I have made every reasonable attempt to correct obvious errors, but I expect that there are errors of grammar and possibly content that I did not discover before finalizing the note.

## 2025-05-21 DIAGNOSIS — K22.719 BARRETT'S ESOPHAGUS WITH DYSPLASIA: ICD-10-CM

## 2025-05-23 RX ORDER — OMEPRAZOLE 20 MG/1
CAPSULE, DELAYED RELEASE ORAL
Qty: 90 CAPSULE | Refills: 1 | Status: SHIPPED | OUTPATIENT
Start: 2025-05-23

## 2025-07-14 DIAGNOSIS — K22.719 BARRETT'S ESOPHAGUS WITH DYSPLASIA: ICD-10-CM

## 2025-07-14 DIAGNOSIS — E78.5 DYSLIPIDEMIA: ICD-10-CM

## 2025-07-14 DIAGNOSIS — R35.0 URINARY FREQUENCY: ICD-10-CM

## 2025-07-14 DIAGNOSIS — N13.8 BPH WITH OBSTRUCTION/LOWER URINARY TRACT SYMPTOMS: ICD-10-CM

## 2025-07-14 DIAGNOSIS — N40.1 BPH WITH OBSTRUCTION/LOWER URINARY TRACT SYMPTOMS: ICD-10-CM

## 2025-07-14 DIAGNOSIS — N32.81 OVERACTIVE BLADDER: ICD-10-CM

## 2025-07-14 NOTE — TELEPHONE ENCOUNTER
Received request via: Pharmacy    Was the patient seen in the last year in this department? Yes    Does the patient have an active prescription (recently filled or refills available) for medication(s) requested? PLEASE SEND TO MAIL ORDER PHARMACY     Pharmacy Name: [facility]    Does the patient have shelter Plus and need 100-day supply? (This applies to ALL medications) Patient does not have SCP

## 2025-07-15 ENCOUNTER — APPOINTMENT (OUTPATIENT)
Dept: URBAN - METROPOLITAN AREA CLINIC 6 | Facility: CLINIC | Age: 75
Setting detail: DERMATOLOGY
End: 2025-07-15

## 2025-07-15 DIAGNOSIS — Q82.5 CONGENITAL NON-NEOPLASTIC NEVUS: ICD-10-CM

## 2025-07-15 DIAGNOSIS — L82.1 OTHER SEBORRHEIC KERATOSIS: ICD-10-CM

## 2025-07-15 DIAGNOSIS — L81.4 OTHER MELANIN HYPERPIGMENTATION: ICD-10-CM

## 2025-07-15 DIAGNOSIS — D22 MELANOCYTIC NEVI: ICD-10-CM

## 2025-07-15 DIAGNOSIS — Z71.89 OTHER SPECIFIED COUNSELING: ICD-10-CM

## 2025-07-15 DIAGNOSIS — D18.0 HEMANGIOMA: ICD-10-CM

## 2025-07-15 DIAGNOSIS — L57.0 ACTINIC KERATOSIS: ICD-10-CM

## 2025-07-15 PROBLEM — D22.61 MELANOCYTIC NEVI OF RIGHT UPPER LIMB, INCLUDING SHOULDER: Status: ACTIVE | Noted: 2025-07-15

## 2025-07-15 PROBLEM — D22.5 MELANOCYTIC NEVI OF TRUNK: Status: ACTIVE | Noted: 2025-07-15

## 2025-07-15 PROBLEM — D22.62 MELANOCYTIC NEVI OF LEFT UPPER LIMB, INCLUDING SHOULDER: Status: ACTIVE | Noted: 2025-07-15

## 2025-07-15 PROBLEM — D22.72 MELANOCYTIC NEVI OF LEFT LOWER LIMB, INCLUDING HIP: Status: ACTIVE | Noted: 2025-07-15

## 2025-07-15 PROBLEM — D22.71 MELANOCYTIC NEVI OF RIGHT LOWER LIMB, INCLUDING HIP: Status: ACTIVE | Noted: 2025-07-15

## 2025-07-15 PROBLEM — D18.01 HEMANGIOMA OF SKIN AND SUBCUTANEOUS TISSUE: Status: ACTIVE | Noted: 2025-07-15

## 2025-07-15 PROCEDURE — ? COUNSELING

## 2025-07-15 PROCEDURE — ? LIQUID NITROGEN

## 2025-07-15 RX ORDER — OMEPRAZOLE 20 MG/1
20 CAPSULE, DELAYED RELEASE ORAL DAILY
Qty: 90 CAPSULE | Refills: 1 | Status: SHIPPED | OUTPATIENT
Start: 2025-07-15

## 2025-07-15 RX ORDER — SIMVASTATIN 40 MG
40 TABLET ORAL EVERY EVENING
Qty: 90 TABLET | Refills: 3 | Status: SHIPPED | OUTPATIENT
Start: 2025-07-15

## 2025-07-15 ASSESSMENT — LOCATION SIMPLE DESCRIPTION DERM
LOCATION SIMPLE: LEFT CALF
LOCATION SIMPLE: RIGHT LOWER BACK
LOCATION SIMPLE: RIGHT UPPER BACK
LOCATION SIMPLE: CHEST
LOCATION SIMPLE: RIGHT ZYGOMA
LOCATION SIMPLE: RIGHT UPPER ARM
LOCATION SIMPLE: LEFT CHEEK
LOCATION SIMPLE: LEFT FOREARM
LOCATION SIMPLE: LEFT THIGH
LOCATION SIMPLE: RIGHT FOREARM
LOCATION SIMPLE: LEFT POSTERIOR THIGH
LOCATION SIMPLE: LEFT UPPER ARM
LOCATION SIMPLE: RIGHT THIGH
LOCATION SIMPLE: RIGHT POSTERIOR THIGH
LOCATION SIMPLE: RIGHT CALF
LOCATION SIMPLE: LEFT UPPER BACK

## 2025-07-15 ASSESSMENT — LOCATION DETAILED DESCRIPTION DERM
LOCATION DETAILED: LEFT ANTERIOR PROXIMAL THIGH
LOCATION DETAILED: RIGHT PROXIMAL DORSAL FOREARM
LOCATION DETAILED: RIGHT CENTRAL ZYGOMA
LOCATION DETAILED: LEFT DISTAL POSTERIOR THIGH
LOCATION DETAILED: RIGHT PROXIMAL CALF
LOCATION DETAILED: LEFT PROXIMAL CALF
LOCATION DETAILED: LEFT ANTERIOR DISTAL UPPER ARM
LOCATION DETAILED: RIGHT SUPERIOR LATERAL MIDBACK
LOCATION DETAILED: LEFT ANTERIOR DISTAL THIGH
LOCATION DETAILED: RIGHT DISTAL POSTERIOR THIGH
LOCATION DETAILED: LEFT INFERIOR UPPER BACK
LOCATION DETAILED: RIGHT VENTRAL PROXIMAL FOREARM
LOCATION DETAILED: RIGHT ANTERIOR PROXIMAL THIGH
LOCATION DETAILED: RIGHT ANTERIOR DISTAL UPPER ARM
LOCATION DETAILED: LEFT CENTRAL MALAR CHEEK
LOCATION DETAILED: LEFT VENTRAL DISTAL FOREARM
LOCATION DETAILED: RIGHT MEDIAL INFERIOR CHEST
LOCATION DETAILED: LEFT INFERIOR CENTRAL MALAR CHEEK
LOCATION DETAILED: RIGHT MID-UPPER BACK
LOCATION DETAILED: RIGHT VENTRAL DISTAL FOREARM
LOCATION DETAILED: LEFT PROXIMAL DORSAL FOREARM

## 2025-07-15 ASSESSMENT — LOCATION ZONE DERM
LOCATION ZONE: LEG
LOCATION ZONE: TRUNK
LOCATION ZONE: ARM
LOCATION ZONE: FACE

## 2025-07-15 NOTE — PROCEDURE: LIQUID NITROGEN
Post-Care Instructions: I reviewed with the patient in detail post-care instructions. Patient is to wear sunprotection, and avoid picking at any of the treated lesions. Pt may apply Vaseline to crusted or scabbing areas.
Consent: The patient's consent was obtained including but not limited to risks of crusting, scabbing, blistering, scarring, darker or lighter pigmentary change, recurrence, incomplete removal and infection.
Detail Level: Detailed
Duration Of Freeze Thaw-Cycle (Seconds): 10
Show Aperture Variable?: Yes
Application Tool (Optional): Liquid Nitrogen Sprayer
Render Note In Bullet Format When Appropriate: No
Number Of Freeze-Thaw Cycles: 3 freeze-thaw cycles

## 2025-07-25 DIAGNOSIS — M17.12 PRIMARY OSTEOARTHRITIS OF LEFT KNEE: ICD-10-CM

## 2025-07-25 RX ORDER — DICLOFENAC SODIUM AND MISOPROSTOL 50; 200 MG/1; UG/1
1 TABLET, DELAYED RELEASE ORAL 2 TIMES DAILY
Qty: 60 TABLET | Refills: 2 | Status: SHIPPED | OUTPATIENT
Start: 2025-07-25

## 2025-07-31 ENCOUNTER — OFFICE VISIT (OUTPATIENT)
Dept: UROLOGY | Facility: MEDICAL CENTER | Age: 75
End: 2025-07-31
Payer: COMMERCIAL

## 2025-07-31 DIAGNOSIS — N13.8 BPH WITH OBSTRUCTION/LOWER URINARY TRACT SYMPTOMS: ICD-10-CM

## 2025-07-31 DIAGNOSIS — N32.81 OVERACTIVE BLADDER: Primary | ICD-10-CM

## 2025-07-31 DIAGNOSIS — N40.1 BPH WITH OBSTRUCTION/LOWER URINARY TRACT SYMPTOMS: ICD-10-CM

## 2025-07-31 LAB
POC POST-VOID: 116 ML
POC PRE-VOID: NORMAL

## 2025-07-31 NOTE — PROGRESS NOTES
Chief Complaint: lower urinary tract symptoms, nocturia    HPI: Heber Jacobs is a 75 y.o. male with a history of urinary frequency, urgency, and nocturia, as well as some episodes of bothersome decreased urinary flow, here today for follow up. We first met 6 months ago, and at the time obtained a bladder ultrasound that demonstrated a prostate volume of about 40 cc with a minimal post-void residual, and after discussing his treatment options agreed to start use of vibegron 75 mg daily as he was most bothered by his OAB symptoms. He started taking this in late March  (4 months ago) and now says he's doing well with about 20% improvement. He has fewer urges to void, including while he's working (he works part time as a , so avoiding urges is very important). He also has improved nocturia. He's not noticed any side effects.     Past Medical History:  Past Medical History[1]    Past Surgical History:  Past Surgical History[2]    Family History:  Family History   Problem Relation Age of Onset    Breast Cancer Mother     Tubal Cancer Mother     Ovarian Cancer Mother     Cancer Mother         breast cancer    Heart Disease Father     Cancer Father         intestine cancer    Cancer Brother          of leukemia at age 3    Peritoneal Cancer Neg Hx     Colorectal Cancer Neg Hx        Social History:  Social History     Socioeconomic History    Marital status: Single     Spouse name: Not on file    Number of children: Not on file    Years of education: Not on file    Highest education level: Bachelor's degree (e.g., BA, AB, BS)   Occupational History    Not on file   Tobacco Use    Smoking status: Never    Smokeless tobacco: Never    Tobacco comments:     n/a   Vaping Use    Vaping status: Never Used   Substance and Sexual Activity    Alcohol use: Yes     Alcohol/week: 1.8 oz     Types: 1 Glasses of wine, 1 Cans of beer, 1 Standard drinks or equivalent per week     Comment: 2-3 drinks a month     Drug use: Never    Sexual activity: Not Currently     Partners: Female     Comment: n/a   Other Topics Concern    Not on file   Social History Narrative    Not on file     Social Drivers of Health     Financial Resource Strain: Low Risk  (9/10/2024)    Overall Financial Resource Strain (CARDIA)     Difficulty of Paying Living Expenses: Not hard at all   Food Insecurity: No Food Insecurity (9/10/2024)    Hunger Vital Sign     Worried About Running Out of Food in the Last Year: Never true     Ran Out of Food in the Last Year: Never true   Transportation Needs: No Transportation Needs (9/10/2024)    PRAPARE - Transportation     Lack of Transportation (Medical): No     Lack of Transportation (Non-Medical): No   Physical Activity: Sufficiently Active (9/10/2024)    Exercise Vital Sign     Days of Exercise per Week: 5 days     Minutes of Exercise per Session: 50 min   Stress: No Stress Concern Present (9/10/2024)    [employer] [employer] - Occupational Stress Questionnaire     Feeling of Stress : Only a little   Social Connections: Moderately Isolated (9/10/2024)    Social Connection and Isolation Panel [NHANES]     Frequency of Communication with Friends and Family: Three times a week     Frequency of Social Gatherings with Friends and Family: Twice a week     Attends Yarsani Services: Never     Active Member of Clubs or Organizations: Yes     Attends Club or Organization Meetings: 1 to 4 times per year     Marital Status: Never    Intimate Partner Violence: Not on file   Housing Stability: Low Risk  (9/10/2024)    Housing Stability Vital Sign     Unable to Pay for Housing in the Last Year: No     Number of Times Moved in the Last Year: 0     Homeless in the Last Year: No       Medications:  Current Medications[3]    Allergies:  Allergies[4]    Review of Systems:  Constitutional: Negative for fever, chills and malaise/fatigue.   HENT: Negative for congestion.    Eyes: Negative for pain.  "  Respiratory: Negative for cough and shortness of breath.    Cardiovascular: Negative for leg swelling.   Gastrointestinal: Negative for nausea, vomiting, abdominal pain and diarrhea.   Genitourinary: Negative for dysuria and hematuria.   Skin: Negative for rash.   Neurological: Negative for dizziness, focal weakness and headaches.   Endo/Heme/Allergies: Does not bruise/bleed easily.   Psychiatric/Behavioral: Negative for depression.  The patient is not nervous/anxious.        Physical Exam:  There were no vitals filed for this visit.    GENERAL: well appearing, well nourished, NAD  RESP: respiratory effort normal  ABDOMEN: soft, nontender, nondistended, no masses or organomegaly  SKIN/LYMPH: normal coloration and turgor, no suspicious skin lesions noted  NEURO/PSYCH: alert, oriented, normal speech, no focal findings or movement disorder noted  EXTREMITIES: no pedal edema noted    PVR: 116 mL  (bladder scanner)     Data Review:    Labs:  POCT UA No results found for: \"POCCOLOR\", \"POCAPPEAR\", \"POCLEUKEST\", \"POCNITRITE\", \"POCUROBILIGE\", \"POCPROTEIN\", \"POCURPH\", \"POCBLOOD\", \"POCSPGRV\", \"POCKETONES\", \"POCBILIRUBIN\", \"POCGLUCUA\"   CBC   Lab Results   Component Value Date/Time    WBC 5.3 09/11/2024 0930    RBC 4.54 (L) 09/11/2024 0930    HEMOGLOBIN 15.8 09/11/2024 0930    HEMATOCRIT 45.5 09/11/2024 0930    .2 (H) 09/11/2024 0930    MCH 34.8 (H) 09/11/2024 0930    MCHC 34.7 09/11/2024 0930    RDW 46.5 09/11/2024 0930    MPV 9.5 09/11/2024 0930    LYMPHOCYTES 33.80 07/14/2022 0910    LYMPHS 1.57 07/14/2022 0910    MONOCYTES 9.90 07/14/2022 0910    MONOS 0.46 07/14/2022 0910    EOSINOPHILS 3.70 07/14/2022 0910    EOS 0.17 07/14/2022 0910    BASOPHILS 0.40 07/14/2022 0910    BASO 0.02 07/14/2022 0910    NRBC 0.00 07/14/2022 0910       CMP   Lab Results   Component Value Date/Time    SODIUM 140 09/11/2024 0930    POTASSIUM 4.8 09/11/2024 0930    CHLORIDE 104 09/11/2024 0930    CO2 25 09/11/2024 0930    ANION 11.0 " 09/11/2024 0930    GLUCOSE 66 09/11/2024 0930    BUN 15 09/11/2024 0930    CREATININE 0.85 09/11/2024 0930    GFRCKD 92 09/11/2024 0930    CALCIUM 9.7 09/11/2024 0930    CORRCALC 9.4 09/11/2024 0930    ASTSGOT 22 09/11/2024 0930    ALTSGPT 19 09/11/2024 0930    ALKPHOSPHAT 70 09/11/2024 0930    TBILIRUBIN 0.8 09/11/2024 0930    ALBUMIN 4.4 09/11/2024 0930    TOTPROTEIN 6.9 09/11/2024 0930    GLOBULIN 2.5 09/11/2024 0930    AGRATIO 1.8 09/11/2024 0930     INFERTILITY   Lab Results   Component Value Date/Time    TESTOSTERONE 472 02/21/2018 0814     PSA   Lab Results   Component Value Date/Time    PSATOTAL 3.63 09/11/2024 0930       Imaging:   US-RENAL  Order: [id number]   Status: Final result       Next appt: Today at 02:30 PM in Urology (Olu Lu M.D.)       Dx: Benign prostatic hyperplasia with noc...    Test Result Released: Yes (seen)    0 Result Notes  Details    Reading Physician Reading Date Result Priority   Socrates Nunn M.D.  [phone] 3/20/2025      Narrative & Impression     3/20/2025 9:14 AM     HISTORY/REASON FOR EXAM:  please evaluate prostate volume and post-void residual  Prostatic hypertrophy and nocturia     TECHNIQUE/EXAM DESCRIPTION:  Renal ultrasound.     COMPARISON:  None     FINDINGS:     The right kidney measures 10.93 cm.  The right kidney appears normal in contour and parenchymal echotexture. The corticomedullary differentiation is preserved. The right renal collecting system is not dilated. No hydronephrosis. There are no renal   calculi.     The left kidney measures 11.74 cm. The left kidney appears normal in contour and parenchymal echotexture. The corticomedullary differentiation is preserved. The left renal collecting system is not dilated. No hydronephrosis. There are no renal calculi.     The bladder demonstrates no focal wall abnormality. Prostate gland is enlarged measuring 39.7 cc. Small post void residual is noted.     Incidental cyst posterior right lobe of liver  measures 3 cm. No follow-up recommended.     IMPRESSION:     1.  No hydronephrosis.     2.  Some enlargement of the prostate gland is present.       Assessment: 75 y.o.male with a history of mild prostatic hypertrophy and obstructive urinary symptoms (decreased flow at times at night) and more botherome urinary urgency, frequency, and nocturia here for follow up after starting treatment with a beta-3 agonist (vibegron). He feels well overall, with some improvement in urgency and frequency. Happy to continue with this plan, but we did discuss potential 3rd line therapy for OAB today.       Plan:    -Continue vibegron 75 mg daily  -Limit intake of dietary bladder irritants (coffee, tea, chocolate, carbonated beverages, spicy foods, and alcohol)  -If symptoms progress, would recommend urodynamic studies to evaluate for possible 3rd line therapy  -Follow up in 1 year or sooner as needed      Olu Lu MD         [1]   Past Medical History:  Diagnosis Date    Allergy     Arthritis     [place] esophagus     Cataract     GERD (gastroesophageal reflux disease)     Hemorrhoid     Hyperlipidemia     Nocturia    [2]   Past Surgical History:  Procedure Laterality Date    KNEE ARTHROSCOPY  09/2013    right knee meniscus tear    EXPLORATORY LAPAROTOMY      HERNIA REPAIR     [3]   Current Outpatient Medications   Medication Sig Dispense Refill    diclofenac-misoprostol (ARTHROTEC 50) 50-0.2 MG per tablet Take 1 Tablet by mouth 2 times a day. 60 Tablet 2    omeprazole (PRILOSEC) 20 MG delayed-release capsule Take 1 Capsule by mouth every day. 90 Capsule 1    simvastatin (ZOCOR) 40 MG Tab Take 1 Tablet by mouth every evening. 90 Tablet 3    vibegron (GEMTESA) 75 MG tablet Take 1 Tablet by mouth every day. 30 Tablet 11    Hyoscyamine Sulfate (HYOSCYAMINE PO) Take  by mouth. Once every 4 months      Ascorbic Acid (VITAMIN C PO)       Cholecalciferol (VITAMIN D3 PO)       Fiber Powder Take 1 Each by mouth every day.       therapeutic multivitamin-minerals (THERAGRAN-M) Tab Take 1 Tab by mouth every day. 30 Tab 11     No current facility-administered medications for this visit.   [4] No Known Allergies